# Patient Record
Sex: FEMALE | Race: WHITE | NOT HISPANIC OR LATINO | Employment: OTHER | URBAN - METROPOLITAN AREA
[De-identification: names, ages, dates, MRNs, and addresses within clinical notes are randomized per-mention and may not be internally consistent; named-entity substitution may affect disease eponyms.]

---

## 2023-10-09 ENCOUNTER — OFFICE VISIT (OUTPATIENT)
Dept: CARDIOLOGY | Facility: CLINIC | Age: 78
End: 2023-10-09
Payer: MEDICARE

## 2023-10-09 VITALS
SYSTOLIC BLOOD PRESSURE: 150 MMHG | HEIGHT: 62 IN | HEART RATE: 76 BPM | BODY MASS INDEX: 25.03 KG/M2 | WEIGHT: 136 LBS | DIASTOLIC BLOOD PRESSURE: 80 MMHG

## 2023-10-09 DIAGNOSIS — Z79.01 CHRONIC ANTICOAGULATION: ICD-10-CM

## 2023-10-09 DIAGNOSIS — I48.0 PAROXYSMAL ATRIAL FIBRILLATION: ICD-10-CM

## 2023-10-09 DIAGNOSIS — I25.118 CORONARY ARTERY DISEASE OF NATIVE ARTERY OF NATIVE HEART WITH STABLE ANGINA PECTORIS: ICD-10-CM

## 2023-10-09 DIAGNOSIS — R94.31 ABNORMAL EKG: Primary | ICD-10-CM

## 2023-10-09 RX ORDER — GLIPIZIDE 10 MG/1
TABLET, FILM COATED, EXTENDED RELEASE ORAL
COMMUNITY
Start: 2023-09-19

## 2023-10-09 RX ORDER — LOSARTAN POTASSIUM 100 MG/1
TABLET ORAL
COMMUNITY
Start: 2023-09-19

## 2023-10-09 RX ORDER — ALENDRONATE SODIUM 70 MG/1
TABLET ORAL
COMMUNITY
Start: 2023-08-11

## 2023-10-09 RX ORDER — DILTIAZEM HYDROCHLORIDE 120 MG/1
CAPSULE, COATED, EXTENDED RELEASE ORAL
COMMUNITY
Start: 2023-09-19

## 2023-10-09 RX ORDER — TRAMADOL HYDROCHLORIDE 50 MG/1
TABLET ORAL
COMMUNITY
Start: 2023-09-29

## 2023-10-09 RX ORDER — ATORVASTATIN CALCIUM 40 MG/1
TABLET, FILM COATED ORAL
COMMUNITY
Start: 2023-09-19

## 2023-10-09 RX ORDER — OMEPRAZOLE 20 MG/1
CAPSULE, DELAYED RELEASE ORAL
COMMUNITY
Start: 2023-09-19

## 2023-10-09 RX ORDER — PIOGLITAZONEHYDROCHLORIDE 30 MG/1
TABLET ORAL
COMMUNITY
Start: 2023-08-06

## 2023-10-09 NOTE — PROGRESS NOTES
NP SELF REFER CAD   Subjective:        [facility]  Cardiology Consult Note    Patient Identification:  Name: Michelle Atkinson  Age: 78 y.o.  Sex: female  :  1945  MRN: [id number]             CC  TWO STENTS, ,   DM  LUNG CA    AFIB  ANTICOAGULATED      History of Present Illness:     78-year-old female with known history of coronary artery disease with a previous stents, also had a lung cancer surgery was put on anticoagulation after the lung surgery for the atrial fibrillation wants to be off of the anticoagulation  Comorbid cardiac risk factors:     Past Medical History:  Past Medical History:   Diagnosis Date    CAD (coronary artery disease)     Essential (primary) hypertension     Type 2 diabetes mellitus      Past Surgical History:  Past Surgical History:   Procedure Laterality Date    CATARACT EXTRACTION Bilateral     CORONARY STENT PLACEMENT      x2    HYSTEROTOMY      PARTIAL      Allergies:  Allergies   Allergen Reactions    Codeine Nausea And Vomiting     Home Meds:  (Not in a hospital admission)    Current Meds:   No current facility-administered medications for this visit.  No current outpatient medications on file.    Facility-Administered Medications Ordered in Other Visits:     sodium chloride 0.9 % flush 10 mL, 10 mL, Intravenous, Q12H, Kath Smith MD    sodium chloride 0.9 % flush 10 mL, 10 mL, Intravenous, PRN, Kath Smith MD    sodium chloride 0.9 % infusion 40 mL, 40 mL, Intravenous, PRN, Kath Smith MD    sodium chloride 0.9 % infusion, 75 mL/hr, Intravenous, Continuous, Kath Smith MD, Last Rate: 75 mL/hr at 10/18/23 0914, 75 mL/hr at 10/18/23 09  Social History:   Social History     Tobacco Use    Smoking status: Former     Types: Cigarettes    Smokeless tobacco: Never   Substance Use Topics    Alcohol use: Never      Family History:  Family History   Problem Relation Age of Onset    Heart disease Mother     Heart  disease Father     Heart disease Brother         Review of Systems    Constitutional: No weakness,fatigue, fever, rigors, chills   Eyes: No vision changes, eye pain   ENT/oropharynx: No difficulty swallowing, sore throat, epistaxis, changes in hearing   Cardiovascular: No chest pain, chest tightness, palpitations, paroxysmal nocturnal dyspnea, orthopnea, diaphoresis, dizziness / syncopal episode   Respiratory: No shortness of breath, dyspnea on exertion, cough, wheezing hemoptysis   Gastrointestinal: No abdominal pain, nausea, vomiting, diarrhea, bloody stools   Genitourinary: No hematuria, dysuria   Neurological: No headache, tremors, numbness,  one-sided weakness    Musculoskeletal: No cramps, myalgias,  joint pain, joint swelling   Integument: No rash, edema           Constitutional:  Temp:  [98 °F (36.7 °C)] 98 °F (36.7 °C)  Heart Rate:  [73] 73  Resp:  [16] 16  BP: (158)/(63) 158/63    Physical Exam   General:  Appears in no acute distress  Eyes: PERTL,  HEENT:  No JVD. Thyroid not visibly enlarged. No mucosal pallor or cyanosis  Respiratory: Respirations regular and unlabored at rest. BBS with good air entry in all fields. No crackles, rubs or wheezes auscultated  Cardiovascular: S1S2 Regular rate and rhythm. No murmur, rub or gallop auscultated. No carotid bruits. DP/PT pulses    . No pretibial pitting edema  Gastrointestinal: Abdomen soft, flat, non tender. Bowel sounds present. No hepatosplenomegaly. No ascites  Musculoskeletal: CASTRO x4. No abnormal movements  Extremities: No digital clubbing or cyanosis  Skin: Color pink. Skin warm and dry to touch. No rashes  No xanthoma  Neuro: AAO x3 CN II-XII grossly intact            ECG 12 Lead    Date/Time: 10/9/2023 1:42 PM  Performed by: Kath Smith MD    Authorized by: Kath Smith MD  Comparison: not compared with previous ECG   Previous ECG: no previous ECG available  Rhythm: sinus rhythm  ST Flattening: all    Clinical impression:  non-specific ECG              Cardiographics  ECG:     Telemetry:    Echocardiogram:     Imaging  Chest X-ray:     Lab Review               [email or web address]              Assessment:/ Recommendations / Plan:   Patient Active Problem List   Diagnosis    Abnormal EKG    Coronary artery disease of native artery of native heart with stable angina pectoris    Chronic anticoagulation    Paroxysmal atrial fibrillation                    ICD-10-CM ICD-9-CM   1. Abnormal EKG  R94.31 794.31   2. Coronary artery disease of native artery of native heart with stable angina pectoris  I25.118 414.01     413.9   3. Chronic anticoagulation  Z79.01 V58.61   4. Paroxysmal atrial fibrillation  I48.0 427.31     1. Abnormal EKG  Considering the patient's symptoms as well as clinical situation and  EKG findings, along with cardiac risk factors, ischemic workup is necessary to rule out ischemic cardiomyopathy, stress induced arrhythmias, and functional capacity for diagnosis as well as prognostic consideration    - Stress Test With Myocardial Perfusion One Day  - Adult Transthoracic Echo Complete W/ Cont if Necessary Per Protocol  - Case Request Cath Lab: Loop insertion    2. Coronary artery disease of native artery of native heart with stable angina pectoris  Considering patient's medical condition as well as the risk factors, patient will require echocardiogram for further evaluation for the LV function, four-chamber evaluation, including the pressures, valvular function and  pericardial disease and pericardial effusion    - Stress Test With Myocardial Perfusion One Day  - Adult Transthoracic Echo Complete W/ Cont if Necessary Per Protocol  - Case Request Cath Lab: Loop insertion    3. Chronic anticoagulation  Continue current medication    4. Paroxysmal atrial fibrillation  No evidence of atrial fibrillation at this stage      WHITNEY, ECHO  LOOP AS PT WANTS TO COME OFF THE ELLOQUISE    AFIB WAS ONLY DURING LUNG SURG    Labs/tests ordered for  sb Smith MD  10/18/2023, 09:16 EDT      EMR Dragon/Transcription:   Dictated utilizing Dragon dictation

## 2023-10-09 NOTE — H&P (VIEW-ONLY)
NP SELF REFER CAD   Subjective:        [facility]  Cardiology Consult Note    Patient Identification:  Name: Michelle Atkinson  Age: 78 y.o.  Sex: female  :  1945  MRN: [id number]             CC  TWO STENTS, ,   DM  LUNG CA    AFIB  ANTICOAGULATED      History of Present Illness:     78-year-old female with known history of coronary artery disease with a previous stents, also had a lung cancer surgery was put on anticoagulation after the lung surgery for the atrial fibrillation wants to be off of the anticoagulation  Comorbid cardiac risk factors:     Past Medical History:  Past Medical History:   Diagnosis Date    CAD (coronary artery disease)     Essential (primary) hypertension     Type 2 diabetes mellitus      Past Surgical History:  Past Surgical History:   Procedure Laterality Date    CATARACT EXTRACTION Bilateral     CORONARY STENT PLACEMENT      x2    HYSTEROTOMY      PARTIAL      Allergies:  Allergies   Allergen Reactions    Codeine Nausea And Vomiting     Home Meds:  (Not in a hospital admission)    Current Meds:   No current facility-administered medications for this visit.  No current outpatient medications on file.    Facility-Administered Medications Ordered in Other Visits:     sodium chloride 0.9 % flush 10 mL, 10 mL, Intravenous, Q12H, Kath Smith MD    sodium chloride 0.9 % flush 10 mL, 10 mL, Intravenous, PRN, Kath Smith MD    sodium chloride 0.9 % infusion 40 mL, 40 mL, Intravenous, PRN, Kath Smith MD    sodium chloride 0.9 % infusion, 75 mL/hr, Intravenous, Continuous, Kath Smith MD, Last Rate: 75 mL/hr at 10/18/23 0914, 75 mL/hr at 10/18/23 09  Social History:   Social History     Tobacco Use    Smoking status: Former     Types: Cigarettes    Smokeless tobacco: Never   Substance Use Topics    Alcohol use: Never      Family History:  Family History   Problem Relation Age of Onset    Heart disease Mother     Heart  disease Father     Heart disease Brother         Review of Systems    Constitutional: No weakness,fatigue, fever, rigors, chills   Eyes: No vision changes, eye pain   ENT/oropharynx: No difficulty swallowing, sore throat, epistaxis, changes in hearing   Cardiovascular: No chest pain, chest tightness, palpitations, paroxysmal nocturnal dyspnea, orthopnea, diaphoresis, dizziness / syncopal episode   Respiratory: No shortness of breath, dyspnea on exertion, cough, wheezing hemoptysis   Gastrointestinal: No abdominal pain, nausea, vomiting, diarrhea, bloody stools   Genitourinary: No hematuria, dysuria   Neurological: No headache, tremors, numbness,  one-sided weakness    Musculoskeletal: No cramps, myalgias,  joint pain, joint swelling   Integument: No rash, edema           Constitutional:  Temp:  [98 °F (36.7 °C)] 98 °F (36.7 °C)  Heart Rate:  [73] 73  Resp:  [16] 16  BP: (158)/(63) 158/63    Physical Exam   General:  Appears in no acute distress  Eyes: PERTL,  HEENT:  No JVD. Thyroid not visibly enlarged. No mucosal pallor or cyanosis  Respiratory: Respirations regular and unlabored at rest. BBS with good air entry in all fields. No crackles, rubs or wheezes auscultated  Cardiovascular: S1S2 Regular rate and rhythm. No murmur, rub or gallop auscultated. No carotid bruits. DP/PT pulses    . No pretibial pitting edema  Gastrointestinal: Abdomen soft, flat, non tender. Bowel sounds present. No hepatosplenomegaly. No ascites  Musculoskeletal: CASTRO x4. No abnormal movements  Extremities: No digital clubbing or cyanosis  Skin: Color pink. Skin warm and dry to touch. No rashes  No xanthoma  Neuro: AAO x3 CN II-XII grossly intact            ECG 12 Lead    Date/Time: 10/9/2023 1:42 PM  Performed by: Kath Smith MD    Authorized by: Kath Smith MD  Comparison: not compared with previous ECG   Previous ECG: no previous ECG available  Rhythm: sinus rhythm  ST Flattening: all    Clinical impression:  non-specific ECG              Cardiographics  ECG:     Telemetry:    Echocardiogram:     Imaging  Chest X-ray:     Lab Review               [email or web address]              Assessment:/ Recommendations / Plan:   Patient Active Problem List   Diagnosis    Abnormal EKG    Coronary artery disease of native artery of native heart with stable angina pectoris    Chronic anticoagulation    Paroxysmal atrial fibrillation                    ICD-10-CM ICD-9-CM   1. Abnormal EKG  R94.31 794.31   2. Coronary artery disease of native artery of native heart with stable angina pectoris  I25.118 414.01     413.9   3. Chronic anticoagulation  Z79.01 V58.61   4. Paroxysmal atrial fibrillation  I48.0 427.31     1. Abnormal EKG  Considering the patient's symptoms as well as clinical situation and  EKG findings, along with cardiac risk factors, ischemic workup is necessary to rule out ischemic cardiomyopathy, stress induced arrhythmias, and functional capacity for diagnosis as well as prognostic consideration    - Stress Test With Myocardial Perfusion One Day  - Adult Transthoracic Echo Complete W/ Cont if Necessary Per Protocol  - Case Request Cath Lab: Loop insertion    2. Coronary artery disease of native artery of native heart with stable angina pectoris  Considering patient's medical condition as well as the risk factors, patient will require echocardiogram for further evaluation for the LV function, four-chamber evaluation, including the pressures, valvular function and  pericardial disease and pericardial effusion    - Stress Test With Myocardial Perfusion One Day  - Adult Transthoracic Echo Complete W/ Cont if Necessary Per Protocol  - Case Request Cath Lab: Loop insertion    3. Chronic anticoagulation  Continue current medication    4. Paroxysmal atrial fibrillation  No evidence of atrial fibrillation at this stage      WHITNEY, ECHO  LOOP AS PT WANTS TO COME OFF THE ELLOQUISE    AFIB WAS ONLY DURING LUNG SURG    Labs/tests ordered for  sb Smith MD  10/18/2023, 09:16 EDT      EMR Dragon/Transcription:   Dictated utilizing Dragon dictation

## 2023-10-10 ENCOUNTER — PATIENT ROUNDING (BHMG ONLY) (OUTPATIENT)
Dept: CARDIOLOGY | Facility: CLINIC | Age: 78
End: 2023-10-10
Payer: MEDICARE

## 2023-10-10 NOTE — PROGRESS NOTES
October 10, 2023    Tell me about your visit with us. What things went well?         We're always looking for ways to make our patients' experiences even better. Do you have recommendations on ways we may improve?      Overall were you satisfied with your first visit to our practice?        I appreciate you taking the time to speak with me today. Is there anything else I can do for you?       Thank you, and have a great day.

## 2023-10-18 ENCOUNTER — HOSPITAL ENCOUNTER (OUTPATIENT)
Facility: HOSPITAL | Age: 78
Setting detail: HOSPITAL OUTPATIENT SURGERY
Discharge: HOME | End: 2023-10-18
Attending: INTERNAL MEDICINE | Admitting: INTERNAL MEDICINE
Payer: MEDICARE

## 2023-10-18 VITALS
HEIGHT: 62 IN | HEART RATE: 78 BPM | TEMPERATURE: 98 F | DIASTOLIC BLOOD PRESSURE: 67 MMHG | WEIGHT: 136 LBS | BODY MASS INDEX: 25.03 KG/M2 | RESPIRATION RATE: 16 BRPM | SYSTOLIC BLOOD PRESSURE: 150 MMHG | OXYGEN SATURATION: 99 %

## 2023-10-18 DIAGNOSIS — R94.31 ABNORMAL EKG: ICD-10-CM

## 2023-10-18 DIAGNOSIS — I25.118 CORONARY ARTERY DISEASE OF NATIVE ARTERY OF NATIVE HEART WITH STABLE ANGINA PECTORIS: ICD-10-CM

## 2023-10-18 PROBLEM — I48.0 PAROXYSMAL ATRIAL FIBRILLATION: Status: ACTIVE | Noted: 2023-10-18

## 2023-10-18 PROBLEM — Z79.01 CHRONIC ANTICOAGULATION: Status: ACTIVE | Noted: 2023-10-18

## 2023-10-18 LAB — GLUCOSE BLDC GLUCOMTR-MCNC: 138 MG/DL (ref 70–130)

## 2023-10-18 PROCEDURE — 82948 REAGENT STRIP/BLOOD GLUCOSE: CPT

## 2023-10-18 PROCEDURE — 33285 INSJ SUBQ CAR RHYTHM MNTR: CPT | Performed by: INTERNAL MEDICINE

## 2023-10-18 PROCEDURE — C1764 EVENT RECORDER, CARDIAC: HCPCS | Performed by: INTERNAL MEDICINE

## 2023-10-18 PROCEDURE — 25010000002 CEFAZOLIN IN DEXTROSE 2-4 GM/100ML-% SOLUTION: Performed by: INTERNAL MEDICINE

## 2023-10-18 PROCEDURE — 25010000002 MIDAZOLAM PER 1 MG: Performed by: INTERNAL MEDICINE

## 2023-10-18 PROCEDURE — 25010000002 FENTANYL CITRATE (PF) 50 MCG/ML SOLUTION: Performed by: INTERNAL MEDICINE

## 2023-10-18 PROCEDURE — 25810000003 SODIUM CHLORIDE 0.9 % SOLUTION: Performed by: INTERNAL MEDICINE

## 2023-10-18 DEVICE — LUX-DX™ INSERTABLE CARDIAC MONITOR
Type: IMPLANTABLE DEVICE | Site: CHEST | Status: FUNCTIONAL
Brand: LUX-DX™ INSERTABLE CARDIAC MONITOR

## 2023-10-18 RX ORDER — SODIUM CHLORIDE 0.9 % (FLUSH) 0.9 %
10 SYRINGE (ML) INJECTION EVERY 12 HOURS SCHEDULED
Status: DISCONTINUED | OUTPATIENT
Start: 2023-10-18 | End: 2023-10-18 | Stop reason: HOSPADM

## 2023-10-18 RX ORDER — SODIUM CHLORIDE 9 MG/ML
75 INJECTION, SOLUTION INTRAVENOUS CONTINUOUS
Status: DISCONTINUED | OUTPATIENT
Start: 2023-10-18 | End: 2023-10-18 | Stop reason: HOSPADM

## 2023-10-18 RX ORDER — SODIUM CHLORIDE 9 MG/ML
40 INJECTION, SOLUTION INTRAVENOUS AS NEEDED
Status: DISCONTINUED | OUTPATIENT
Start: 2023-10-18 | End: 2023-10-18 | Stop reason: HOSPADM

## 2023-10-18 RX ORDER — FENTANYL CITRATE 50 UG/ML
INJECTION, SOLUTION INTRAMUSCULAR; INTRAVENOUS
Status: DISCONTINUED | OUTPATIENT
Start: 2023-10-18 | End: 2023-10-18 | Stop reason: HOSPADM

## 2023-10-18 RX ORDER — MIDAZOLAM HYDROCHLORIDE 1 MG/ML
INJECTION INTRAMUSCULAR; INTRAVENOUS
Status: DISCONTINUED | OUTPATIENT
Start: 2023-10-18 | End: 2023-10-18 | Stop reason: HOSPADM

## 2023-10-18 RX ORDER — SODIUM CHLORIDE 0.9 % (FLUSH) 0.9 %
10 SYRINGE (ML) INJECTION AS NEEDED
Status: DISCONTINUED | OUTPATIENT
Start: 2023-10-18 | End: 2023-10-18 | Stop reason: HOSPADM

## 2023-10-18 RX ORDER — CEFAZOLIN SODIUM 2 G/100ML
INJECTION, SOLUTION INTRAVENOUS
Status: COMPLETED | OUTPATIENT
Start: 2023-10-18 | End: 2023-10-18

## 2023-10-18 RX ADMIN — SODIUM CHLORIDE 75 ML/HR: 9 INJECTION, SOLUTION INTRAVENOUS at 09:14

## 2023-10-18 NOTE — DISCHARGE INSTRUCTIONS
"Dr Kath Smith  [address] [address]  [phone]      Instructions      1. The dressing may be removed in 48 hours.    2. If steri-strips were used, they should not be removed. Allow them to \"fall off\".      3. You may shower after the dressing is removed. Do not allow shower water to hit directly on incision.    4. No lotion/powder/ointment/cream on incision until it is healed.    5. Gently let water run down incision and pat dry.    6.  Call to schedule a follow-up appointment in the office in two weeks.    7. Please call the office if you experience any of the following:   bleeding or drainage from your incision   swelling, redness, or opening of your incision   fever or chills   pain not relieved with medication   chest pain or difficulty breathing   lightheadness  "

## 2023-10-18 NOTE — PERIOPERATIVE NURSING NOTE
Pt and spouse spoke to Dr Smith post procedure and loop recorder rep, received box and loop device information

## 2023-11-02 ENCOUNTER — HOSPITAL ENCOUNTER (OUTPATIENT)
Dept: CARDIOLOGY | Facility: HOSPITAL | Age: 78
Discharge: HOME | End: 2023-11-02
Payer: MEDICARE

## 2023-11-02 ENCOUNTER — OFFICE VISIT (OUTPATIENT)
Dept: CARDIOLOGY | Facility: CLINIC | Age: 78
End: 2023-11-02
Payer: MEDICARE

## 2023-11-02 VITALS — HEIGHT: 62 IN | BODY MASS INDEX: 25.03 KG/M2 | WEIGHT: 136 LBS

## 2023-11-02 VITALS — SYSTOLIC BLOOD PRESSURE: 165 MMHG | DIASTOLIC BLOOD PRESSURE: 84 MMHG | HEART RATE: 67 BPM

## 2023-11-02 VITALS
WEIGHT: 140 LBS | HEIGHT: 62 IN | SYSTOLIC BLOOD PRESSURE: 152 MMHG | DIASTOLIC BLOOD PRESSURE: 78 MMHG | HEART RATE: 69 BPM | BODY MASS INDEX: 25.76 KG/M2

## 2023-11-02 DIAGNOSIS — I48.0 PAROXYSMAL ATRIAL FIBRILLATION: ICD-10-CM

## 2023-11-02 DIAGNOSIS — R94.31 ABNORMAL EKG: Primary | ICD-10-CM

## 2023-11-02 DIAGNOSIS — Z79.01 CHRONIC ANTICOAGULATION: ICD-10-CM

## 2023-11-02 DIAGNOSIS — I25.118 CORONARY ARTERY DISEASE OF NATIVE ARTERY OF NATIVE HEART WITH STABLE ANGINA PECTORIS: ICD-10-CM

## 2023-11-02 LAB
AORTIC DIMENSIONLESS INDEX: 0.7 (DI)
BH CV ECHO MEAS - ACS: 2 CM
BH CV ECHO MEAS - AO MAX PG: 6 MMHG
BH CV ECHO MEAS - AO MEAN PG: 3 MMHG
BH CV ECHO MEAS - AO ROOT DIAM: 3.2 CM
BH CV ECHO MEAS - AO V2 MAX: 122 CM/SEC
BH CV ECHO MEAS - AO V2 VTI: 28.2 CM
BH CV ECHO MEAS - AVA(I,D): 2.8 CM2
BH CV ECHO MEAS - CONTRAST EF 4CH: 49 CM2
BH CV ECHO MEAS - EDV(CUBED): 157.5 ML
BH CV ECHO MEAS - EDV(MOD-SP2): 172 ML
BH CV ECHO MEAS - EDV(MOD-SP4): 141 ML
BH CV ECHO MEAS - EF(MOD-BP): 49.8 %
BH CV ECHO MEAS - EF(MOD-SP2): 53.5 %
BH CV ECHO MEAS - EF(MOD-SP4): 46.8 %
BH CV ECHO MEAS - ESV(CUBED): 85.2 ML
BH CV ECHO MEAS - ESV(MOD-SP2): 80 ML
BH CV ECHO MEAS - ESV(MOD-SP4): 75 ML
BH CV ECHO MEAS - FS: 18.5 %
BH CV ECHO MEAS - IVS/LVPW: 1.13 CM
BH CV ECHO MEAS - IVSD: 0.9 CM
BH CV ECHO MEAS - LAT PEAK E' VEL: 4.6 CM/SEC
BH CV ECHO MEAS - LV MASS(C)D: 167.4 GRAMS
BH CV ECHO MEAS - LV MAX PG: 3.5 MMHG
BH CV ECHO MEAS - LV MEAN PG: 1 MMHG
BH CV ECHO MEAS - LV V1 MAX: 93.1 CM/SEC
BH CV ECHO MEAS - LV V1 VTI: 20.8 CM
BH CV ECHO MEAS - LVIDD: 5.4 CM
BH CV ECHO MEAS - LVIDS: 4.4 CM
BH CV ECHO MEAS - LVOT AREA: 3.8 CM2
BH CV ECHO MEAS - LVOT DIAM: 2.2 CM
BH CV ECHO MEAS - LVPWD: 0.8 CM
BH CV ECHO MEAS - MED PEAK E' VEL: 5.3 CM/SEC
BH CV ECHO MEAS - MV A DUR: 0.17 SEC
BH CV ECHO MEAS - MV A MAX VEL: 85.9 CM/SEC
BH CV ECHO MEAS - MV DEC SLOPE: 304 CM/SEC2
BH CV ECHO MEAS - MV DEC TIME: 195 SEC
BH CV ECHO MEAS - MV E MAX VEL: 81 CM/SEC
BH CV ECHO MEAS - MV E/A: 0.94
BH CV ECHO MEAS - MV MAX PG: 3.3 MMHG
BH CV ECHO MEAS - MV MEAN PG: 2 MMHG
BH CV ECHO MEAS - MV P1/2T: 89.7 MSEC
BH CV ECHO MEAS - MV V2 VTI: 25.7 CM
BH CV ECHO MEAS - MVA(P1/2T): 2.45 CM2
BH CV ECHO MEAS - MVA(VTI): 3.1 CM2
BH CV ECHO MEAS - PA ACC TIME: 0.13 SEC
BH CV ECHO MEAS - PA V2 MAX: 72.1 CM/SEC
BH CV ECHO MEAS - PULM A REVS DUR: 0.17 SEC
BH CV ECHO MEAS - PULM A REVS VEL: 35.6 CM/SEC
BH CV ECHO MEAS - PULM DIAS VEL: 57.3 CM/SEC
BH CV ECHO MEAS - PULM S/D: 1.18
BH CV ECHO MEAS - PULM SYS VEL: 67.7 CM/SEC
BH CV ECHO MEAS - QP/QS: 0.58
BH CV ECHO MEAS - RAP SYSTOLE: 3 MMHG
BH CV ECHO MEAS - RV MAX PG: 2.41 MMHG
BH CV ECHO MEAS - RV V1 MAX: 77.6 CM/SEC
BH CV ECHO MEAS - RV V1 VTI: 18.1 CM
BH CV ECHO MEAS - RVOT DIAM: 1.8 CM
BH CV ECHO MEAS - RVSP: 17.6 MMHG
BH CV ECHO MEAS - SV(LVOT): 79.1 ML
BH CV ECHO MEAS - SV(MOD-SP2): 92 ML
BH CV ECHO MEAS - SV(MOD-SP4): 66 ML
BH CV ECHO MEAS - SV(RVOT): 46.1 ML
BH CV ECHO MEAS - TAPSE (>1.6): 2.17 CM
BH CV ECHO MEAS - TR MAX PG: 14.6 MMHG
BH CV ECHO MEAS - TR MAX VEL: 191 CM/SEC
BH CV ECHO MEASUREMENTS AVERAGE E/E' RATIO: 16.36
BH CV REST NUCLEAR ISOTOPE DOSE: 10.9 MCI
BH CV STRESS BP STAGE 1: NORMAL
BH CV STRESS COMMENTS STAGE 1: NORMAL
BH CV STRESS DOSE REGADENOSON STAGE 1: 0.4
BH CV STRESS DURATION MIN STAGE 1: 0
BH CV STRESS DURATION SEC STAGE 1: 10
BH CV STRESS HR STAGE 1: 88
BH CV STRESS NUCLEAR ISOTOPE DOSE: 32.1 MCI
BH CV STRESS PROTOCOL 1: NORMAL
BH CV STRESS RECOVERY BP: NORMAL MMHG
BH CV STRESS RECOVERY HR: 96 BPM
BH CV STRESS STAGE 1: 1
BH CV VAS BP RIGHT ARM: NORMAL MMHG
BH CV XLRA - RV BASE: 3 CM
BH CV XLRA - RV LENGTH: 5.6 CM
BH CV XLRA - RV MID: 2.37 CM
BH CV XLRA - TDI S': 15.2 CM/SEC
LEFT ATRIUM VOLUME INDEX: 67.3 ML/M2
LV EF NUC BP: 60 %
MAXIMAL PREDICTED HEART RATE: 141 BPM
PERCENT MAX PREDICTED HR: 62.41 %
SINUS: 3.6 CM
STJ: 3.4 CM
STRESS BASELINE BP: NORMAL MMHG
STRESS BASELINE HR: 69 BPM
STRESS PERCENT HR: 73 %
STRESS POST ESTIMATED WORKLOAD: 1 METS
STRESS POST EXERCISE DUR MIN: 1 MIN
STRESS POST EXERCISE DUR SEC: 0 SEC
STRESS POST PEAK BP: NORMAL MMHG
STRESS POST PEAK HR: 88 BPM
STRESS TARGET HR: 120 BPM

## 2023-11-02 PROCEDURE — 25010000002 REGADENOSON 0.4 MG/5ML SOLUTION: Performed by: INTERNAL MEDICINE

## 2023-11-02 PROCEDURE — A9500 TC99M SESTAMIBI: HCPCS | Performed by: INTERNAL MEDICINE

## 2023-11-02 PROCEDURE — 25510000001 PERFLUTREN (DEFINITY) 8.476 MG IN SODIUM CHLORIDE (PF) 0.9 % 10 ML INJECTION: Performed by: INTERNAL MEDICINE

## 2023-11-02 PROCEDURE — 93306 TTE W/DOPPLER COMPLETE: CPT | Performed by: INTERNAL MEDICINE

## 2023-11-02 PROCEDURE — 93017 CV STRESS TEST TRACING ONLY: CPT

## 2023-11-02 PROCEDURE — 0 TECHNETIUM SESTAMIBI: Performed by: INTERNAL MEDICINE

## 2023-11-02 PROCEDURE — 93306 TTE W/DOPPLER COMPLETE: CPT

## 2023-11-02 PROCEDURE — 78452 HT MUSCLE IMAGE SPECT MULT: CPT

## 2023-11-02 RX ORDER — REGADENOSON 0.08 MG/ML
0.4 INJECTION, SOLUTION INTRAVENOUS
Status: COMPLETED | OUTPATIENT
Start: 2023-11-02 | End: 2023-11-02

## 2023-11-02 RX ADMIN — SODIUM CHLORIDE 1.5 ML: 9 INJECTION INTRAMUSCULAR; INTRAVENOUS; SUBCUTANEOUS at 10:53

## 2023-11-02 RX ADMIN — REGADENOSON 0.4 MG: 0.08 INJECTION, SOLUTION INTRAVENOUS at 10:00

## 2023-11-02 RX ADMIN — TECHNETIUM TC 99M SESTAMIBI 1 DOSE: 1 INJECTION INTRAVENOUS at 10:00

## 2023-11-02 RX ADMIN — TECHNETIUM TC 99M SESTAMIBI 1 DOSE: 1 INJECTION INTRAVENOUS at 08:04

## 2023-11-02 NOTE — PROGRESS NOTES
ECHO, STRESS TEST TODAY.    FOLLOW UP   Subjective:        Michelle Atkinson is a 78 y.o. female who here for follow up    CC  Follow-up coronary artery disease atrial fibrillation chronic anticoagulation  HPI  78-year-old female with coronary artery disease paroxysmal atrial fibrillation chronic anticoagulation underwent the stress test and echo     Problems Addressed this Visit          Cardiac and Vasculature    Abnormal EKG - Primary    Coronary artery disease of native artery of native heart with stable angina pectoris    Paroxysmal atrial fibrillation       Coag and Thromboembolic    Chronic anticoagulation     Diagnoses         Codes Comments    Abnormal EKG    -  Primary ICD-10-CM: R94.31  ICD-9-CM: 794.31     Coronary artery disease of native artery of native heart with stable angina pectoris     ICD-10-CM: I25.118  ICD-9-CM: 414.01, 413.9     Chronic anticoagulation     ICD-10-CM: Z79.01  ICD-9-CM: V58.61     Paroxysmal atrial fibrillation     ICD-10-CM: I48.0  ICD-9-CM: 427.31           .    The following portions of the patient's history were reviewed and updated as appropriate: allergies, current medications, past family history, past medical history, past social history, past surgical history and problem list.    Past Medical History:   Diagnosis Date    CAD (coronary artery disease)     Essential (primary) hypertension     Type 2 diabetes mellitus      reports that she has quit smoking. Her smoking use included cigarettes. She has never used smokeless tobacco. She reports that she does not drink alcohol and does not use drugs.   Family History   Problem Relation Age of Onset    Heart disease Mother     Heart disease Father     Heart disease Brother        Review of Systems  Constitutional: No wt loss, fever, fatigue  Gastrointestinal: No nausea, abdominal pain  Behavioral/Psych: No insomnia or anxiety   Cardiovascular no chest pains or tightness in the chest  Objective:       Physical Exam            "Physical Exam  /78   Pulse 69   Ht 157.5 cm (62\")   Wt 63.5 kg (140 lb)   BMI 25.61 kg/m²     General appearance: No acute changes   Eyes: Sclerae conjunctivae normal, pupils reactive   HENT: Atraumatic; oropharynx clear with moist mucous membranes and no mucosal ulcerations;  Neck: Trachea midline; NECK, supple, no thyromegaly or lymphadenopathy   Lungs: Normal size and shape, normal breath sounds, equal distribution of air, no rales and rhonchi   CV: S1-S2 regular, no murmurs, no rub, no gallop   Abdomen: Soft, nontender; no masses , no abnormal abdominal sounds   Extremities: No deformity , normal color , no peripheral edema   Skin: Normal temperature, turgor and texture; no rash, ulcers  Psych: Appropriate affect, alert and oriented to person, place and time           Procedures      Echocardiogram:        Current Outpatient Medications:     alendronate (FOSAMAX) 70 MG tablet, , Disp: , Rfl:     atorvastatin (LIPITOR) 40 MG tablet, , Disp: , Rfl:     dilTIAZem CD (CARDIZEM CD) 120 MG 24 hr capsule, , Disp: , Rfl:     glipizide (GLUCOTROL XL) 10 MG 24 hr tablet, , Disp: , Rfl:     losartan (COZAAR) 100 MG tablet, , Disp: , Rfl:     metFORMIN (GLUCOPHAGE) 1000 MG tablet, , Disp: , Rfl:     Mirabegron (MYRBETRIQ PO), Take  by mouth., Disp: , Rfl:     omeprazole (priLOSEC) 20 MG capsule, , Disp: , Rfl:     pioglitazone (ACTOS) 30 MG tablet, , Disp: , Rfl:     thiamine (VITAMIN B-1) 100 MG tablet  tablet, Take 1 tablet by mouth Daily., Disp: , Rfl:     traMADol (ULTRAM) 50 MG tablet, , Disp: , Rfl:    Assessment:        [unfilled]            Plan:            ICD-10-CM ICD-9-CM   1. Abnormal EKG  R94.31 794.31   2. Coronary artery disease of native artery of native heart with stable angina pectoris  I25.118 414.01     413.9   3. Chronic anticoagulation  Z79.01 V58.61   4. Paroxysmal atrial fibrillation  I48.0 427.31     1. Abnormal EKG  No angina pectoris    2. Coronary artery disease of native artery of " native heart with stable angina pectoris  No angina pectoris    3. Chronic anticoagulation  Continue current treatment    4. Paroxysmal atrial fibrillation  Under control      6 months    Specificity and sensitivity of the stress test/ cardiac workup has been explained. Pt has been explained if  Symptoms continue please go to ER, and further w/p will be required.    Also explained this does not rule out coronary artery disease or the future events, continue to emphasize on risk reductions for coronary artery disease    Pt also advised to contact PCP for other causes of symptoms    COUNSELING:    Michelle Coffey was given to patient for the following topics: diagnostic results, risk factor reductions, impressions, risks and benefits of treatment options and importance of treatment compliance .       SMOKING COUNSELING:        Dictated using Dragon dictation

## 2023-11-06 ENCOUNTER — TELEPHONE (OUTPATIENT)
Dept: CARDIOLOGY | Facility: CLINIC | Age: 78
End: 2023-11-06

## 2023-11-06 NOTE — TELEPHONE ENCOUNTER
Caller: Michelle Atkinson    Relationship: Self    Best call back number: [phone]    What is the best time to reach you: ANYTIME    Who are you requesting to speak with (clinical staff, provider,  specific staff member): CLINICAL        What was the call regarding: PT HAD A STROKE  ON 11/04/2023 AND IS IN [facility].  THEY WANT TO PUT HER BACK ON ELIQUIS, WHAT IS YOUR OPINION? PLEASE ADVISE PT    Is it okay if the provider responds through MyChart: NO

## 2023-11-07 ENCOUNTER — TELEPHONE (OUTPATIENT)
Dept: CARDIOLOGY | Facility: CLINIC | Age: 78
End: 2023-11-07
Payer: MEDICARE

## 2023-11-07 NOTE — TELEPHONE ENCOUNTER
PATIENT STATES THAT DR WALKER TOOK HER OFF ELIQUIST RECENTLY.  PATIENT ALSO STATES THAT SHE HAD A STROKE OVER THE WEEKEND AND WAS SEEN AT A HOSPITAL IN [place].  PATIENT SAYS THAT THE DR WHO SAW HER IN [place] WANTS TO PUT HER ON ELIQUIS.  PLEASE CALL PATIENT TO ADVISE.  THANKS   [phone]

## 2023-11-10 ENCOUNTER — TELEPHONE (OUTPATIENT)
Dept: CARDIOLOGY | Facility: CLINIC | Age: 78
End: 2023-11-10

## 2023-11-10 NOTE — TELEPHONE ENCOUNTER
Caller Name: Michelle Atkinson      Relationship: Self      Best Contact Number: [phone]      Patient is requesting samples of ELIQUIS 5MG TWICE A DAY      How many days of medication do you have left? PATIENT ONLY HAS ABOUT 10 DAYS OF MEDS LEFT FOR THE YEAR      Additional Information: SHE GETS IT THROUGH [employer] AND HAS ENOUGH LEFT FOR ABOUT 10 DAYS

## 2023-11-28 ENCOUNTER — HOSPITAL ENCOUNTER (OUTPATIENT)
Dept: CARDIOLOGY | Facility: HOSPITAL | Age: 78
Discharge: HOME | End: 2023-11-28
Payer: MEDICARE

## 2023-11-28 ENCOUNTER — OFFICE VISIT (OUTPATIENT)
Dept: CARDIOLOGY | Facility: CLINIC | Age: 78
End: 2023-11-28
Payer: MEDICARE

## 2023-11-28 VITALS
SYSTOLIC BLOOD PRESSURE: 141 MMHG | BODY MASS INDEX: 25.21 KG/M2 | WEIGHT: 137 LBS | DIASTOLIC BLOOD PRESSURE: 75 MMHG | HEIGHT: 62 IN | HEART RATE: 77 BPM

## 2023-11-28 DIAGNOSIS — R60.0 BILATERAL LEG EDEMA: ICD-10-CM

## 2023-11-28 DIAGNOSIS — E78.5 HYPERLIPIDEMIA, UNSPECIFIED HYPERLIPIDEMIA TYPE: ICD-10-CM

## 2023-11-28 DIAGNOSIS — I25.118 CORONARY ARTERY DISEASE OF NATIVE ARTERY OF NATIVE HEART WITH STABLE ANGINA PECTORIS: Primary | ICD-10-CM

## 2023-11-28 DIAGNOSIS — I10 PRIMARY HYPERTENSION: ICD-10-CM

## 2023-11-28 DIAGNOSIS — I48.0 PAROXYSMAL ATRIAL FIBRILLATION: ICD-10-CM

## 2023-11-28 DIAGNOSIS — I25.118 CORONARY ARTERY DISEASE OF NATIVE ARTERY OF NATIVE HEART WITH STABLE ANGINA PECTORIS: ICD-10-CM

## 2023-11-28 DIAGNOSIS — Z95.818 STATUS POST PLACEMENT OF IMPLANTABLE LOOP RECORDER: ICD-10-CM

## 2023-11-28 PROBLEM — I63.9 CVA (CEREBROVASCULAR ACCIDENT): Status: ACTIVE | Noted: 2023-11-28

## 2023-11-28 LAB
ANION GAP SERPL CALCULATED.3IONS-SCNC: 8.7 MMOL/L (ref 5–15)
BUN SERPL-MCNC: 32 MG/DL (ref 8–23)
BUN/CREAT SERPL: 26.2 (ref 7–25)
CALCIUM SPEC-SCNC: 9.9 MG/DL (ref 8.6–10.5)
CHLORIDE SERPL-SCNC: 110 MMOL/L (ref 98–107)
CO2 SERPL-SCNC: 22.3 MMOL/L (ref 22–29)
CREAT SERPL-MCNC: 1.22 MG/DL (ref 0.57–1)
EGFRCR SERPLBLD CKD-EPI 2021: 45.2 ML/MIN/1.73
GLUCOSE SERPL-MCNC: 180 MG/DL (ref 65–99)
POTASSIUM SERPL-SCNC: 5.3 MMOL/L (ref 3.5–5.2)
SODIUM SERPL-SCNC: 141 MMOL/L (ref 136–145)

## 2023-11-28 PROCEDURE — 36415 COLL VENOUS BLD VENIPUNCTURE: CPT

## 2023-11-28 PROCEDURE — 80048 BASIC METABOLIC PNL TOTAL CA: CPT

## 2023-11-28 RX ORDER — HYDROCHLOROTHIAZIDE 25 MG/1
12.5 TABLET ORAL
COMMUNITY
Start: 2023-11-06 | End: 2023-11-28 | Stop reason: ALTCHOICE

## 2023-11-28 RX ORDER — FUROSEMIDE 20 MG/1
20 TABLET ORAL DAILY
Qty: 30 TABLET | Refills: 11 | Status: SHIPPED | OUTPATIENT
Start: 2023-11-28

## 2023-11-28 RX ORDER — BISOPROLOL FUMARATE AND HYDROCHLOROTHIAZIDE 5; 6.25 MG/1; MG/1
1 TABLET ORAL DAILY
Qty: 30 TABLET | Refills: 11 | Status: SHIPPED | OUTPATIENT
Start: 2023-11-28

## 2023-11-28 NOTE — PROGRESS NOTES
Subjective:        Michelle Atkinson is a 78 y.o. female who here for follow up    Chief Complaint   Patient presents with    Hospital Follow Up Visit     stroke       HPI    This is a 78-year-old female who is new with this provider with paroxysmal atrial fibrillation, coronary artery disease with history of stent placed in 2002 and 2007, hypertension, diabetes mellitus.  She follows up in office today after hospitalization in [place] in which she was diagnosed with CVA.    Looks like she was seen 10/9/2023 to establish care requesting to come off anticoagulation.  Reported she had 2 episodes of A-fib during chemotherapy for lung cancer.  She had loop placed 10/19/2023 and Eliquis was stopped at that time.    Echo 11/2/2023 EF 46 to 50%, normal LV function.  LAC mildly dilated, L a volume mildly increased.  RA C mildly dilated.  Stress test 11/2/2023 myocardial perfusion imaging indicates a normal study with no evidence of ischemia.    The following portions of the patient's history were reviewed and updated as appropriate: allergies, current medications, past family history, past medical history, past social history, past surgical history and problem list.    Past Medical History:   Diagnosis Date    CAD (coronary artery disease)     Essential (primary) hypertension     Stroke     Type 2 diabetes mellitus          reports that she has quit smoking. Her smoking use included cigarettes. She has never used smokeless tobacco. She reports that she does not drink alcohol and does not use drugs.     Family History   Problem Relation Age of Onset    Heart disease Mother     Heart disease Father     Heart disease Brother        ROS     Review of Systems  Constitutional: No wt loss, fever, fatigue  Gastrointestinal: No nausea, abdominal pain  Behavioral/Psych: No insomnia or anxiety  Cardiovascular no chest pain or shortness of breath      Objective:           Vitals and nursing note reviewed.   Constitutional:        Appearance: Well-developed.   HENT:      Head: Normocephalic.      Right Ear: External ear normal.      Left Ear: External ear normal.   Neck:      Vascular: No JVD.   Pulmonary:      Effort: Pulmonary effort is normal. No respiratory distress.      Breath sounds: Normal breath sounds. No stridor. No rales.   Cardiovascular:      Normal rate. Regular rhythm.      No gallop.    Pulses:     Intact distal pulses.   Edema:     Peripheral edema absent.   Abdominal:      General: Bowel sounds are normal. There is no distension.      Palpations: Abdomen is soft.      Tenderness: There is no abdominal tenderness. There is no guarding.   Musculoskeletal: Normal range of motion.         General: No tenderness.      Cervical back: Normal range of motion. Skin:     General: Skin is warm.   Neurological:      Mental Status: Alert and oriented to person, place, and time.      Deep Tendon Reflexes: Reflexes are normal and symmetric.   Psychiatric:         Judgment: Judgment normal.         Procedures    Interpretation Summary         Left ventricular ejection fraction is normal (Calculated EF = 60%).    Myocardial perfusion imaging indicates a normal myocardial perfusion study with no evidence of ischemia.    Impressions are consistent with a low risk study.    Findings consistent with a normal ECG stress test.    Interpretation Summary         Left ventricular ejection fraction appears to be 46 - 50%.    Left ventricular diastolic function was normal.    The left atrial cavity is mildly dilated.    Left atrial volume is mildly increased.    The right atrial cavity is mildly  dilated.    Estimated right ventricular systolic pressure from tricuspid regurgitation is normal (<35 mmHg).      Current Outpatient Medications:     alendronate (FOSAMAX) 70 MG tablet, , Disp: , Rfl:     apixaban (ELIQUIS) 5 MG tablet tablet, Take 1 tablet by mouth Every 12 (Twelve) Hours., Disp: 60 tablet, Rfl: 11    atorvastatin (LIPITOR) 40 MG tablet, ,  Disp: , Rfl:     glipizide (GLUCOTROL XL) 10 MG 24 hr tablet, , Disp: , Rfl:     losartan (COZAAR) 100 MG tablet, , Disp: , Rfl:     metFORMIN (GLUCOPHAGE) 1000 MG tablet, , Disp: , Rfl:     Mirabegron (MYRBETRIQ PO), Take  by mouth., Disp: , Rfl:     omeprazole (priLOSEC) 20 MG capsule, , Disp: , Rfl:     pioglitazone (ACTOS) 30 MG tablet, , Disp: , Rfl:     thiamine (VITAMIN B-1) 100 MG tablet  tablet, Take 1 tablet by mouth Daily., Disp: , Rfl:     traMADol (ULTRAM) 50 MG tablet, , Disp: , Rfl:     bisoprolol-hydrochlorothiazide (ZIAC) 5-6.25 MG per tablet, Take 1 tablet by mouth Daily., Disp: 30 tablet, Rfl: 11    furosemide (LASIX) 20 MG tablet, Take 1 tablet by mouth Daily., Disp: 30 tablet, Rfl: 11     Assessment:        Patient Active Problem List   Diagnosis    Abnormal EKG    Coronary artery disease of native artery of native heart with stable angina pectoris    Chronic anticoagulation    Paroxysmal atrial fibrillation    CVA (cerebrovascular accident)     CHADS-VASc Risk Assessment              5 Total Score    1 Hypertension    2 Age >/= 75    1 DM    1 Sex: Female        Criteria that do not apply:    CHF    PRIOR STROKE/TIA/THROMBO    Vascular Disease    Age 65-74                    Plan:   1.  Paroxysmal atrial fibrillation: Loop interrogation from 11/20/2023 reveals no episodes or events.  Recent CVA. Now on AC with eliquis, refilled. She does have some leg swelling, I will stop diltiazem and start bisoprolol 5/6.25    Pros and cons as well as indication of the anticoagulation has been explained to the patient in detail. There are no obvious complications at this stage. Risk of  the bleedings has been explained. Need for the regular blood workup and adjust the dose has been explained. Need for proper follow-up on anticoagulation also has been explained.     2.  Loop recorder: Monitored, device interrogation 11/20/2023 without episodes or events.    3.  Coronary artery disease: Ischemic workup as  above.  No chest pain.     Risk reduction for the coronary artery disease, controlling the blood pressure, blood sugar management, cholesterol management, exercise, stress management, and proper compliance with medications and follow-up has been discussed    4.  Hypertension: BP today in office 141/75,  Controlled.  Continue losartan . Adjustments made stop diltiazem, start ziac 5/6.25    Importance of controlling hypertension and blood pressure  checkup on the regular basis has been explained. Hypertension as a silent killer has been discussed. Risk reduction of the weight and regular exercises to control the hypertension has been explained.    5.  Hyperlipidemia: She reports her PCP manages her cholesterol labs.  Continue atorvastatin.    Risk of the hyperlipidemia, importance of the treatment has been explained. Pros and cons of the statins has been explained. Regular blood workup as well as side effects including the liver failure, myelopathy death has been explained.    6. LE edema: BLE pitting edema, stop HCTZ, start furosemide 20. BMP today.     Pros and cons of this new medication / change medication has been explained to  the patient. Possible side effects has been explained. Associated need of the blood  Work has been explained. Need for the compliance of the medication has been explained.                 No diagnosis found.    There are no diagnoses linked to this encounter.    COUNSELING: Roosevelt Coffey was given to patient for the following topics: diagnostic results, risk factor reductions, impressions, risks and benefits of treatment options and importance of treatment compliance .       SMOKING COUNSELING: denies  Stop diltiazem, start ziac 5/6.25  Stop HCTZ, start furosemide 20mg  BMP today  Follow up in 1 month or sooner if needed  Call in 2 days if swelling not improved or new or worsening symptoms    Sincerely,   STARR Mott  [facility]  [facility]  11/28/23  13:05 EST    EMR Dragon/Transcription disclaimer:   Much of this encounter note is an electronic transcription/translation of spoken language to printed text. The electronic translation of spoken language may permit erroneous, or at times, nonsensical words or phrases to be inadvertently transcribed; Although I have reviewed the note for such errors, some may still exist.

## 2024-01-12 RX ORDER — DAPAGLIFLOZIN 10 MG/1
TABLET, FILM COATED ORAL
COMMUNITY
Start: 2024-01-08

## 2024-01-12 RX ORDER — AMLODIPINE BESYLATE 5 MG/1
1 TABLET ORAL DAILY
COMMUNITY
Start: 2024-01-01

## 2024-01-22 ENCOUNTER — TELEPHONE (OUTPATIENT)
Dept: CARDIOLOGY | Facility: CLINIC | Age: 79
End: 2024-01-22

## 2024-01-22 NOTE — TELEPHONE ENCOUNTER
Caller: Michelle Atkinson    Relationship to patient: Self    Best call back number: [phone]    Chief complaint: 3 OR 4 TIMES SHE'S HAD SPELLS WHERE IT IS HARD TO BREATH,  PRESSURE IN CHEST,  SICK AT STOMACH,  AND HEART RACING. GETS REALLY WEAK WHEN IT HAPPENS AND BREAKS OUT IN A SWEAT.      Type of visit: FOLLOW UP    Requested date: ASAP     If rescheduling, when is the original appointment: HAS AN APPT 5-20-24     Additional notes: SHE HAS A LOOP DEVICE.   PATIENT HAD A STROKE ON 11-4-23.  ABOUT 2 WEEKS AGO SHE WAS IN THE HOSPITAL FOR SEVERE DEHYDRATION.   WENT TO THE DOCTOR THIS MORNING AND HER KIDNEY NUMBERS ARE NOT GOOD.       SHE ALSO NEEDS NEW FORMS FOR [employer].  THE COMPANY HAS NEWER FORMS THAT HAVE TO BE FILLED OUT BY THE OFFICE.

## 2024-01-23 ENCOUNTER — HOSPITAL ENCOUNTER (OUTPATIENT)
Dept: CARDIOLOGY | Facility: HOSPITAL | Age: 79
Discharge: HOME | End: 2024-01-23
Payer: MEDICARE

## 2024-01-23 ENCOUNTER — OFFICE VISIT (OUTPATIENT)
Dept: CARDIOLOGY | Facility: CLINIC | Age: 79
End: 2024-01-23
Payer: MEDICARE

## 2024-01-23 VITALS
HEIGHT: 62 IN | SYSTOLIC BLOOD PRESSURE: 114 MMHG | BODY MASS INDEX: 22.63 KG/M2 | HEART RATE: 61 BPM | DIASTOLIC BLOOD PRESSURE: 69 MMHG | WEIGHT: 123 LBS

## 2024-01-23 DIAGNOSIS — E78.5 HYPERLIPIDEMIA, UNSPECIFIED HYPERLIPIDEMIA TYPE: ICD-10-CM

## 2024-01-23 DIAGNOSIS — R07.2 PRECORDIAL PAIN: Primary | ICD-10-CM

## 2024-01-23 DIAGNOSIS — I25.118 CORONARY ARTERY DISEASE OF NATIVE ARTERY OF NATIVE HEART WITH STABLE ANGINA PECTORIS: ICD-10-CM

## 2024-01-23 DIAGNOSIS — I10 PRIMARY HYPERTENSION: ICD-10-CM

## 2024-01-23 DIAGNOSIS — Z95.818 STATUS POST PLACEMENT OF IMPLANTABLE LOOP RECORDER: ICD-10-CM

## 2024-01-23 DIAGNOSIS — I48.0 PAROXYSMAL ATRIAL FIBRILLATION: ICD-10-CM

## 2024-01-23 DIAGNOSIS — R06.02 SOB (SHORTNESS OF BREATH): ICD-10-CM

## 2024-01-23 LAB
ANION GAP SERPL CALCULATED.3IONS-SCNC: 18 MMOL/L (ref 5–15)
APTT PPP: 33.7 SECONDS (ref 22.7–35.4)
BASOPHILS # BLD AUTO: 0.04 10*3/MM3 (ref 0–0.2)
BASOPHILS NFR BLD AUTO: 0.6 % (ref 0–1.5)
BUN SERPL-MCNC: 56 MG/DL (ref 8–23)
BUN/CREAT SERPL: 27.3 (ref 7–25)
CALCIUM SPEC-SCNC: 9.9 MG/DL (ref 8.6–10.5)
CHLORIDE SERPL-SCNC: 99 MMOL/L (ref 98–107)
CO2 SERPL-SCNC: 21 MMOL/L (ref 22–29)
CREAT SERPL-MCNC: 2.05 MG/DL (ref 0.57–1)
DEPRECATED RDW RBC AUTO: 43.3 FL (ref 37–54)
EGFRCR SERPLBLD CKD-EPI 2021: 24.3 ML/MIN/1.73
EOSINOPHIL # BLD AUTO: 0.11 10*3/MM3 (ref 0–0.4)
EOSINOPHIL NFR BLD AUTO: 1.7 % (ref 0.3–6.2)
ERYTHROCYTE [DISTWIDTH] IN BLOOD BY AUTOMATED COUNT: 13.1 % (ref 12.3–15.4)
GLUCOSE SERPL-MCNC: 193 MG/DL (ref 65–99)
HCT VFR BLD AUTO: 35.6 % (ref 34–46.6)
HGB BLD-MCNC: 11.3 G/DL (ref 12–15.9)
IMM GRANULOCYTES # BLD AUTO: 0.02 10*3/MM3 (ref 0–0.05)
IMM GRANULOCYTES NFR BLD AUTO: 0.3 % (ref 0–0.5)
INR PPP: 1.35 (ref 0.9–1.1)
LYMPHOCYTES # BLD AUTO: 1.56 10*3/MM3 (ref 0.7–3.1)
LYMPHOCYTES NFR BLD AUTO: 24 % (ref 19.6–45.3)
MCH RBC QN AUTO: 29.5 PG (ref 26.6–33)
MCHC RBC AUTO-ENTMCNC: 31.7 G/DL (ref 31.5–35.7)
MCV RBC AUTO: 93 FL (ref 79–97)
MONOCYTES # BLD AUTO: 0.53 10*3/MM3 (ref 0.1–0.9)
MONOCYTES NFR BLD AUTO: 8.1 % (ref 5–12)
NEUTROPHILS NFR BLD AUTO: 4.25 10*3/MM3 (ref 1.7–7)
NEUTROPHILS NFR BLD AUTO: 65.3 % (ref 42.7–76)
NRBC BLD AUTO-RTO: 0 /100 WBC (ref 0–0.2)
PLATELET # BLD AUTO: 288 10*3/MM3 (ref 140–450)
PMV BLD AUTO: 11.9 FL (ref 6–12)
POTASSIUM SERPL-SCNC: 3.3 MMOL/L (ref 3.5–5.2)
PROTHROMBIN TIME: 16.9 SECONDS (ref 11.7–14.2)
RBC # BLD AUTO: 3.83 10*6/MM3 (ref 3.77–5.28)
SODIUM SERPL-SCNC: 138 MMOL/L (ref 136–145)
WBC NRBC COR # BLD AUTO: 6.51 10*3/MM3 (ref 3.4–10.8)

## 2024-01-23 PROCEDURE — 85610 PROTHROMBIN TIME: CPT

## 2024-01-23 PROCEDURE — 36415 COLL VENOUS BLD VENIPUNCTURE: CPT

## 2024-01-23 PROCEDURE — 3078F DIAST BP <80 MM HG: CPT

## 2024-01-23 PROCEDURE — 80048 BASIC METABOLIC PNL TOTAL CA: CPT

## 2024-01-23 PROCEDURE — 99214 OFFICE O/P EST MOD 30 MIN: CPT

## 2024-01-23 PROCEDURE — 85025 COMPLETE CBC W/AUTO DIFF WBC: CPT

## 2024-01-23 PROCEDURE — 3074F SYST BP LT 130 MM HG: CPT

## 2024-01-23 PROCEDURE — 85730 THROMBOPLASTIN TIME PARTIAL: CPT

## 2024-01-23 PROCEDURE — 93000 ELECTROCARDIOGRAM COMPLETE: CPT

## 2024-01-23 NOTE — PROGRESS NOTES
Subjective:        Michelle Atkinson is a 78 y.o. female who here for follow up    Chief Complaint   Patient presents with    Follow-up     SOB        HPI      This is a 78-year-old female with paroxysmal atrial fibrillation, coronary artery disease with history of stent placed in 2002 in 2007, hypertension, diabetes mellitus, history of CVA.    She follows up in office today with complaints of shortness of breath. Started out it was at night, now mostly in the morning when she first wakes up then doesn't notice any more shortness of breath throughout the day. Sometimes feels short of breath during the day but not as bad as in the morning.     Seen 11/28/2023: Diltiazem stopped and started on Ziac 5/6.25, HCTZ stopped and started on furosemide 20 mg daily    Record reviewed and still relevant: Looks like she was seen 10/9/2023 to establish care requesting to come off anticoagulation.  Reported she had 2 episodes of A-fib during chemotherapy for lung cancer.  She had loop placed 10/19/2023 and Eliquis was stopped at that time.    Reviewed and still relevant: Echo 11/2/2023 EF 46 to 50%, normal LV function.  LAC mildly dilated, L a volume mildly increased.  RA C mildly dilated.  Stress test 11/2/2023 myocardial perfusion imaging indicates a normal study with no evidence of ischemia.    The following portions of the patient's history were reviewed and updated as appropriate: allergies, current medications, past family history, past medical history, past social history, past surgical history and problem list.    Past Medical History:   Diagnosis Date    CAD (coronary artery disease)     Essential (primary) hypertension     Stroke     Type 2 diabetes mellitus          reports that she has quit smoking. Her smoking use included cigarettes. She has never used smokeless tobacco. She reports that she does not drink alcohol and does not use drugs.     Family History   Problem Relation Age of Onset    Heart disease Mother      Heart disease Father     Heart disease Brother        ROS     Review of Systems  Constitutional: No wt loss, fever, fatigue  Gastrointestinal: No nausea, abdominal pain  Behavioral/Psych: No insomnia or anxiety  Cardiovascular no chest pain or shortness of breath      Objective:           Vitals and nursing note reviewed.   Constitutional:       Appearance: Well-developed.   HENT:      Head: Normocephalic.      Right Ear: External ear normal.      Left Ear: External ear normal.   Neck:      Vascular: No JVD.   Pulmonary:      Effort: Pulmonary effort is normal. No respiratory distress.      Breath sounds: Normal breath sounds. No stridor. No rales.   Cardiovascular:      Normal rate. Regular rhythm.      No gallop.    Pulses:     Intact distal pulses.   Edema:     Peripheral edema absent.   Abdominal:      General: Bowel sounds are normal. There is no distension.      Palpations: Abdomen is soft.      Tenderness: There is no abdominal tenderness. There is no guarding.   Musculoskeletal: Normal range of motion.         General: No tenderness.      Cervical back: Normal range of motion. Skin:     General: Skin is warm.   Neurological:      Mental Status: Alert and oriented to person, place, and time.      Deep Tendon Reflexes: Reflexes are normal and symmetric.   Psychiatric:         Judgment: Judgment normal.           ECG 12 Lead    Date/Time: 1/23/2024 2:51 PM  Performed by: Argenis Bhatt APRN    Authorized by: Argenis Bhatt APRN  Comparison: compared with previous ECG from 10/9/2023  Similar to previous ECG  Rhythm: sinus rhythm  Rate: bradycardic  BPM: 57  Conduction: non-specific intraventricular conduction delay    Clinical impression: abnormal EKG          Interpretation Summary         Left ventricular ejection fraction is normal (Calculated EF = 60%).    Myocardial perfusion imaging indicates a normal myocardial perfusion study with no evidence of ischemia.    Impressions are consistent with a low  risk study.    Findings consistent with a normal ECG stress test.    Interpretation Summary         Left ventricular ejection fraction appears to be 46 - 50%.    Left ventricular diastolic function was normal.    The left atrial cavity is mildly dilated.    Left atrial volume is mildly increased.    The right atrial cavity is mildly  dilated.    Estimated right ventricular systolic pressure from tricuspid regurgitation is normal (<35 mmHg).      Current Outpatient Medications:     alendronate (FOSAMAX) 70 MG tablet, , Disp: , Rfl:     amLODIPine (NORVASC) 5 MG tablet, Take 1 tablet by mouth Daily., Disp: , Rfl:     apixaban (ELIQUIS) 5 MG tablet tablet, Take 1 tablet by mouth Every 12 (Twelve) Hours., Disp: 180 tablet, Rfl: 3    atorvastatin (LIPITOR) 40 MG tablet, , Disp: , Rfl:     bisoprolol-hydrochlorothiazide (ZIAC) 5-6.25 MG per tablet, Take 1 tablet by mouth Daily., Disp: 30 tablet, Rfl: 11    Farxiga 10 MG tablet, take 1 tablet (10 mg) by mouth once daily in the morning, Disp: , Rfl:     furosemide (LASIX) 20 MG tablet, Take 1 tablet by mouth Daily., Disp: 30 tablet, Rfl: 11    glipizide (GLUCOTROL XL) 10 MG 24 hr tablet, , Disp: , Rfl:     Mirabegron (MYRBETRIQ PO), Take  by mouth., Disp: , Rfl:     omeprazole (priLOSEC) 20 MG capsule, , Disp: , Rfl:     pioglitazone (ACTOS) 30 MG tablet, , Disp: , Rfl:     thiamine (VITAMIN B-1) 100 MG tablet  tablet, Take 1 tablet by mouth Daily., Disp: , Rfl:     traMADol (ULTRAM) 50 MG tablet, , Disp: , Rfl:     losartan (COZAAR) 100 MG tablet, , Disp: , Rfl:     metFORMIN (GLUCOPHAGE) 1000 MG tablet, , Disp: , Rfl:      Assessment:        Patient Active Problem List   Diagnosis    Abnormal EKG    Coronary artery disease of native artery of native heart with stable angina pectoris    Chronic anticoagulation    Paroxysmal atrial fibrillation    CVA (cerebrovascular accident)    Status post placement of implantable loop recorder    Primary hypertension     CHADS-VASc Risk  Assessment              5 Total Score    1 Hypertension    2 Age >/= 75    1 DM    1 Sex: Female        Criteria that do not apply:    CHF    PRIOR STROKE/TIA/THROMBO    Vascular Disease    Age 65-74                    Plan:   1.  Paroxysmal atrial fibrillation: SR today in office.     2.  Loop recorder:monitored    3.  Coronary artery disease: reports chest pressure and shortness of breath, stress test in November negative, last cath in 2007 per patient and reports that she had stent in 2007. Will proceed with right and left heart cath.     Procedures, risks, and options of cardiac cath explained to patient, including but not limited to MI, Stroke, death, infections, hemorrhage. Patient understands well and agrees, all questions answered.    4.  Hypertension: 114/69 today in office.     5.  Hyperlipidemia: reports her pcp manages her cholesterol and labs. Continue atorvastatin    6.  Lower extremity edema: resolved, advised to hold furosemide and take as needed for LE swelling.     7. CKD/hypokalemia: She showed me labs from her PCP office yesterday creatinine 2.1, K3.1.  Stopping furosemide as above.  She reports she has potassium at home advised to take 10 mEq once today and follow-up with nephrologist.             No diagnosis found.    There are no diagnoses linked to this encounter.    COUNSELING: susi Coffey was given to patient for the following topics: diagnostic results, risk factor reductions, impressions, risks and benefits of treatment options and importance of treatment compliance .       SMOKING COUNSELING: denies    Proceed with Right and Left cath as above. Will obtain clearance from nephrologist    Sincerely,   STARR Mott  [facility]  01/23/24  14:40 EST    EMR Dragon/Transcription disclaimer:   Much of this encounter note is an electronic transcription/translation of spoken language to printed text. The electronic translation of spoken language  may permit erroneous, or at times, nonsensical words or phrases to be inadvertently transcribed; Although I have reviewed the note for such errors, some may still exist.

## 2024-01-24 RX ORDER — BISOPROLOL FUMARATE AND HYDROCHLOROTHIAZIDE 5; 6.25 MG/1; MG/1
1 TABLET ORAL DAILY
Qty: 90 TABLET | Refills: 3 | Status: SHIPPED | OUTPATIENT
Start: 2024-01-24

## 2024-01-25 ENCOUNTER — TELEPHONE (OUTPATIENT)
Dept: CARDIOLOGY | Facility: CLINIC | Age: 79
End: 2024-01-25
Payer: MEDICARE

## 2024-01-25 NOTE — TELEPHONE ENCOUNTER
----- Message from STARR Mott sent at 1/24/2024  4:11 PM EST -----  Regarding: needs admitted day before cath for renal clearance  She needs to be admitted day before cath for renal clearance please  Once booked please call and let patient know her kidney function looks a little worse so I discussed with Dr WALKER and he wants her admitted the day before to have nephrologist see her and clear her    Thank you    Argenis    I CALLED AND INFORMED PATIENT THAT SHE WILL BE ADMITTED ON JAN 30 AND GAVE HER ALL INSTRUCTIONS   SHE AGREED AND UNDERSTOOD

## 2024-01-30 ENCOUNTER — APPOINTMENT (OUTPATIENT)
Dept: ULTRASOUND IMAGING | Facility: HOSPITAL | Age: 79
DRG: 287 | End: 2024-01-30
Payer: MEDICARE

## 2024-01-30 ENCOUNTER — APPOINTMENT (OUTPATIENT)
Dept: GENERAL RADIOLOGY | Facility: HOSPITAL | Age: 79
DRG: 287 | End: 2024-01-30
Payer: MEDICARE

## 2024-01-30 ENCOUNTER — HOSPITAL ENCOUNTER (INPATIENT)
Facility: HOSPITAL | Age: 79
LOS: 1 days | Discharge: HOME | DRG: 287 | End: 2024-02-01
Attending: INTERNAL MEDICINE | Admitting: INTERNAL MEDICINE
Payer: MEDICARE

## 2024-01-30 DIAGNOSIS — R07.2 PRECORDIAL PAIN: ICD-10-CM

## 2024-01-30 DIAGNOSIS — R06.02 SOB (SHORTNESS OF BREATH): ICD-10-CM

## 2024-01-30 DIAGNOSIS — I25.118 CORONARY ARTERY DISEASE OF NATIVE ARTERY OF NATIVE HEART WITH STABLE ANGINA PECTORIS: ICD-10-CM

## 2024-01-30 LAB
ALBUMIN SERPL-MCNC: 3.7 G/DL (ref 3.5–5.2)
ALBUMIN/GLOB SERPL: 1.3 G/DL
ALP SERPL-CCNC: 54 U/L (ref 39–117)
ALT SERPL W P-5'-P-CCNC: 12 U/L (ref 1–33)
ANION GAP SERPL CALCULATED.3IONS-SCNC: 15 MMOL/L (ref 5–15)
AST SERPL-CCNC: 24 U/L (ref 1–32)
BASOPHILS # BLD AUTO: 0.03 10*3/MM3 (ref 0–0.2)
BASOPHILS NFR BLD AUTO: 0.6 % (ref 0–1.5)
BILIRUB SERPL-MCNC: 0.6 MG/DL (ref 0–1.2)
BILIRUB UR QL STRIP: NEGATIVE
BUN SERPL-MCNC: 55 MG/DL (ref 8–23)
BUN/CREAT SERPL: 27.9 (ref 7–25)
CALCIUM SPEC-SCNC: 9.2 MG/DL (ref 8.6–10.5)
CHLORIDE SERPL-SCNC: 103 MMOL/L (ref 98–107)
CHLORIDE UR-SCNC: 33 MMOL/L
CK SERPL-CCNC: 47 U/L (ref 20–180)
CLARITY UR: CLEAR
CO2 SERPL-SCNC: 19 MMOL/L (ref 22–29)
COLOR UR: YELLOW
CREAT SERPL-MCNC: 1.97 MG/DL (ref 0.57–1)
CREAT UR-MCNC: 56 MG/DL
CREAT UR-MCNC: 56.8 MG/DL
DEPRECATED RDW RBC AUTO: 48.1 FL (ref 37–54)
EGFRCR SERPLBLD CKD-EPI 2021: 25.5 ML/MIN/1.73
EOSINOPHIL # BLD AUTO: 0.06 10*3/MM3 (ref 0–0.4)
EOSINOPHIL NFR BLD AUTO: 1.1 % (ref 0.3–6.2)
ERYTHROCYTE [DISTWIDTH] IN BLOOD BY AUTOMATED COUNT: 13.5 % (ref 12.3–15.4)
GLOBULIN UR ELPH-MCNC: 2.8 GM/DL
GLUCOSE BLDC GLUCOMTR-MCNC: 153 MG/DL (ref 70–130)
GLUCOSE BLDC GLUCOMTR-MCNC: 226 MG/DL (ref 70–130)
GLUCOSE SERPL-MCNC: 192 MG/DL (ref 65–99)
GLUCOSE UR STRIP-MCNC: ABNORMAL MG/DL
HCT VFR BLD AUTO: 35.2 % (ref 34–46.6)
HGB BLD-MCNC: 10.8 G/DL (ref 12–15.9)
HGB UR QL STRIP.AUTO: NEGATIVE
IMM GRANULOCYTES # BLD AUTO: 0.02 10*3/MM3 (ref 0–0.05)
IMM GRANULOCYTES NFR BLD AUTO: 0.4 % (ref 0–0.5)
KETONES UR QL STRIP: ABNORMAL
LEUKOCYTE ESTERASE UR QL STRIP.AUTO: NEGATIVE
LYMPHOCYTES # BLD AUTO: 1.04 10*3/MM3 (ref 0.7–3.1)
LYMPHOCYTES NFR BLD AUTO: 19.7 % (ref 19.6–45.3)
MCH RBC QN AUTO: 29.5 PG (ref 26.6–33)
MCHC RBC AUTO-ENTMCNC: 30.7 G/DL (ref 31.5–35.7)
MCV RBC AUTO: 96.2 FL (ref 79–97)
MONOCYTES # BLD AUTO: 0.37 10*3/MM3 (ref 0.1–0.9)
MONOCYTES NFR BLD AUTO: 7 % (ref 5–12)
NEUTROPHILS NFR BLD AUTO: 3.76 10*3/MM3 (ref 1.7–7)
NEUTROPHILS NFR BLD AUTO: 71.2 % (ref 42.7–76)
NITRITE UR QL STRIP: NEGATIVE
NRBC BLD AUTO-RTO: 0 /100 WBC (ref 0–0.2)
NT-PROBNP SERPL-MCNC: 908 PG/ML (ref 0–1800)
PH UR STRIP.AUTO: <=5 [PH] (ref 5–8)
PLATELET # BLD AUTO: 207 10*3/MM3 (ref 140–450)
PMV BLD AUTO: 11.4 FL (ref 6–12)
POTASSIUM SERPL-SCNC: 4.5 MMOL/L (ref 3.5–5.2)
PROT ?TM UR-MCNC: 25.6 MG/DL
PROT ?TM UR-MCNC: 7.5 MG/DL
PROT SERPL-MCNC: 6.5 G/DL (ref 6–8.5)
PROT UR QL STRIP: NEGATIVE
PROT/CREAT UR: 132 MG/G CREA (ref 0–200)
RBC # BLD AUTO: 3.66 10*6/MM3 (ref 3.77–5.28)
SODIUM SERPL-SCNC: 137 MMOL/L (ref 136–145)
SODIUM UR-SCNC: 52 MMOL/L
SP GR UR STRIP: 1.02 (ref 1–1.03)
UROBILINOGEN UR QL STRIP: ABNORMAL
WBC NRBC COR # BLD AUTO: 5.28 10*3/MM3 (ref 3.4–10.8)

## 2024-01-30 PROCEDURE — 82570 ASSAY OF URINE CREATININE: CPT

## 2024-01-30 PROCEDURE — 63710000001 INSULIN LISPRO (HUMAN) PER 5 UNITS: Performed by: HOSPITALIST

## 2024-01-30 PROCEDURE — 99222 1ST HOSP IP/OBS MODERATE 55: CPT | Performed by: NURSE PRACTITIONER

## 2024-01-30 PROCEDURE — 80053 COMPREHEN METABOLIC PANEL: CPT

## 2024-01-30 PROCEDURE — 84156 ASSAY OF PROTEIN URINE: CPT

## 2024-01-30 PROCEDURE — G0378 HOSPITAL OBSERVATION PER HR: HCPCS

## 2024-01-30 PROCEDURE — 83880 ASSAY OF NATRIURETIC PEPTIDE: CPT | Performed by: HOSPITALIST

## 2024-01-30 PROCEDURE — 82948 REAGENT STRIP/BLOOD GLUCOSE: CPT

## 2024-01-30 PROCEDURE — 82550 ASSAY OF CK (CPK): CPT

## 2024-01-30 PROCEDURE — 84300 ASSAY OF URINE SODIUM: CPT

## 2024-01-30 PROCEDURE — 85025 COMPLETE CBC W/AUTO DIFF WBC: CPT

## 2024-01-30 PROCEDURE — 63710000001 INSULIN LISPRO (HUMAN) PER 5 UNITS: Performed by: INTERNAL MEDICINE

## 2024-01-30 PROCEDURE — 71045 X-RAY EXAM CHEST 1 VIEW: CPT

## 2024-01-30 PROCEDURE — 76775 US EXAM ABDO BACK WALL LIM: CPT

## 2024-01-30 PROCEDURE — 82436 ASSAY OF URINE CHLORIDE: CPT | Performed by: INTERNAL MEDICINE

## 2024-01-30 PROCEDURE — 81003 URINALYSIS AUTO W/O SCOPE: CPT

## 2024-01-30 RX ORDER — BISOPROLOL FUMARATE 5 MG/1
5 TABLET, FILM COATED ORAL
Status: DISCONTINUED | OUTPATIENT
Start: 2024-01-30 | End: 2024-01-31

## 2024-01-30 RX ORDER — ASPIRIN 81 MG/1
81 TABLET, CHEWABLE ORAL DAILY
Status: DISCONTINUED | OUTPATIENT
Start: 2024-01-30 | End: 2024-02-01 | Stop reason: HOSPADM

## 2024-01-30 RX ORDER — IBUPROFEN 600 MG/1
1 TABLET ORAL
Status: DISCONTINUED | OUTPATIENT
Start: 2024-01-30 | End: 2024-02-01 | Stop reason: HOSPADM

## 2024-01-30 RX ORDER — SODIUM CHLORIDE 0.9 % (FLUSH) 0.9 %
10 SYRINGE (ML) INJECTION EVERY 12 HOURS SCHEDULED
Status: DISCONTINUED | OUTPATIENT
Start: 2024-01-30 | End: 2024-01-30

## 2024-01-30 RX ORDER — SODIUM CHLORIDE 9 MG/ML
40 INJECTION, SOLUTION INTRAVENOUS AS NEEDED
Status: DISCONTINUED | OUTPATIENT
Start: 2024-01-30 | End: 2024-02-01 | Stop reason: HOSPADM

## 2024-01-30 RX ORDER — INSULIN LISPRO 100 [IU]/ML
2-7 INJECTION, SOLUTION INTRAVENOUS; SUBCUTANEOUS
Status: DISCONTINUED | OUTPATIENT
Start: 2024-01-30 | End: 2024-02-01 | Stop reason: HOSPADM

## 2024-01-30 RX ORDER — NICOTINE POLACRILEX 4 MG
15 LOZENGE BUCCAL
Status: DISCONTINUED | OUTPATIENT
Start: 2024-01-30 | End: 2024-02-01 | Stop reason: HOSPADM

## 2024-01-30 RX ORDER — AMLODIPINE BESYLATE 5 MG/1
5 TABLET ORAL DAILY
Status: DISCONTINUED | OUTPATIENT
Start: 2024-01-30 | End: 2024-02-01 | Stop reason: HOSPADM

## 2024-01-30 RX ORDER — SODIUM CHLORIDE 0.9 % (FLUSH) 0.9 %
10 SYRINGE (ML) INJECTION AS NEEDED
Status: DISCONTINUED | OUTPATIENT
Start: 2024-01-30 | End: 2024-02-01 | Stop reason: HOSPADM

## 2024-01-30 RX ORDER — GLIPIZIDE 5 MG/1
5 TABLET ORAL
Status: DISCONTINUED | OUTPATIENT
Start: 2024-01-30 | End: 2024-01-30

## 2024-01-30 RX ORDER — ATORVASTATIN CALCIUM 20 MG/1
40 TABLET, FILM COATED ORAL NIGHTLY
Status: DISCONTINUED | OUTPATIENT
Start: 2024-01-30 | End: 2024-02-01 | Stop reason: HOSPADM

## 2024-01-30 RX ORDER — DEXTROSE MONOHYDRATE 25 G/50ML
25 INJECTION, SOLUTION INTRAVENOUS
Status: DISCONTINUED | OUTPATIENT
Start: 2024-01-30 | End: 2024-02-01 | Stop reason: HOSPADM

## 2024-01-30 RX ORDER — NITROGLYCERIN 0.4 MG/1
0.4 TABLET SUBLINGUAL
Status: DISCONTINUED | OUTPATIENT
Start: 2024-01-30 | End: 2024-01-30

## 2024-01-30 RX ORDER — FAMOTIDINE 20 MG/1
20 TABLET, FILM COATED ORAL 2 TIMES DAILY
Status: DISCONTINUED | OUTPATIENT
Start: 2024-01-30 | End: 2024-02-01 | Stop reason: HOSPADM

## 2024-01-30 RX ADMIN — INSULIN LISPRO 3 UNITS: 100 INJECTION, SOLUTION INTRAVENOUS; SUBCUTANEOUS at 21:38

## 2024-01-30 RX ADMIN — INSULIN LISPRO 2 UNITS: 100 INJECTION, SOLUTION INTRAVENOUS; SUBCUTANEOUS at 17:56

## 2024-01-30 RX ADMIN — Medication 10 ML: at 15:20

## 2024-01-30 RX ADMIN — FAMOTIDINE 20 MG: 20 TABLET, FILM COATED ORAL at 21:38

## 2024-01-30 RX ADMIN — SODIUM BICARBONATE: 84 INJECTION, SOLUTION INTRAVENOUS at 17:56

## 2024-01-30 RX ADMIN — ATORVASTATIN CALCIUM 40 MG: 20 TABLET, FILM COATED ORAL at 21:38

## 2024-01-30 RX ADMIN — ASPIRIN 81 MG: 81 TABLET, CHEWABLE ORAL at 17:56

## 2024-01-30 NOTE — Clinical Note
Hemostasis started on the right radial artery. R-Band was used in achieving hemostasis. Radial compression device applied to vessel. Hemostasis achieved successfully. Closure device additional comment: Vasc band 13cc air

## 2024-01-30 NOTE — CONSULTS
INITIAL CONSULT NOTE      Patient Name: Michelle Atkinson  : 1945  MRN: [id number]  Primary Care Physician: Samy Leon Jr., MD  Date of admission: 2024    Patient Care Team:  Samy Leon Jr., MD as PCP - General (Family Medicine)        Reason for Consult:       Renal clearance for right and left heart cath    Subjective   History of Present Illness:   Chief Complaint: No chief complaint on file.    HISTORY:  Michelle Atkinson is a 78 y.o. female with past medical history of CKD III etiology likely vascular disease and diabetes with only prior sCr on file 1.2023, CAD s/p stents in  and , s/p loop recorder, HTN, hx stroke, DM2, lung cancer, Afib. She was admitted by Cardiology needing right and left cardiac catheterization, sees Dr Smith. Last echocardiogram on 23 with EF 46-50%, LV diastolic function normal, left atrial cavity mildly dilated, left atrial volume mildly increased, right atrial cavity mildly dilated, RVSP <35 mmHg. Labs significant for sCr 1.97, BUN 55, bicarb 19 with normal anion gap of 15, glucose 192, hgb 10.8. Home medications include farxiga, lasix, amlodipine, bisoprolol-HCTZ. Nephrology consulted for renal clearance for right and left heart cath.      Personal History:     Past Medical History:   Past Medical History:   Diagnosis Date    CAD (coronary artery disease)     Essential (primary) hypertension     Stroke     Type 2 diabetes mellitus        Surgical History:      Past Surgical History:   Procedure Laterality Date    CARDIAC ELECTROPHYSIOLOGY PROCEDURE N/A 10/18/2023    Procedure: Loop insertion- Hemal;  Surgeon: Kath Smith MD;  Location: [facility] INVASIVE LOCATION;  Service: Cardiovascular;  Laterality: N/A;    CATARACT EXTRACTION Bilateral     CORONARY STENT PLACEMENT      x2    HYSTEROTOMY      PARTIAL       Family History: family history includes Heart disease in her brother, father, and mother. Otherwise  pertinent FHx was reviewed and unremarkable.     Social History:  reports that she has quit smoking. Her smoking use included cigarettes. She has never used smokeless tobacco. She reports that she does not drink alcohol and does not use drugs.    Medications:  Prior to Admission medications    Medication Sig Start Date End Date Taking? Authorizing Provider   alendronate (FOSAMAX) 70 MG tablet  8/11/23  Yes Sharon Barajas MD   amLODIPine (NORVASC) 5 MG tablet Take 1 tablet by mouth Daily. 1/1/24  Yes Sharon Barajas MD   apixaban (ELIQUIS) 5 MG tablet tablet Take 1 tablet by mouth Every 12 (Twelve) Hours. 1/12/24  Yes Kath Smith MD   atorvastatin (LIPITOR) 40 MG tablet Every Night. 9/19/23  Yes Sharon Barajas MD   bisoprolol-hydrochlorothiazide (ZIAC) 5-6.25 MG per tablet Take 1 tablet by mouth Daily. 1/24/24  Yes Kath Smith MD   Farxiga 10 MG tablet take 1 tablet (10 mg) by mouth once daily in the morning 1/8/24  Yes Sharon Barajas MD   glipizide (GLUCOTROL XL) 10 MG 24 hr tablet 2 (Two) Times a Day. 9/19/23  Yes Sharon Barajas MD   Mirabegron (MYRBETRIQ PO) Take  by mouth.   Yes Sharon Barajas MD   omeprazole (priLOSEC) 20 MG capsule  9/19/23  Yes Sharon Barajas MD   pioglitazone (ACTOS) 30 MG tablet  8/6/23  Yes Sharon Barajas MD   furosemide (LASIX) 20 MG tablet Take 1 tablet by mouth Daily. 11/28/23   Argenis Bhatt APRN   thiamine (VITAMIN B-1) 100 MG tablet  tablet Take 1 tablet by mouth Daily.    Provider, MD Sharon     Scheduled Meds:amLODIPine, 5 mg, Oral, Daily  atorvastatin, 40 mg, Oral, Nightly  bisoprolol, 5 mg, Oral, Q24H  glipizide, 5 mg, Oral, BID AC  sodium chloride, 10 mL, Intravenous, Q12H      Continuous Infusions:   PRN Meds:  nitroglycerin    sodium chloride    sodium chloride  Allergies:    Allergies   Allergen Reactions    Codeine Nausea And Vomiting       Objective   Exam:     Vital Signs  Heart Rate:   [56] 56  Resp:  [18] 18  BP: (134)/(60) 134/60  SpO2:  [100 %] 100 %  on   ;   Device (Oxygen Therapy): room air  Body mass index is 23.81 kg/m².        Results Review:  I have personally reviewed most recent Data :  [facility]  CBC    Results from last 7 days   Lab Units 01/23/24  1524   WBC 10*3/mm3 6.51   HEMOGLOBIN g/dL 11.3*   PLATELETS 10*3/mm3 288     CMP   Results from last 7 days   Lab Units 01/23/24  1524   SODIUM mmol/L 138   POTASSIUM mmol/L 3.3*   CHLORIDE mmol/L 99   CO2 mmol/L 21.0*   BUN mg/dL 56*   CREATININE mg/dL 2.05*   GLUCOSE mg/dL 193*     ABG      No radiology results for the last 7 days    Results for orders placed in visit on 10/09/23    Adult Transthoracic Echo Complete W/ Cont if Necessary Per Protocol    Interpretation Summary    Left ventricular ejection fraction appears to be 46 - 50%.    Left ventricular diastolic function was normal.    The left atrial cavity is mildly dilated.    Left atrial volume is mildly increased.    The right atrial cavity is mildly  dilated.    Estimated right ventricular systolic pressure from tricuspid regurgitation is normal (<35 mmHg).        Assessment & Plan   Assessment and Plan:         * No active hospital problems. *    ASSESSMENT:  HAYLEE likely possible prerenal, now needing right and left heart cath; on CKD III etiology likely vascular disease and diabetes with only prior sCr on file 1.22 November 2023; CKD III etiology likely diabetic and hypertensive nephropathy  Mild NAGMA  CAD s/p stents in 2002 and 2007; Last echocardiogram on 11/2/23 with EF 46-50%, LV diastolic function normal, left atrial cavity mildly dilated, left atrial volume mildly increased, right atrial cavity mildly dilated, RVSP <35 mmHg.   HTN on amlodipine, lasix, bisoprolol-HCTZ  Hx stroke  DM2 on farxiga  Hx lung cancer  Afib  Anemia      PLAN :     HAYLEE likely prerenal was on diuretics, with sCr 1.9, last sCr on 1/23/24 was 2.05; baseline looks to be ~1.22 as  of November 2023; CKD III  Patient has mild metabolic acidosis will check lactic acid.  Will start half-normal saline with 75 mEq of sodium bicarb at 50 cc/h.  Order chest x-ray.  Will order urine protein, creatinine, electrolytes.  Plans for right and left heart cath, will coordinate with Cardiology  Electrolytes acceptable  Hemodynamically stable  Strict I&O's  Avoid NSAIDs  We will follow  Thank you for this consultation!          STARR Figueredo  [facility]  1/30/2024  13:06 EST  The note was transcribed by  STARR.   I have reviewed the history, data, problems, assessment and plan .and I concur .      Beka Olivas MD  [facility]  1/30/2024  15:14 EST

## 2024-01-30 NOTE — H&P
[facility]  History & Physical Note                                                                                    Patient Identification:  Michelle Atkinson:   78 y.o.  female  1945  [id number]            No chief complaint on file.      Date of Admission 1/30/24    Admitting Physician: Dr. Smith    Reason for Admission:renal failure, needs consult prior to cath on 1/31, No chief complaint on file.      History of Present Illness:     Michelle Atkinson is a 78-year-old female who is here today for admission for right and left cardiac cath.  She has a history to include CAD, hypertension, stroke and history of diabetes type 2. She is on anticoagulation due to to history of 2 episodes of atrial fibrillation during chemotherapy for lung cancer.  She underwent a loop implant to monitor. In October 2023 she did recently have an significant shortness of breath when seen in the office on 1/23/2024.    Loop placement 10/18/2023.  Echo 11/2/2023 revealed EF 46 to 50%, normal LV function.  LAC mildly dilated, L a volume mildly increased.  RAC mildly dilated.  Stress test in November 2023 myocardial perfusion imaging indicated a normal study with no evidence of ischemia.    Cardiac Risk Factors:    Past Medical History:  Past Medical History:   Diagnosis Date    CAD (coronary artery disease)     Essential (primary) hypertension     Stroke     Type 2 diabetes mellitus     at least 3 stable    Past Surgical History:  Past Surgical History:   Procedure Laterality Date    CARDIAC ELECTROPHYSIOLOGY PROCEDURE N/A 10/18/2023    Procedure: Loop insertion- Hemal;  Surgeon: Kath Smith MD;  Location: [facility] INVASIVE LOCATION;  Service: Cardiovascular;  Laterality: N/A;    CATARACT EXTRACTION Bilateral     CORONARY STENT PLACEMENT      x2    HYSTEROTOMY      PARTIAL        Social History:   Social History     Tobacco Use    Smoking status: Former     Types: Cigarettes    Smokeless tobacco:  "Never   Substance Use Topics    Alcohol use: Never        Family History:  Family History   Problem Relation Age of Onset    Heart disease Mother     Heart disease Father     Heart disease Brother           Allergies:  Allergies   Allergen Reactions    Codeine Nausea And Vomiting       Home Meds:  No current facility-administered medications on file prior to encounter.     Current Outpatient Medications on File Prior to Encounter   Medication Sig Dispense Refill    alendronate (FOSAMAX) 70 MG tablet       amLODIPine (NORVASC) 5 MG tablet Take 1 tablet by mouth Daily.      apixaban (ELIQUIS) 5 MG tablet tablet Take 1 tablet by mouth Every 12 (Twelve) Hours. 180 tablet 3    atorvastatin (LIPITOR) 40 MG tablet       bisoprolol-hydrochlorothiazide (ZIAC) 5-6.25 MG per tablet Take 1 tablet by mouth Daily. 90 tablet 3    Farxiga 10 MG tablet take 1 tablet (10 mg) by mouth once daily in the morning      furosemide (LASIX) 20 MG tablet Take 1 tablet by mouth Daily. 30 tablet 11    glipizide (GLUCOTROL XL) 10 MG 24 hr tablet       Mirabegron (MYRBETRIQ PO) Take  by mouth.      omeprazole (priLOSEC) 20 MG capsule       pioglitazone (ACTOS) 30 MG tablet       thiamine (VITAMIN B-1) 100 MG tablet  tablet Take 1 tablet by mouth Daily.           Scheduled Meds:  sodium chloride, 10 mL, Q12H            INTAKE AND OUTPUT:  No intake or output data in the 24 hours ending 01/30/24 1251        Review of Systems:            Constitutional:  Heart Rate:  [56] 56  Resp:  [18] 18  BP: (134)/(60) 134/60    Physical Exam:  Review of Systems  Constitutional: No wt loss, fever   Gastrointestinal: No nausea , abdominal pain  Behavioral/Psych: No insomnia or anxiety   Cardiovascular ----positive for sob. All other systems reviewed and are negative           Physical Exam  /60 (BP Location: Left arm, Patient Position: Sitting)   Pulse 56   Resp 18   Ht 157.5 cm (62\")   Wt 59.1 kg (130 lb 3.2 oz)   SpO2 100%   BMI 23.81 kg/m² "     General appearance: No acute changes   Eyes: Sclerae conjunctivae normal, pupils reactive   HENT: Atraumatic; oropharynx clear with moist mucous membranes and no mucosal ulcerations;  Neck: Trachea midline; NECK, supple, no thyromegaly or lymphadenopathy   Lungs: Normal size and shape, normal breath sounds, equal distribution of air, no rales and rhonchi   CV: S1-S2 regular, no murmurs, no rub, no gallop   Abdomen: Soft, nontender; no masses , no abnormal abdominal sounds   Extremities: No deformity , normal color , no peripheral edema   Skin: Normal temperature, turgor and texture; no rash, ulcers  Psych: Appropriate affect, alert and oriented to person, place and time                 Cardiographics              ECHO:    Interpretation Summary         Left ventricular ejection fraction appears to be 46 - 50%.    Left ventricular diastolic function was normal.    The left atrial cavity is mildly dilated.    Left atrial volume is mildly increased.    The right atrial cavity is mildly  dilated.    Estimated right ventricular systolic pressure from tricuspid regurgitation is normal (<35 mmHg).   Interpretation Summary         Left ventricular ejection fraction is normal (Calculated EF = 60%).    Myocardial perfusion imaging indicates a normal myocardial perfusion study with no evidence of ischemia.    Impressions are consistent with a low risk study.    Findings consistent with a normal ECG stress test.     Asymptomatic for chest pain. No ECG evidence of ischemia  Ectopy: PVC in recovery  Blood pressure response:  appropriate.   Pharmacologic study due to inability to tolerate ambulation on treadmill, ambulates with cane,    Supervised by:  Argenis CLEMENTS.      Stress test 2023 2023 Conclusion         Successful loop recorder implantation    Lab Review:          Results from last 7 days   Lab Units 01/23/24  1524   SODIUM mmol/L 138   POTASSIUM mmol/L 3.3*   BUN mg/dL 56*   CREATININE mg/dL 2.05*  "  CALCIUM mg/dL 9.9     @LABRCNTbnp@  Results from last 7 days   Lab Units 01/23/24  1524   WBC 10*3/mm3 6.51   HEMOGLOBIN g/dL 11.3*   HEMATOCRIT % 35.6   PLATELETS 10*3/mm3 288     Results from last 7 days   Lab Units 01/23/24  1524   INR  1.35*   APTT seconds 33.7            CHADS-VASc Risk Assessment              7 Total Score    1 Hypertension    2 Age >/= 75    1 DM    2 PRIOR STROKE/TIA/THROMBO    1 Sex: Female        Criteria that do not apply:    CHF    Vascular Disease    Age 65-74               Assessment / Plan:    Paroxysmal atrial fibrillation  Shortness of breath  diabetes mellitus type 2   hypertension  History of CVA  CAD with stent placed in 2002, and 2007      Recommendations:      This is a 78-year-old female who is well-known to our service.  She has a history of diabetes mellitus type 2, hypertension, history of CVA and CAD.  She presented to [facility] as a direct admit for increasing shortness of breath.  She is here to undergo a right and left cardiac cath once we receive renal clearance from nephrology.       Procedure, risks and options of cardiac cath explained to pt INCLUDING BUT NOT LIMITED TO MI, STROKE, DEATH, INFECTION HAEMORRHAGE, . Pt understands well and agrees with no further questions.    Kath Smith MD    1/30/2024  12:51 EST    EMR Dragon/Transcription:   \"Dictated utilizing Dragon dictation\".     "

## 2024-01-30 NOTE — Clinical Note
Hemostasis started on the right brachial vein. Manual pressure applied to vessel. Manual pressure was held by a [place] rtr. Manual pressure was held for 5 min. Hemostasis achieved successfully. Closure device additional comment: 4x4's and tegaderm

## 2024-01-30 NOTE — CONSULTS
"Internal medicine consult    Referring physician  Dr. WALKER    Chief complaint  Shortness of breath    History of present illness  78-year-old white female with history of coronary artery disease diabetes mellitus hypertension CVA and chronic kidney disease admitted to cardiology service for cardiac catheterization and I am asked to follow patient for medical problem.  At the time of examination she has no specific complaint but she does have exertional shortness of breath.  Patient denies any chest pain palpitation leg swelling.  Patient has no fever chills cough congestion night sweats weight loss or weight gain.    Past Medical History:              Date    Coronary artery disease      Hypertension      CVA      Diabetes mellitus  Atrial fibrillation  Chronic kidney disease stage III           Past Surgical History:              Procedure Laterality Date    CARDIAC ELECTROPHYSIOLOGY PROCEDURE N/A 10/18/2023     Procedure: Loop insertion- Hemal;  Surgeon: Kath Smith MD;  Location: [facility] INVASIVE LOCATION;  Service: Cardiovascular;  Laterality: N/A;    CATARACT EXTRACTION Bilateral      CORONARY STENT PLACEMENT         x2    HYSTEROTOMY         PARTIAL         Social History:               Tobacco Use    Smoking status: Former       Types: Cigarettes    Smokeless tobacco: Never   Substance Use Topics    Alcohol use: Never      Family History:           Problem Relation Age of Onset    Heart disease Mother      Heart disease Father      Heart disease Brother        Allergies:          Allergen Reactions    Codeine Nausea And Vomiting      Home Meds: Reviewed    Review of Systems  Per history of present illness    Physical Exam  Blood pressure 124/50, pulse 57, resp. rate 18, height 157.5 cm (62\"), weight 59.1 kg (130 lb 3.2 oz), SpO2 100%.    General appearance: Awake and alert in no distress   HEENT: Unremarkable  NECK   Supple, no thyromegaly or lymphadenopathy   Lungs: Normal effort and moving air " bilaterally  CV: S1-S2 regular, no murmurs, no rub, no gallop   Abdomen: Soft, nontender; nondistended bowel sounds positive  Extremities: No deformity , normal color , no peripheral edema   Skin: Normal temperature, turgor and texture; no rash, ulcers  Psych: Appropriate affect, alert and oriented to person, place and time      LABS  Lab Results (last 24 hours)       Procedure Component Value Units Date/Time    Protein, Urine, Random - Urine, Clean Catch [[id number]] Collected: 01/30/24 1436    Specimen: Urine, Clean Catch Updated: 01/30/24 1552    Creatinine Urine Random (kidney function) GFR component - Urine, Clean Catch [[id number]] Collected: 01/30/24 1436    Specimen: Urine, Clean Catch Updated: 01/30/24 1552    Chloride, Urine, Random - Urine, Clean Catch [[id number]] Collected: 01/30/24 1436    Specimen: Urine, Clean Catch Updated: 01/30/24 1552    Sodium, Urine, Random - Urine, Clean Catch [[id number]] Collected: 01/30/24 1436    Specimen: Urine, Clean Catch Updated: 01/30/24 1508     Sodium, Urine 52 mmol/L     Narrative:      Reference intervals for random urine have not been established.  Clinical usage is dependent upon physician's interpretation in combination with other laboratory tests.       Protein / Creatinine Ratio, Urine - Urine, Clean Catch [[id number]] Collected: 01/30/24 1436    Specimen: Urine, Clean Catch Updated: 01/30/24 1507     Protein/Creatinine Ratio, Urine 132.0 mg/G Crea      Creatinine, Urine 56.8 mg/dL      Total Protein, Urine 7.5 mg/dL     Urinalysis With Microscopic If Indicated (No Culture) - Urine, Clean Catch [[id number]]  (Abnormal) Collected: 01/30/24 1436    Specimen: Urine, Clean Catch Updated: 01/30/24 1452     Color, UA Yellow     Appearance, UA Clear     pH, UA <=5.0     Specific Gravity, UA 1.022     Glucose, UA >=1000 mg/dL (3+)     Ketones, UA Trace     Bilirubin, UA Negative     Blood, UA Negative     Protein, UA Negative     Leuk Esterase, UA Negative     Nitrite, UA  Negative     Urobilinogen, UA 0.2 E.U./dL    Narrative:      Urine microscopic not indicated.    CK [[id number]]  (Normal) Collected: 01/30/24 1232    Specimen: Blood Updated: 01/30/24 1346     Creatine Kinase 47 U/L     Comprehensive Metabolic Panel [[id number]]  (Abnormal) Collected: 01/30/24 1232    Specimen: Blood Updated: 01/30/24 1330     Glucose 192 mg/dL      BUN 55 mg/dL      Creatinine 1.97 mg/dL      Sodium 137 mmol/L      Potassium 4.5 mmol/L      Comment: Specimen hemolyzed.  Result may be falsely elevated.        Chloride 103 mmol/L      CO2 19.0 mmol/L      Calcium 9.2 mg/dL      Total Protein 6.5 g/dL      Albumin 3.7 g/dL      ALT (SGPT) 12 U/L      Comment: Specimen hemolyzed.  Result may  be falsely elevated.        AST (SGOT) 24 U/L      Comment: Specimen hemolyzed.  Result may be falsely elevated.        Alkaline Phosphatase 54 U/L      Total Bilirubin 0.6 mg/dL      Globulin 2.8 gm/dL      A/G Ratio 1.3 g/dL      BUN/Creatinine Ratio 27.9     Anion Gap 15.0 mmol/L      eGFR 25.5 mL/min/1.73     Narrative:      GFR Normal >60  Chronic Kidney Disease <60  Kidney Failure <15    The GFR formula is only valid for adults with stable renal function between ages 18 and 70.    CBC Auto Differential [[id number]]  (Abnormal) Collected: 01/30/24 1232    Specimen: Blood Updated: 01/30/24 1309     WBC 5.28 10*3/mm3      RBC 3.66 10*6/mm3      Hemoglobin 10.8 g/dL      Hematocrit 35.2 %      MCV 96.2 fL      MCH 29.5 pg      MCHC 30.7 g/dL      RDW 13.5 %      RDW-SD 48.1 fl      MPV 11.4 fL      Platelets 207 10*3/mm3      Neutrophil % 71.2 %      Lymphocyte % 19.7 %      Monocyte % 7.0 %      Eosinophil % 1.1 %      Basophil % 0.6 %      Immature Grans % 0.4 %      Neutrophils, Absolute 3.76 10*3/mm3      Lymphocytes, Absolute 1.04 10*3/mm3      Monocytes, Absolute 0.37 10*3/mm3      Eosinophils, Absolute 0.06 10*3/mm3      Basophils, Absolute 0.03 10*3/mm3      Immature Grans, Absolute 0.02 10*3/mm3      nRBC  0.0 /100 WBC           Imaging Results (Last 24 Hours)       Procedure Component Value Units Date/Time    XR Chest 1 View [[id number]] Resulted: 01/30/24 1548     Updated: 01/30/24 1548            Current Facility-Administered Medications:     amLODIPine (NORVASC) tablet 5 mg, 5 mg, Oral, Daily, Kath Smith MD    aspirin chewable tablet 81 mg, 81 mg, Oral, Daily, Casimiro Burks MD    atorvastatin (LIPITOR) tablet 40 mg, 40 mg, Oral, Nightly, Kath Smith MD    bisoprolol (ZEBeta) tablet 5 mg, 5 mg, Oral, Q24H, Kath Smith MD    dextrose (D50W) (25 g/50 mL) IV injection 25 g, 25 g, Intravenous, Q15 Min PRN, Casimiro Burks MD    dextrose (GLUTOSE) oral gel 15 g, 15 g, Oral, Q15 Min PRN, Casimiro Burks MD    famotidine (PEPCID) tablet 20 mg, 20 mg, Oral, BID, Casimiro Burks MD    glucagon (GLUCAGEN) injection 1 mg, 1 mg, Intramuscular, Q15 Min PRN, Casimiro Burks MD    insulin lispro (HUMALOG/ADMELOG) injection 2-7 Units, 2-7 Units, Subcutaneous, 4x Daily AC & at Bedtime, Casimiro Burks MD    sodium chloride 0.45 % 925 mL with sodium bicarbonate 8.4 % 75 mEq infusion, , Intravenous, Continuous, Beka Olivas MD    sodium chloride 0.9 % flush 10 mL, 10 mL, Intravenous, PRN, Argenis Bhatt, APRN    sodium chloride 0.9 % infusion 40 mL, 40 mL, Intravenous, PRN, Argenis Bhatt, APRMELONIE     ASSESSMENT  Dyspnea with known coronary artery disease admitted for cardiac catheterization  Diabetes mellitus  Hypertension  Hyperlipidemia  Chronic anemia  Chronic kidney disease stage III    PLAN  Agree with current care  IVF per nephrology  Cardiac catheterization in a.m.  Accu-Chek with low-dose sliding scale insulin  Adjust home medications  Stress ulcer DVT prophylaxis  Supportive care  Patient is full code  Discussed with family nursing staff  Will follow with Dr. WALKER and further recommendation current hospital course    CASIMIRO BURKS MD

## 2024-01-31 PROBLEM — N19 RENAL FAILURE: Status: ACTIVE | Noted: 2024-01-31

## 2024-01-31 LAB
ALBUMIN SERPL-MCNC: 3.3 G/DL (ref 3.5–5.2)
ALBUMIN/GLOB SERPL: 1.6 G/DL
ALP SERPL-CCNC: 46 U/L (ref 39–117)
ALT SERPL W P-5'-P-CCNC: 11 U/L (ref 1–33)
ANION GAP SERPL CALCULATED.3IONS-SCNC: 12 MMOL/L (ref 5–15)
AST SERPL-CCNC: 22 U/L (ref 1–32)
BASOPHILS # BLD AUTO: 0.02 10*3/MM3 (ref 0–0.2)
BASOPHILS NFR BLD AUTO: 0.4 % (ref 0–1.5)
BILIRUB SERPL-MCNC: 0.5 MG/DL (ref 0–1.2)
BUN SERPL-MCNC: 44 MG/DL (ref 8–23)
BUN/CREAT SERPL: 26.7 (ref 7–25)
CALCIUM SPEC-SCNC: 9.1 MG/DL (ref 8.6–10.5)
CHLORIDE SERPL-SCNC: 109 MMOL/L (ref 98–107)
CHOLEST SERPL-MCNC: 192 MG/DL (ref 0–200)
CO2 SERPL-SCNC: 20 MMOL/L (ref 22–29)
CREAT SERPL-MCNC: 1.65 MG/DL (ref 0.57–1)
D-LACTATE SERPL-SCNC: 0.7 MMOL/L (ref 0.5–2)
DEPRECATED RDW RBC AUTO: 47.7 FL (ref 37–54)
EGFRCR SERPLBLD CKD-EPI 2021: 31.5 ML/MIN/1.73
EOSINOPHIL # BLD AUTO: 0.18 10*3/MM3 (ref 0–0.4)
EOSINOPHIL NFR BLD AUTO: 4 % (ref 0.3–6.2)
ERYTHROCYTE [DISTWIDTH] IN BLOOD BY AUTOMATED COUNT: 13.5 % (ref 12.3–15.4)
GLOBULIN UR ELPH-MCNC: 2.1 GM/DL
GLUCOSE BLDC GLUCOMTR-MCNC: 105 MG/DL (ref 70–130)
GLUCOSE BLDC GLUCOMTR-MCNC: 107 MG/DL (ref 70–130)
GLUCOSE BLDC GLUCOMTR-MCNC: 289 MG/DL (ref 70–130)
GLUCOSE BLDC GLUCOMTR-MCNC: 75 MG/DL (ref 70–130)
GLUCOSE BLDC GLUCOMTR-MCNC: 79 MG/DL (ref 70–130)
GLUCOSE SERPL-MCNC: 65 MG/DL (ref 65–99)
HBA1C MFR BLD: 9 % (ref 4.8–5.6)
HCT VFR BLD AUTO: 31.5 % (ref 34–46.6)
HCT VFR BLDA CALC: 28 % (ref 38–51)
HDLC SERPL-MCNC: 46 MG/DL (ref 40–60)
HGB BLD-MCNC: 10 G/DL (ref 12–15.9)
HGB BLDA-MCNC: 9.5 G/DL (ref 12–17)
IMM GRANULOCYTES # BLD AUTO: 0.01 10*3/MM3 (ref 0–0.05)
IMM GRANULOCYTES NFR BLD AUTO: 0.2 % (ref 0–0.5)
LDLC SERPL CALC-MCNC: 111 MG/DL (ref 0–100)
LDLC/HDLC SERPL: 2.3 {RATIO}
LYMPHOCYTES # BLD AUTO: 1.53 10*3/MM3 (ref 0.7–3.1)
LYMPHOCYTES NFR BLD AUTO: 34.4 % (ref 19.6–45.3)
MCH RBC QN AUTO: 30.9 PG (ref 26.6–33)
MCHC RBC AUTO-ENTMCNC: 31.7 G/DL (ref 31.5–35.7)
MCV RBC AUTO: 97.2 FL (ref 79–97)
MONOCYTES # BLD AUTO: 0.42 10*3/MM3 (ref 0.1–0.9)
MONOCYTES NFR BLD AUTO: 9.4 % (ref 5–12)
NEUTROPHILS NFR BLD AUTO: 2.29 10*3/MM3 (ref 1.7–7)
NEUTROPHILS NFR BLD AUTO: 51.6 % (ref 42.7–76)
NRBC BLD AUTO-RTO: 0 /100 WBC (ref 0–0.2)
PLATELET # BLD AUTO: 167 10*3/MM3 (ref 140–450)
PMV BLD AUTO: 11.4 FL (ref 6–12)
POTASSIUM SERPL-SCNC: 3.2 MMOL/L (ref 3.5–5.2)
PROT SERPL-MCNC: 5.4 G/DL (ref 6–8.5)
RBC # BLD AUTO: 3.24 10*6/MM3 (ref 3.77–5.28)
SAO2 % BLDA: 62 % (ref 95–98)
SODIUM SERPL-SCNC: 141 MMOL/L (ref 136–145)
TRIGL SERPL-MCNC: 200 MG/DL (ref 0–150)
TSH SERPL DL<=0.05 MIU/L-ACNC: 4.54 UIU/ML (ref 0.27–4.2)
VLDLC SERPL-MCNC: 35 MG/DL (ref 5–40)
WBC NRBC COR # BLD AUTO: 4.45 10*3/MM3 (ref 3.4–10.8)

## 2024-01-31 PROCEDURE — 80053 COMPREHEN METABOLIC PANEL: CPT | Performed by: NURSE PRACTITIONER

## 2024-01-31 PROCEDURE — 85025 COMPLETE CBC W/AUTO DIFF WBC: CPT | Performed by: NURSE PRACTITIONER

## 2024-01-31 PROCEDURE — 82810 BLOOD GASES O2 SAT ONLY: CPT

## 2024-01-31 PROCEDURE — 84443 ASSAY THYROID STIM HORMONE: CPT | Performed by: HOSPITALIST

## 2024-01-31 PROCEDURE — 25010000002 MIDAZOLAM PER 1 MG: Performed by: INTERNAL MEDICINE

## 2024-01-31 PROCEDURE — C1769 GUIDE WIRE: HCPCS | Performed by: INTERNAL MEDICINE

## 2024-01-31 PROCEDURE — 25010000002 FENTANYL CITRATE (PF) 50 MCG/ML SOLUTION: Performed by: INTERNAL MEDICINE

## 2024-01-31 PROCEDURE — 25510000001 IOPAMIDOL PER 1 ML: Performed by: INTERNAL MEDICINE

## 2024-01-31 PROCEDURE — 25010000002 HEPARIN (PORCINE) PER 1000 UNITS: Performed by: INTERNAL MEDICINE

## 2024-01-31 PROCEDURE — 80061 LIPID PANEL: CPT | Performed by: HOSPITALIST

## 2024-01-31 PROCEDURE — 85018 HEMOGLOBIN: CPT

## 2024-01-31 PROCEDURE — 93460 R&L HRT ART/VENTRICLE ANGIO: CPT | Performed by: INTERNAL MEDICINE

## 2024-01-31 PROCEDURE — C1894 INTRO/SHEATH, NON-LASER: HCPCS | Performed by: INTERNAL MEDICINE

## 2024-01-31 PROCEDURE — 83036 HEMOGLOBIN GLYCOSYLATED A1C: CPT | Performed by: HOSPITALIST

## 2024-01-31 PROCEDURE — 4A023N8 MEASUREMENT OF CARDIAC SAMPLING AND PRESSURE, BILATERAL, PERCUTANEOUS APPROACH: ICD-10-PCS | Performed by: INTERNAL MEDICINE

## 2024-01-31 PROCEDURE — 82948 REAGENT STRIP/BLOOD GLUCOSE: CPT

## 2024-01-31 PROCEDURE — 63710000001 INSULIN LISPRO (HUMAN) PER 5 UNITS: Performed by: INTERNAL MEDICINE

## 2024-01-31 PROCEDURE — B2111ZZ FLUOROSCOPY OF MULTIPLE CORONARY ARTERIES USING LOW OSMOLAR CONTRAST: ICD-10-PCS | Performed by: INTERNAL MEDICINE

## 2024-01-31 PROCEDURE — 83605 ASSAY OF LACTIC ACID: CPT | Performed by: INTERNAL MEDICINE

## 2024-01-31 PROCEDURE — 85014 HEMATOCRIT: CPT

## 2024-01-31 PROCEDURE — B2151ZZ FLUOROSCOPY OF LEFT HEART USING LOW OSMOLAR CONTRAST: ICD-10-PCS | Performed by: INTERNAL MEDICINE

## 2024-01-31 RX ORDER — NITROGLYCERIN 0.4 MG/1
0.4 TABLET SUBLINGUAL
Status: DISCONTINUED | OUTPATIENT
Start: 2024-01-31 | End: 2024-02-01 | Stop reason: HOSPADM

## 2024-01-31 RX ORDER — SODIUM CHLORIDE 9 MG/ML
INJECTION, SOLUTION INTRAVENOUS
Status: COMPLETED | OUTPATIENT
Start: 2024-01-31 | End: 2024-01-31

## 2024-01-31 RX ORDER — BISOPROLOL FUMARATE 5 MG/1
2.5 TABLET, FILM COATED ORAL
Status: DISCONTINUED | OUTPATIENT
Start: 2024-02-01 | End: 2024-02-01 | Stop reason: HOSPADM

## 2024-01-31 RX ORDER — LIDOCAINE HYDROCHLORIDE 20 MG/ML
INJECTION, SOLUTION INFILTRATION; PERINEURAL
Status: DISCONTINUED | OUTPATIENT
Start: 2024-01-31 | End: 2024-01-31 | Stop reason: HOSPADM

## 2024-01-31 RX ORDER — FENTANYL CITRATE 50 UG/ML
INJECTION, SOLUTION INTRAMUSCULAR; INTRAVENOUS
Status: DISCONTINUED | OUTPATIENT
Start: 2024-01-31 | End: 2024-01-31 | Stop reason: HOSPADM

## 2024-01-31 RX ORDER — HEPARIN SODIUM 1000 [USP'U]/ML
INJECTION, SOLUTION INTRAVENOUS; SUBCUTANEOUS
Status: DISCONTINUED | OUTPATIENT
Start: 2024-01-31 | End: 2024-01-31 | Stop reason: HOSPADM

## 2024-01-31 RX ORDER — MIDAZOLAM HYDROCHLORIDE 1 MG/ML
INJECTION INTRAMUSCULAR; INTRAVENOUS
Status: DISCONTINUED | OUTPATIENT
Start: 2024-01-31 | End: 2024-01-31 | Stop reason: HOSPADM

## 2024-01-31 RX ORDER — POTASSIUM CHLORIDE 750 MG/1
40 TABLET, FILM COATED, EXTENDED RELEASE ORAL ONCE
Status: COMPLETED | OUTPATIENT
Start: 2024-01-31 | End: 2024-01-31

## 2024-01-31 RX ORDER — VERAPAMIL HYDROCHLORIDE 2.5 MG/ML
INJECTION, SOLUTION INTRAVENOUS
Status: DISCONTINUED | OUTPATIENT
Start: 2024-01-31 | End: 2024-01-31 | Stop reason: HOSPADM

## 2024-01-31 RX ORDER — ACETAMINOPHEN 325 MG/1
650 TABLET ORAL EVERY 4 HOURS PRN
Status: DISCONTINUED | OUTPATIENT
Start: 2024-01-31 | End: 2024-02-01 | Stop reason: HOSPADM

## 2024-01-31 RX ADMIN — INSULIN LISPRO 4 UNITS: 100 INJECTION, SOLUTION INTRAVENOUS; SUBCUTANEOUS at 20:36

## 2024-01-31 RX ADMIN — POTASSIUM CHLORIDE 40 MEQ: 750 TABLET, EXTENDED RELEASE ORAL at 08:46

## 2024-01-31 RX ADMIN — ATORVASTATIN CALCIUM 40 MG: 20 TABLET, FILM COATED ORAL at 20:36

## 2024-01-31 RX ADMIN — FAMOTIDINE 20 MG: 20 TABLET, FILM COATED ORAL at 20:36

## 2024-01-31 RX ADMIN — BISOPROLOL FUMARATE 5 MG: 5 TABLET, FILM COATED ORAL at 10:30

## 2024-01-31 NOTE — DISCHARGE INSTRUCTIONS
[facility]  [address] [place] [address]    Coronary Angiogram (Radial/Ulnar Approach) After Care    Refer to this sheet in the next few weeks. These instructions provide you with information on caring for yourself after your procedure. Your caregiver may also give you more specific instructions. Your treatment has been planned according to current medical practices, but problems sometimes occur. Call your caregiver if you have any problems or questions after your procedure.    Home Care Instructions:  You may shower the day after the procedure. Remove the bandage (dressing) and gently wash the site with plain soap and water. Gently pat the site dry. You may apply a band aid daily for 2 days if desired.    Do not apply powder or lotion to the site.  Do not submerge the affected site in water for 3 to 5 days or until the site is completely healed.   Do not lift, push or pull anything over 5 pounds for 5 days after your procedure or as directed by your physician.  As a reference, a gallon of milk weighs 8 pounds.   Inspect the site at least twice daily. You may notice some bruising at the site and it may be tender for 1 to 2 weeks.     Increase your fluid intake for the next 2 days.    Keep arm elevated for 24 hours. For the remainder of the day, keep your arm in “Pledge of Allegiance” position when up and about.     You may drive 24 hours after the procedure unless otherwise instructed by your caregiver.  Do not operate machinery or power tools for 24 hours.  A responsible adult should be with you for the first 24 hours after you arrive home. Do not make any important legal decisions or sign legal papers for 24 hours.  Do not drink alcohol for 24 hours.    Metformin or any medications containing Metformin should not be taken for 48 hours after your procedure.      Call Your Doctor if:   You have unusual pain at the radial/ulnar (wrist) site.  You have redness, warmth, swelling, or pain at the  radial/ulnar (wrist) site.  You have drainage (other than a small amount of blood on the dressing).  `You have chills or a fever > 101.  Your arm becomes pale or dark, cool, tingly, or numb.  You develop chest pain, shortness of breath, feel faint or pass out.    You have heavy bleeding from the site, hold pressure on the site for 20 minutes.  If the bleeding stops, apply a fresh bandage and call your cardiologist.  However, if you        continue to have bleeding, call 911 and continue to apply pressure to the site.   You have any symptoms of a stroke.  Remember BE FAST  B-balance. Sudden trouble walking or loss of balance.  E-eyes.  Sudden changes in how you see or a sudden onset of a very bad headache.   F-face. Sudden weakness or loss of feeling of the face or facial droop on one side.   A-arms Sudden weakness or numbness in one arm.  One arm drifts down if they are both held out in front of you. This happens suddenly and usually on one side of the body.   S-speech.  Sudden trouble speaking, slurred speech or trouble understanding what are saying.   T-time  Time to call emergency services.  Write down the symptoms and the time they started.

## 2024-01-31 NOTE — CONSULTS
INITIAL CONSULT NOTE      Patient Name: Michelle Atkinson  : 1945  MRN: [id number]  Primary Care Physician: Samy Leon Jr., MD  Date of admission: 2024    Patient Care Team:  Samy Leon Jr., MD as PCP - General (Family Medicine)        Reason for Consult:       Renal clearance for right and left heart cath    Subjective   History of Present Illness:   Chief Complaint: No chief complaint on file.    HISTORY:  Michelle Atkinson is a 78 y.o. female with past medical history of CKD III etiology likely vascular disease and diabetes with only prior sCr on file 1.2023, CAD s/p stents in  and , s/p loop recorder, HTN, hx stroke, DM2, lung cancer, Afib. She was admitted by Cardiology needing right and left cardiac catheterization, sees Dr Smith. Last echocardiogram on 23 with EF 46-50%, LV diastolic function normal, left atrial cavity mildly dilated, left atrial volume mildly increased, right atrial cavity mildly dilated, RVSP <35 mmHg. Labs significant for sCr 1.97, BUN 55, bicarb 19 with normal anion gap of 15, glucose 192, hgb 10.8. Home medications include farxiga, lasix, amlodipine, bisoprolol-HCTZ. Nephrology consulted for renal clearance for right and left heart cath.        ROS:       12 points ROS performed   Weakness and chest pain        Past Medical History:   Past Medical History:   Diagnosis Date    CAD (coronary artery disease)     Essential (primary) hypertension     Stroke     Type 2 diabetes mellitus        Surgical History:      Past Surgical History:   Procedure Laterality Date    CARDIAC ELECTROPHYSIOLOGY PROCEDURE N/A 10/18/2023    Procedure: Loop insertion- Hemal;  Surgeon: Kath Smith MD;  Location: [facility] CATH INVASIVE LOCATION;  Service: Cardiovascular;  Laterality: N/A;    CATARACT EXTRACTION Bilateral     CORONARY STENT PLACEMENT      x2    HYSTEROTOMY      PARTIAL       Family History: family history includes Heart  disease in her brother, father, and mother. Otherwise pertinent FHx was reviewed and unremarkable.     Social History:  reports that she has quit smoking. Her smoking use included cigarettes. She has never used smokeless tobacco. She reports that she does not drink alcohol and does not use drugs.    Medications:  Prior to Admission medications    Medication Sig Start Date End Date Taking? Authorizing Provider   alendronate (FOSAMAX) 70 MG tablet  8/11/23  Yes Sharon Barajas MD   amLODIPine (NORVASC) 5 MG tablet Take 1 tablet by mouth Daily. 1/1/24  Yes Sharon Barajas MD   apixaban (ELIQUIS) 5 MG tablet tablet Take 1 tablet by mouth Every 12 (Twelve) Hours. 1/12/24  Yes Kath Smith MD   atorvastatin (LIPITOR) 40 MG tablet Every Night. 9/19/23  Yes Sharon Barajas MD   bisoprolol-hydrochlorothiazide (ZIAC) 5-6.25 MG per tablet Take 1 tablet by mouth Daily. 1/24/24  Yes Kath Smith MD   Farxiga 10 MG tablet take 1 tablet (10 mg) by mouth once daily in the morning 1/8/24  Yes Sharon Barajas MD   glipizide (GLUCOTROL XL) 10 MG 24 hr tablet 2 (Two) Times a Day. 9/19/23  Yes Sharon Barajas MD   Mirabegron (MYRBETRIQ PO) Take  by mouth.   Yes ProviderSharon MD   omeprazole (priLOSEC) 20 MG capsule  9/19/23  Yes Sharon Barajas MD   pioglitazone (ACTOS) 30 MG tablet  8/6/23  Yes ProviderSharon MD   furosemide (LASIX) 20 MG tablet Take 1 tablet by mouth Daily. 11/28/23   Argenis Bhatt APRN   thiamine (VITAMIN B-1) 100 MG tablet  tablet Take 1 tablet by mouth Daily.    Provider, MD Sharon     Scheduled Meds:amLODIPine, 5 mg, Oral, Daily  aspirin, 81 mg, Oral, Daily  atorvastatin, 40 mg, Oral, Nightly  bisoprolol, 5 mg, Oral, Q24H  famotidine, 20 mg, Oral, BID  insulin lispro, 2-7 Units, Subcutaneous, 4x Daily AC & at Bedtime      Continuous Infusions:sodium chloride 0.45 % 925 mL with sodium bicarbonate 8.4 % 75 mEq infusion, , Last Rate: 75  mL/hr at 01/31/24 0554      PRN Meds:  dextrose    dextrose    glucagon (human recombinant)    sodium chloride    sodium chloride  Allergies:    Allergies   Allergen Reactions    Codeine Nausea And Vomiting       Objective   Exam:     Vital Signs  Temp:  [97.7 °F (36.5 °C)-97.9 °F (36.6 °C)] 97.9 °F (36.6 °C)  Heart Rate:  [54-65] 54  Resp:  [18] 18  BP: (120-134)/(50-60) 132/56  SpO2:  [97 %-100 %] 99 %  on   ;   Device (Oxygen Therapy): room air  Body mass index is 23.81 kg/m².        General appearance: Awake and alert in no distress   HEENT: Unremarkable  NECK   Supple, no thyromegaly or lymphadenopathy   Lungs: Normal effort and moving air bilaterally  CV: S1-S2 regular, no murmurs, no rub, no gallop   Abdomen: Soft, nontender; nondistended bowel sounds positive  Extremities: No deformity , normal color , no peripheral edema   Skin: Normal temperature, turgor and texture; no rash, ulcers  Psych: Appropriate affect, alert and oriented to person, place and time         Results Review:  I have personally reviewed most recent Data :  [facility]  CBC    Results from last 7 days   Lab Units 01/31/24  0442 01/30/24  1232   WBC 10*3/mm3 4.45 5.28   HEMOGLOBIN g/dL 10.0* 10.8*   PLATELETS 10*3/mm3 167 207     CMP   Results from last 7 days   Lab Units 01/31/24  0442 01/30/24  1232   SODIUM mmol/L 141 137   POTASSIUM mmol/L 3.2* 4.5   CHLORIDE mmol/L 109* 103   CO2 mmol/L 20.0* 19.0*   BUN mg/dL 44* 55*   CREATININE mg/dL 1.65* 1.97*   GLUCOSE mg/dL 65 192*   ALBUMIN g/dL 3.3* 3.7   BILIRUBIN mg/dL 0.5 0.6   ALK PHOS U/L 46 54   AST (SGOT) U/L 22 24   ALT (SGPT) U/L 11 12     ABG      US Renal Bilateral    Result Date: 1/30/2024  1. Echogenic kidneys, compatible with renal parenchymal disease, without hydronephrosis seen  2. Simple appearing renal cyst, not requiring follow-up.  This report was finalized on 1/30/2024 5:19 PM by Dr. Prashant Baker M.D on Workstation: [id number]      XR Chest 1  View    Result Date: 1/30/2024  1. Mild cardiomegaly. 2. Underinflation of the lungs with some patchy increased density in the right infrahilar region as described. 3. Follow-up films with improved inspiration may be helpful.   This report was finalized on 1/30/2024 4:16 PM by Dr. Cosmo Tomlinson M.D on Workstation: [id number]       Results for orders placed in visit on 10/09/23    Adult Transthoracic Echo Complete W/ Cont if Necessary Per Protocol    Interpretation Summary    Left ventricular ejection fraction appears to be 46 - 50%.    Left ventricular diastolic function was normal.    The left atrial cavity is mildly dilated.    Left atrial volume is mildly increased.    The right atrial cavity is mildly  dilated.    Estimated right ventricular systolic pressure from tricuspid regurgitation is normal (<35 mmHg).        Assessment & Plan   Assessment and Plan:         * No active hospital problems. *    ASSESSMENT:  HAYLEE likely possible prerenal, now needing right and left heart cath; on CKD III etiology likely vascular disease and diabetes with only prior sCr on file 1.22 November 2023; CKD III etiology likely diabetic and hypertensive nephropathy  Mild NAGMA  CAD s/p stents in 2002 and 2007; Last echocardiogram on 11/2/23 with EF 46-50%, LV diastolic function normal, left atrial cavity mildly dilated, left atrial volume mildly increased, right atrial cavity mildly dilated, RVSP <35 mmHg.   HTN on amlodipine, lasix, bisoprolol-HCTZ  Hx stroke  DM2 on farxiga  Hx lung cancer  Afib  Anemia      PLAN :     HAYLEE likely prerenal was on diuretics, with sCr 1.9, last sCr on 1/23/24 was 2.05; baseline looks to be ~1.22 as of November 2023; CKD III  Patient has mild metabolic acidosis  which is better with ivf with bicarb  Renal fx close to to baseline   D/w pt risk of BRENT and wants to proceed with cardiac cath  Continue  half-normal saline with 75 mEq of sodium bicarb at 50 cc/h.  chest x-ray.cardiomegaly   Renal us  echgogenic kidneys , right [id number] atrophic 8.5 cm, left 9.5 cm  Bilateral; renal cysts  Urine completely bland, UPCR 132 mg  Plans for right and left heart cath, ok to proceed  Will stop ivf after cath  Electrolytes acceptable  Hemodynamically stable  Strict I&O's  Avoid NSAIDs  We will follow  Thank you for this consultation!    Pt was seen and examined by Dr JEZ Olivas, note transcribed By STARR Figueredo APRN  [facility]  1/31/2024  08:58 EST

## 2024-01-31 NOTE — PROGRESS NOTES
"Daily progress note    Referring physician  Dr. WALKER    Subjective  Awake and alert and feeling same with no new complaints and denies any chest pain shortness of breath and palpitation.  Patient anxious about procedure and family at bedside with a lot of questions.    History of present illness  78-year-old white female with history of coronary artery disease diabetes mellitus hypertension CVA and chronic kidney disease admitted to cardiology service for cardiac catheterization and I am asked to follow patient for medical problem.  At the time of examination she has no specific complaint but she does have exertional shortness of breath.  Patient denies any chest pain palpitation leg swelling.  Patient has no fever chills cough congestion night sweats weight loss or weight gain.    Review of Systems  Per history of present illness    Physical Exam  Blood pressure 137/59, pulse 54, temperature 97.9 °F (36.6 °C), temperature source Oral, resp. rate 16, height 157.5 cm (62\"), weight 59.1 kg (130 lb 3.2 oz), SpO2 95%.    General appearance: Awake and alert in no distress   HEENT: Unremarkable  NECK   Supple, no thyromegaly or lymphadenopathy   Lungs: Normal effort and moving air bilaterally  CV: S1-S2 regular, no murmurs, no rub, no gallop   Abdomen: Soft, nontender; nondistended bowel sounds positive  Extremities: No deformity , normal color , no peripheral edema   Skin: Normal temperature, turgor and texture; no rash, ulcers  Psych: Appropriate affect, alert and oriented to person, place and time      LABS  Lab Results (last 24 hours)       Procedure Component Value Units Date/Time    POC Glucose Once [[id number]]  (Normal) Collected: 01/31/24 1138    Specimen: Blood Updated: 01/31/24 1139     Glucose 105 mg/dL     POC Glucose Once [[id number]]  (Normal) Collected: 01/31/24 0615    Specimen: Blood Updated: 01/31/24 0616     Glucose 75 mg/dL     TSH [[id number]]  (Abnormal) Collected: 01/31/24 0442    Specimen: Blood " Updated: 01/31/24 0518     TSH 4.540 uIU/mL     Comprehensive Metabolic Panel [[id number]]  (Abnormal) Collected: 01/31/24 0442    Specimen: Blood Updated: 01/31/24 0517     Glucose 65 mg/dL      BUN 44 mg/dL      Creatinine 1.65 mg/dL      Sodium 141 mmol/L      Potassium 3.2 mmol/L      Comment: Slight hemolysis detected by analyzer. Result may be falsely elevated.        Chloride 109 mmol/L      CO2 20.0 mmol/L      Calcium 9.1 mg/dL      Total Protein 5.4 g/dL      Albumin 3.3 g/dL      ALT (SGPT) 11 U/L      AST (SGOT) 22 U/L      Alkaline Phosphatase 46 U/L      Total Bilirubin 0.5 mg/dL      Globulin 2.1 gm/dL      A/G Ratio 1.6 g/dL      BUN/Creatinine Ratio 26.7     Anion Gap 12.0 mmol/L      eGFR 31.5 mL/min/1.73     Narrative:      GFR Normal >60  Chronic Kidney Disease <60  Kidney Failure <15    The GFR formula is only valid for adults with stable renal function between ages 18 and 70.    Lipid Panel [[id number]]  (Abnormal) Collected: 01/31/24 0442    Specimen: Blood Updated: 01/31/24 0511     Total Cholesterol 192 mg/dL      Triglycerides 200 mg/dL      HDL Cholesterol 46 mg/dL      LDL Cholesterol  111 mg/dL      VLDL Cholesterol 35 mg/dL      LDL/HDL Ratio 2.30    Narrative:      Cholesterol Reference Ranges  (U.S. Department of Health and Human Services ATP III Classifications)    Desirable          <200 mg/dL  Borderline High    200-239 mg/dL  High Risk          >240 mg/dL      Triglyceride Reference Ranges  (U.S. Department of Health and Human Services ATP III Classifications)    Normal           <150 mg/dL  Borderline High  150-199 mg/dL  High             200-499 mg/dL  Very High        >500 mg/dL    HDL Reference Ranges  (U.S. Department of Health and Human Services ATP III Classifications)    Low     <40 mg/dl (major risk factor for CHD)  High    >60 mg/dl ('negative' risk factor for CHD)        LDL Reference Ranges  (U.S. Department of Health and Human Services ATP III  Classifications)    Optimal          <100 mg/dL  Near Optimal     100-129 mg/dL  Borderline High  130-159 mg/dL  High             160-189 mg/dL  Very High        >189 mg/dL    Lactic Acid, Plasma [[id number]]  (Normal) Collected: 01/31/24 0442    Specimen: Blood Updated: 01/31/24 0506     Lactate 0.7 mmol/L     Hemoglobin A1c [[id number]]  (Abnormal) Collected: 01/31/24 0442    Specimen: Blood Updated: 01/31/24 0502     Hemoglobin A1C 9.00 %     Narrative:      Hemoglobin A1C Ranges:    Increased Risk for Diabetes  5.7% to 6.4%  Diabetes                     >= 6.5%  Diabetic Goal                < 7.0%    CBC & Differential [[id number]]  (Abnormal) Collected: 01/31/24 0442    Specimen: Blood Updated: 01/31/24 0448    Narrative:      The following orders were created for panel order CBC & Differential.  Procedure                               Abnormality         Status                     ---------                               -----------         ------                     CBC Auto Differential[[id number]]        Abnormal            Final result                 Please view results for these tests on the individual orders.    CBC Auto Differential [[id number]]  (Abnormal) Collected: 01/31/24 0442    Specimen: Blood Updated: 01/31/24 0448     WBC 4.45 10*3/mm3      RBC 3.24 10*6/mm3      Hemoglobin 10.0 g/dL      Hematocrit 31.5 %      MCV 97.2 fL      MCH 30.9 pg      MCHC 31.7 g/dL      RDW 13.5 %      RDW-SD 47.7 fl      MPV 11.4 fL      Platelets 167 10*3/mm3      Neutrophil % 51.6 %      Lymphocyte % 34.4 %      Monocyte % 9.4 %      Eosinophil % 4.0 %      Basophil % 0.4 %      Immature Grans % 0.2 %      Neutrophils, Absolute 2.29 10*3/mm3      Lymphocytes, Absolute 1.53 10*3/mm3      Monocytes, Absolute 0.42 10*3/mm3      Eosinophils, Absolute 0.18 10*3/mm3      Basophils, Absolute 0.02 10*3/mm3      Immature Grans, Absolute 0.01 10*3/mm3      nRBC 0.0 /100 WBC     POC Glucose Once [[id number]]  (Abnormal) Collected:  01/30/24 2125    Specimen: Blood Updated: 01/30/24 2126     Glucose 226 mg/dL     POC Glucose Once [[id number]]  (Abnormal) Collected: 01/30/24 1732    Specimen: Blood Updated: 01/30/24 1733     Glucose 153 mg/dL     Chloride, Urine, Random - Urine, Clean Catch [[id number]] Collected: 01/30/24 1436    Specimen: Urine, Clean Catch Updated: 01/30/24 1649     Chloride, Urine 33 mmol/L     Narrative:      Reference intervals for random urine have not been established.  Clinical usage is dependent upon physician's interpretation in combination with other laboratory tests.       Protein, Urine, Random - Urine, Clean Catch [[id number]] Collected: 01/30/24 1436    Specimen: Urine, Clean Catch Updated: 01/30/24 1648     Total Protein, Urine 25.6 mg/dL     Narrative:      Reference intervals for random urine have not been established.  Clinical usage is dependent upon physician's interpretation in combination with other laboratory tests.       Creatinine Urine Random (kidney function) GFR component - Urine, Clean Catch [[id number]] Collected: 01/30/24 1436    Specimen: Urine, Clean Catch Updated: 01/30/24 1648     Creatinine, Urine 56.0 mg/dL     Narrative:      Reference intervals for random urine have not been established.  Clinical usage is dependent upon physician's interpretation in combination with other laboratory tests.       BNP [[id number]]  (Normal) Collected: 01/30/24 1232    Specimen: Blood Updated: 01/30/24 1631     proBNP 908.0 pg/mL     Narrative:      This assay is used as an aid in the diagnosis of individuals suspected of having heart failure. It can be used as an aid in the diagnosis of acute decompensated heart failure (ADHF) in patients presenting with signs and symptoms of ADHF to the emergency department (ED). In addition, NT-proBNP of <300 pg/mL indicates ADHF is not likely.    Age Range Result Interpretation  NT-proBNP Concentration (pg/mL:      <50             Positive            >450                    Gray                 300-450                    Negative             <300    50-75           Positive            >900                  Gray                300-900                  Negative            <300      >75             Positive            >1800                  Gray                300-1800                  Negative            <300    Sodium, Urine, Random - Urine, Clean Catch [[id number]] Collected: 01/30/24 1436    Specimen: Urine, Clean Catch Updated: 01/30/24 1508     Sodium, Urine 52 mmol/L     Narrative:      Reference intervals for random urine have not been established.  Clinical usage is dependent upon physician's interpretation in combination with other laboratory tests.       Protein / Creatinine Ratio, Urine - Urine, Clean Catch [[id number]] Collected: 01/30/24 1436    Specimen: Urine, Clean Catch Updated: 01/30/24 1507     Protein/Creatinine Ratio, Urine 132.0 mg/G Crea      Creatinine, Urine 56.8 mg/dL      Total Protein, Urine 7.5 mg/dL     Urinalysis With Microscopic If Indicated (No Culture) - Urine, Clean Catch [[id number]]  (Abnormal) Collected: 01/30/24 1436    Specimen: Urine, Clean Catch Updated: 01/30/24 1452     Color, UA Yellow     Appearance, UA Clear     pH, UA <=5.0     Specific Gravity, UA 1.022     Glucose, UA >=1000 mg/dL (3+)     Ketones, UA Trace     Bilirubin, UA Negative     Blood, UA Negative     Protein, UA Negative     Leuk Esterase, UA Negative     Nitrite, UA Negative     Urobilinogen, UA 0.2 E.U./dL    Narrative:      Urine microscopic not indicated.          Imaging Results (Last 24 Hours)       Procedure Component Value Units Date/Time    US Renal Bilateral [[id number]] Collected: 01/30/24 1718     Updated: 01/30/24 1722    Narrative:      Examination: Renal sonogram     TECHNIQUE: Sonographic images of the kidneys and urinary bladder were  obtained     HISTORY: Acute on chronic renal disease     COMPARISON: None available     FINDINGS: The kidneys are echogenic, without  hydronephrosis, the right  measuring approximately 8.5 cm, and the left 9.5 cm. Simple appearing  cysts measure up to 1.8 cm on the right and 3.8 cm on the left. The  urinary bladder is grossly normal, with bilateral ureteral jets.       Impression:      1. Echogenic kidneys, compatible with renal parenchymal disease, without  hydronephrosis seen     2. Simple appearing renal cyst, not requiring follow-up.     This report was finalized on 1/30/2024 5:19 PM by Dr. Prashant Baker M.D  on Workstation: [id number]       XR Chest 1 View [[id number]] Collected: 01/30/24 1614     Updated: 01/30/24 1619    Narrative:      XR CHEST 1 VW-1/30/2024     HISTORY: Possible CHF.     Heart size is mildly enlarged. Lungs are underinflated with some  vascular crowding. There is mild patchy increased density in the right  infrahilar region which may represent chronic parenchymal change  atelectasis, asymmetric edema and/or developing pneumonia. Left lung  appears relatively clear. Bony structures appear unremarkable. Loop  recorder overlies the left lower hemithorax.     No significant pleural effusion is seen.       Impression:      1. Mild cardiomegaly.  2. Underinflation of the lungs with some patchy increased density in the  right infrahilar region as described.  3. Follow-up films with improved inspiration may be helpful.        This report was finalized on 1/30/2024 4:16 PM by Dr. Cosmo Tomlinson M.D on Workstation: [id number]               Current Facility-Administered Medications:     amLODIPine (NORVASC) tablet 5 mg, 5 mg, Oral, Daily, Kath Smith MD    [MAR Hold] aspirin chewable tablet 81 mg, 81 mg, Oral, Daily, Carlton Burks MD, 81 mg at 01/30/24 1756    [MAR Hold] atorvastatin (LIPITOR) tablet 40 mg, 40 mg, Oral, Nightly, Kath Smith MD, 40 mg at 01/30/24 2138    bisoprolol (ZEBeta) tablet 5 mg, 5 mg, Oral, Q24H, Kath Smith MD, 5 mg at 01/31/24 1030    [MAR Hold] dextrose (D50W) (25 g/50  mL) IV injection 25 g, 25 g, Intravenous, Q15 Min PRN, Casimiro Burks MD    [MAR Hold] dextrose (GLUTOSE) oral gel 15 g, 15 g, Oral, Q15 Min PRN, Casimiro Burks MD    famotidine (PEPCID) tablet 20 mg, 20 mg, Oral, BID, Casimiro Burks MD, 20 mg at 01/30/24 2138    [MAR Hold] glucagon (GLUCAGEN) injection 1 mg, 1 mg, Intramuscular, Q15 Min PRN, Casimiro Burks MD    [MAR Hold] insulin lispro (HUMALOG/ADMELOG) injection 2-7 Units, 2-7 Units, Subcutaneous, 4x Daily AC & at Bedtime, Casimiro Burks MD, 3 Units at 01/30/24 2138    sodium chloride 0.45 % 925 mL with sodium bicarbonate 8.4 % 75 mEq infusion, , Intravenous, Continuous, Beka Olivas MD, Last Rate: 75 mL/hr at 01/31/24 0554, Currently Infusing at 01/31/24 0554    [MAR Hold] sodium chloride 0.9 % flush 10 mL, 10 mL, Intravenous, PRN, Argenis Bhatt, APRN    [MAR Hold] sodium chloride 0.9 % infusion 40 mL, 40 mL, Intravenous, PRN, Argenis Bhatt, APRN     ASSESSMENT  Dyspnea with known coronary artery disease admitted for cardiac catheterization  Diabetes mellitus  Hypertension  Hyperlipidemia  Chronic anemia  Chronic kidney disease stage III    PLAN  CPM  Continue IVF   Cardiac catheterization today  Accu-Chek with low-dose sliding scale insulin  Adjust home medications  Stress ulcer DVT prophylaxis  Supportive care  Discussed with family nursing staff  Will follow with Dr. WALKER and further recommendation current hospital course    CASIMIRO BURKS MD    Copied text in this note has been reviewed and is accurate as of 01/31/24

## 2024-02-01 ENCOUNTER — READMISSION MANAGEMENT (OUTPATIENT)
Dept: CALL CENTER | Facility: HOSPITAL | Age: 79
End: 2024-02-01
Payer: MEDICARE

## 2024-02-01 VITALS
TEMPERATURE: 97.3 F | BODY MASS INDEX: 23.96 KG/M2 | DIASTOLIC BLOOD PRESSURE: 51 MMHG | RESPIRATION RATE: 18 BRPM | WEIGHT: 130.2 LBS | HEIGHT: 62 IN | SYSTOLIC BLOOD PRESSURE: 109 MMHG | HEART RATE: 57 BPM | OXYGEN SATURATION: 99 %

## 2024-02-01 DIAGNOSIS — I25.118 CORONARY ARTERY DISEASE OF NATIVE ARTERY OF NATIVE HEART WITH STABLE ANGINA PECTORIS: Primary | ICD-10-CM

## 2024-02-01 DIAGNOSIS — I48.0 PAROXYSMAL ATRIAL FIBRILLATION: ICD-10-CM

## 2024-02-01 LAB
ANION GAP SERPL CALCULATED.3IONS-SCNC: 11.2 MMOL/L (ref 5–15)
BASOPHILS # BLD AUTO: 0.03 10*3/MM3 (ref 0–0.2)
BASOPHILS NFR BLD AUTO: 0.7 % (ref 0–1.5)
BUN SERPL-MCNC: 35 MG/DL (ref 8–23)
BUN/CREAT SERPL: 23.2 (ref 7–25)
CALCIUM SPEC-SCNC: 8.6 MG/DL (ref 8.6–10.5)
CHLORIDE SERPL-SCNC: 110 MMOL/L (ref 98–107)
CO2 SERPL-SCNC: 22.8 MMOL/L (ref 22–29)
CREAT SERPL-MCNC: 1.51 MG/DL (ref 0.57–1)
DEPRECATED RDW RBC AUTO: 48.5 FL (ref 37–54)
EGFRCR SERPLBLD CKD-EPI 2021: 35 ML/MIN/1.73
EOSINOPHIL # BLD AUTO: 0.18 10*3/MM3 (ref 0–0.4)
EOSINOPHIL NFR BLD AUTO: 4.1 % (ref 0.3–6.2)
ERYTHROCYTE [DISTWIDTH] IN BLOOD BY AUTOMATED COUNT: 13.5 % (ref 12.3–15.4)
GLUCOSE BLDC GLUCOMTR-MCNC: 251 MG/DL (ref 70–130)
GLUCOSE BLDC GLUCOMTR-MCNC: 81 MG/DL (ref 70–130)
GLUCOSE SERPL-MCNC: 70 MG/DL (ref 65–99)
HCT VFR BLD AUTO: 31.6 % (ref 34–46.6)
HGB BLD-MCNC: 9.9 G/DL (ref 12–15.9)
IMM GRANULOCYTES # BLD AUTO: 0.01 10*3/MM3 (ref 0–0.05)
IMM GRANULOCYTES NFR BLD AUTO: 0.2 % (ref 0–0.5)
LYMPHOCYTES # BLD AUTO: 1.3 10*3/MM3 (ref 0.7–3.1)
LYMPHOCYTES NFR BLD AUTO: 29.6 % (ref 19.6–45.3)
MCH RBC QN AUTO: 30.7 PG (ref 26.6–33)
MCHC RBC AUTO-ENTMCNC: 31.3 G/DL (ref 31.5–35.7)
MCV RBC AUTO: 98.1 FL (ref 79–97)
MONOCYTES # BLD AUTO: 0.42 10*3/MM3 (ref 0.1–0.9)
MONOCYTES NFR BLD AUTO: 9.6 % (ref 5–12)
NEUTROPHILS NFR BLD AUTO: 2.45 10*3/MM3 (ref 1.7–7)
NEUTROPHILS NFR BLD AUTO: 55.8 % (ref 42.7–76)
NRBC BLD AUTO-RTO: 0 /100 WBC (ref 0–0.2)
PLATELET # BLD AUTO: 178 10*3/MM3 (ref 140–450)
PMV BLD AUTO: 11.8 FL (ref 6–12)
POTASSIUM SERPL-SCNC: 3.4 MMOL/L (ref 3.5–5.2)
RBC # BLD AUTO: 3.22 10*6/MM3 (ref 3.77–5.28)
SODIUM SERPL-SCNC: 144 MMOL/L (ref 136–145)
WBC NRBC COR # BLD AUTO: 4.39 10*3/MM3 (ref 3.4–10.8)

## 2024-02-01 PROCEDURE — 80048 BASIC METABOLIC PNL TOTAL CA: CPT | Performed by: HOSPITALIST

## 2024-02-01 PROCEDURE — 82948 REAGENT STRIP/BLOOD GLUCOSE: CPT

## 2024-02-01 PROCEDURE — 63710000001 INSULIN LISPRO (HUMAN) PER 5 UNITS: Performed by: INTERNAL MEDICINE

## 2024-02-01 PROCEDURE — 85025 COMPLETE CBC W/AUTO DIFF WBC: CPT | Performed by: HOSPITALIST

## 2024-02-01 RX ORDER — POTASSIUM CHLORIDE 750 MG/1
10 TABLET, FILM COATED, EXTENDED RELEASE ORAL DAILY
Qty: 30 TABLET | Refills: 0 | Status: SHIPPED | OUTPATIENT
Start: 2024-02-01

## 2024-02-01 RX ORDER — ASPIRIN 81 MG/1
81 TABLET, CHEWABLE ORAL DAILY
Qty: 30 TABLET | Refills: 5 | Status: SHIPPED | OUTPATIENT
Start: 2024-02-02

## 2024-02-01 RX ORDER — POTASSIUM CHLORIDE 750 MG/1
10 TABLET, FILM COATED, EXTENDED RELEASE ORAL DAILY
Status: DISCONTINUED | OUTPATIENT
Start: 2024-02-01 | End: 2024-02-01 | Stop reason: HOSPADM

## 2024-02-01 RX ORDER — POTASSIUM CHLORIDE 750 MG/1
20 TABLET, FILM COATED, EXTENDED RELEASE ORAL DAILY
Status: DISCONTINUED | OUTPATIENT
Start: 2024-02-01 | End: 2024-02-01 | Stop reason: HOSPADM

## 2024-02-01 RX ADMIN — AMLODIPINE BESYLATE 5 MG: 5 TABLET ORAL at 08:30

## 2024-02-01 RX ADMIN — BISOPROLOL FUMARATE 2.5 MG: 5 TABLET, FILM COATED ORAL at 08:31

## 2024-02-01 RX ADMIN — POTASSIUM CHLORIDE 10 MEQ: 750 TABLET, EXTENDED RELEASE ORAL at 14:30

## 2024-02-01 RX ADMIN — FAMOTIDINE 20 MG: 20 TABLET, FILM COATED ORAL at 08:30

## 2024-02-01 RX ADMIN — INSULIN LISPRO 4 UNITS: 100 INJECTION, SOLUTION INTRAVENOUS; SUBCUTANEOUS at 12:12

## 2024-02-01 RX ADMIN — ASPIRIN 81 MG: 81 TABLET, CHEWABLE ORAL at 08:30

## 2024-02-01 RX ADMIN — SODIUM BICARBONATE: 84 INJECTION, SOLUTION INTRAVENOUS at 08:30

## 2024-02-01 NOTE — DISCHARGE PLACEMENT REQUEST
"Dipti Atkinson (78 y.o. Female)       Date of Birth   1945    Social Security Number       Address   [address] [place], [place] [address]    Home Phone   [phone]    MRN   [id number]       [facility]    Marital Status                               Admission Date   1/30/24    Admission Type   Urgent    Admitting Provider   Kath Smith MD    Attending Provider   Kath Smith MD    Department, Room/Bed   [address], S415/1       Discharge Date       Discharge Disposition       Discharge Destination                                 Attending Provider: Kath Smith MD    Allergies: Codeine    Isolation: None   Infection: None   Code Status: CPR    Ht: 157.5 cm (62\")   Wt: 59.1 kg (130 lb 3.2 oz)    Admission Cmt: None   Principal Problem: Renal failure [N19]                   Active Insurance as of 1/30/2024       Primary Coverage       Payor Plan Insurance Group Employer/Plan Group    MEDICARE MEDICARE A & B        Payor Plan Address Payor Plan Phone Number Payor Plan Fax Number Effective Dates    [address] [phone]  3/1/2009 - None Entered    [facility] [id number]         Subscriber Name Subscriber Birth Date Member ID       DIPTI ATKINSON 1945 [id number]               Secondary Coverage       Payor Plan Insurance Group Employer/Plan Group    [facility] SUPP [id number]       Payor Plan Address Payor Plan Phone Number Payor Plan Fax Number Effective Dates    [address]   12/1/2016 - None Entered    [facility] [id number]         Subscriber Name Subscriber Birth Date Member ID       DIPTI ATKINSON 1945 [id number]                     Emergency Contacts        (Rel.) Home Phone Work Phone Mobile Phone    ERICH BENÍTEZ (Daughter) [phone] -- --                "

## 2024-02-01 NOTE — PROGRESS NOTES
"Daily progress note    Referring physician  Dr. WALKER    Subjective  Doing better with no new complaint and wants to go home.  Patient cardiac catheterization showed no significant coronary artery disease.  Patient denies any chest pain shortness of breath palpitation and family at bedside.    History of present illness  78-year-old white female with history of coronary artery disease diabetes mellitus hypertension CVA and chronic kidney disease admitted to cardiology service for cardiac catheterization and I am asked to follow patient for medical problem.  At the time of examination she has no specific complaint but she does have exertional shortness of breath.  Patient denies any chest pain palpitation leg swelling.  Patient has no fever chills cough congestion night sweats weight loss or weight gain.    Review of Systems  Unremarkable    Physical Exam  Blood pressure 148/72, pulse 59, temperature 97.9 °F (36.6 °C), temperature source Oral, resp. rate 18, height 157.5 cm (62\"), weight 59.1 kg (130 lb 3.2 oz), SpO2 94%.    General appearance: Awake and alert in no distress   HEENT: Unremarkable  NECK   Supple, no thyromegaly or lymphadenopathy   Lungs: Normal effort and moving air bilaterally  CV: S1-S2 regular, no murmurs, no rub, no gallop   Abdomen: Soft, nontender; nondistended bowel sounds positive  Extremities: No deformity , normal color , no peripheral edema   Skin: Normal temperature, turgor and texture; no rash, ulcers  Psych: Appropriate affect, alert and oriented to person, place and time      LABS  Lab Results (last 24 hours)       Procedure Component Value Units Date/Time    POC Glucose Once [[id number]]  (Abnormal) Collected: 02/01/24 1115    Specimen: Blood Updated: 02/01/24 1116     Glucose 251 mg/dL     POC Glucose Once [[id number]]  (Normal) Collected: 02/01/24 0610    Specimen: Blood Updated: 02/01/24 0611     Glucose 81 mg/dL     Basic Metabolic Panel [[id number]]  (Abnormal) Collected: 02/01/24 0434 "    Specimen: Blood Updated: 02/01/24 0602     Glucose 70 mg/dL      BUN 35 mg/dL      Creatinine 1.51 mg/dL      Sodium 144 mmol/L      Potassium 3.4 mmol/L      Comment: Slight hemolysis detected by analyzer. Result may be falsely elevated.        Chloride 110 mmol/L      CO2 22.8 mmol/L      Calcium 8.6 mg/dL      BUN/Creatinine Ratio 23.2     Anion Gap 11.2 mmol/L      eGFR 35.0 mL/min/1.73     Narrative:      GFR Normal >60  Chronic Kidney Disease <60  Kidney Failure <15    The GFR formula is only valid for adults with stable renal function between ages 18 and 70.    CBC & Differential [[id number]]  (Abnormal) Collected: 02/01/24 0434    Specimen: Blood Updated: 02/01/24 0554    Narrative:      The following orders were created for panel order CBC & Differential.  Procedure                               Abnormality         Status                     ---------                               -----------         ------                     CBC Auto Differential[[id number]]        Abnormal            Final result                 Please view results for these tests on the individual orders.    CBC Auto Differential [[id number]]  (Abnormal) Collected: 02/01/24 0434    Specimen: Blood Updated: 02/01/24 0554     WBC 4.39 10*3/mm3      RBC 3.22 10*6/mm3      Hemoglobin 9.9 g/dL      Hematocrit 31.6 %      MCV 98.1 fL      MCH 30.7 pg      MCHC 31.3 g/dL      RDW 13.5 %      RDW-SD 48.5 fl      MPV 11.8 fL      Platelets 178 10*3/mm3      Neutrophil % 55.8 %      Lymphocyte % 29.6 %      Monocyte % 9.6 %      Eosinophil % 4.1 %      Basophil % 0.7 %      Immature Grans % 0.2 %      Neutrophils, Absolute 2.45 10*3/mm3      Lymphocytes, Absolute 1.30 10*3/mm3      Monocytes, Absolute 0.42 10*3/mm3      Eosinophils, Absolute 0.18 10*3/mm3      Basophils, Absolute 0.03 10*3/mm3      Immature Grans, Absolute 0.01 10*3/mm3      nRBC 0.0 /100 WBC     POC Glucose Once [[id number]]  (Abnormal) Collected: 01/31/24 2019    Specimen: Blood  Updated: 01/31/24 2020     Glucose 289 mg/dL     POC Glucose Once [[id number]]  (Normal) Collected: 01/31/24 1643    Specimen: Blood Updated: 01/31/24 1645     Glucose 107 mg/dL     POC Glucose Once [[id number]]  (Normal) Collected: 01/31/24 1458    Specimen: Blood Updated: 01/31/24 1502     Glucose 79 mg/dL     POC Panel 9, ISTAT [[id number]]  (Abnormal) Collected: 01/31/24 1343    Specimen: Blood Updated: 01/31/24 1437     Hemoglobin 9.5 g/dL      Hematocrit 28 %      O2 Saturation, Arterial 62 %      Comment: Serial Number: [id number]:  [id number]             Imaging Results (Last 24 Hours)       ** No results found for the last 24 hours. **          Cardiac catheterization results noted and discussed with patient and family    Current Facility-Administered Medications:     acetaminophen (TYLENOL) tablet 650 mg, 650 mg, Oral, Q4H PRN, Kath Smith MD    amLODIPine (NORVASC) tablet 5 mg, 5 mg, Oral, Daily, Kath Smtih MD, 5 mg at 02/01/24 0830    aspirin chewable tablet 81 mg, 81 mg, Oral, Daily, Kath Smith MD, 81 mg at 02/01/24 0830    atorvastatin (LIPITOR) tablet 40 mg, 40 mg, Oral, Nightly, Kath Smith MD, 40 mg at 01/31/24 2036    bisoprolol (ZEBeta) tablet 2.5 mg, 2.5 mg, Oral, Q24H, Carlton Burks MD, 2.5 mg at 02/01/24 0831    dextrose (D50W) (25 g/50 mL) IV injection 25 g, 25 g, Intravenous, Q15 Min PRN, Kath Smith MD    dextrose (GLUTOSE) oral gel 15 g, 15 g, Oral, Q15 Min PRN, Kath Smith MD    famotidine (PEPCID) tablet 20 mg, 20 mg, Oral, BID, Kath Smith MD, 20 mg at 02/01/24 0830    glucagon (GLUCAGEN) injection 1 mg, 1 mg, Intramuscular, Q15 Min PRN, Kath Smith MD    insulin lispro (HUMALOG/ADMELOG) injection 2-7 Units, 2-7 Units, Subcutaneous, 4x Daily AC & at Bedtime, Kath Smith MD, 4 Units at 02/01/24 1212    nitroglycerin (NITROSTAT) SL tablet 0.4 mg, 0.4 mg, Sublingual, Q5 Min PRN, Luis,  MD Kath    sodium chloride 0.45 % 925 mL with sodium bicarbonate 8.4 % 75 mEq infusion, , Intravenous, Continuous, Kath Smith MD, Last Rate: 75 mL/hr at 02/01/24 0830, New Bag at 02/01/24 0830    sodium chloride 0.9 % flush 10 mL, 10 mL, Intravenous, PRN, Kath Smith MD    sodium chloride 0.9 % infusion 40 mL, 40 mL, Intravenous, PRN, Kath Smith MD     ASSESSMENT  Moderate coronary artery disease medical management  Diabetes mellitus  Hypertension  Hyperlipidemia  Chronic anemia  Chronic kidney disease stage III    PLAN  CPM  Medically stable  Accu-Chek with low-dose sliding scale insulin  Adjust home medications  Stress ulcer DVT prophylaxis  Supportive care  Discussed with family nursing staff  Okay to discharge    CASIMIRO AGUIAR MD    Copied text in this note has been reviewed and is accurate as of 02/01/24

## 2024-02-01 NOTE — DISCHARGE SUMMARY
[facility]  Physician Discharge Summary    Patient Identification:  Name: Michelle Atkinson  Age: 78 y.o.  Sex: female  :  1945  MRN: [id number]    Admit date: 2024    Discharge date and time: 24      Admitting Physician: Kath Smith MD     Discharge Physician: STARR Melendez      Discharge Diagnoses:   Active Hospital Problems    Diagnosis     **Renal failure     Precordial pain     SOB (shortness of breath)     Coronary artery disease of native artery of native heart with stable angina pectoris        Discharged Condition: stable    Hospital Course:     This is a 78-year-old female who presents to [facility] for renal clearance prior to cardiac cath. See H and P for full details. Cardiac cath with normal pressures, early atherosclerotic plaque otherwise normal coronary arteries. EF 50 %. Ok to restart Eliquis tomorrow night. All providers are ok with discharge. Lasix discontinued however low dose K+ at discharge. She will do a lab on Monday. Discharge instructions as below.      Consults:   IP CONSULT TO NEPHROLOGY  IP CONSULT TO INTERNAL MEDICINE    Discharge Exam:  General: No acute distress, resting in bed   Skin: Warm and dry, no diaphoresis noted. Site shows no s/s of infection or hematoma   EYES: Eom normal and no conjunctival drainage   HEENT: external ear and nose normal; oral mucosa moist   Neck: Supple; no carotid bruits; no JVD   Heart: S1S2 regular rate and rhythm; no murmurs; no gallop or rub appreciated   Pulmonary: Respirations regular, unlabored at rest, bilateral breath sounds have good air entry throughout all lung fields; no crackles, rubs or wheezes auscultated.     GI: Soft, non-tender, non-distended, positive bowel sounds  No hepatosplenomegaly   Extremities: Bilateral lower extremities have no pre-tibial pitting edema; DP/PT pulses are palpable   Neurological: Alert and oriented x 3; no neuro deficits           LABS:  Results from last 7 days   Lab Units 01/30/24  1232   CK TOTAL U/L 47         Results from last 7 days   Lab Units 02/01/24  0434   SODIUM mmol/L 144   POTASSIUM mmol/L 3.4*   BUN mg/dL 35*   CREATININE mg/dL 1.51*   CALCIUM mg/dL 8.6     @LABRCNTbnp@  Results from last 7 days   Lab Units 02/01/24  0434 01/31/24  1343 01/31/24  0442 01/30/24  1232   WBC 10*3/mm3 4.39  --  4.45 5.28   HEMOGLOBIN g/dL 9.9*  --  10.0* 10.8*   HEMOGLOBIN, POC g/dL  --  9.5*  --   --    HEMATOCRIT % 31.6*  --  31.5* 35.2   HEMATOCRIT POC %  --  28*  --   --    PLATELETS 10*3/mm3 178  --  167 207         Results from last 7 days   Lab Units 01/31/24  0442   CHOLESTEROL mg/dL 192   TRIGLYCERIDES mg/dL 200*   HDL CHOL mg/dL 46   LDL CHOL mg/dL 111*     Disposition:  Home    Discharge Medications:      Discharge Medications        New Medications        Instructions Start Date   aspirin 81 MG chewable tablet   81 mg, Oral, Daily   Start Date: February 2, 2024     potassium chloride 10 MEQ CR tablet   10 mEq, Oral, Daily             Continue These Medications        Instructions Start Date   alendronate 70 MG tablet  Commonly known as: FOSAMAX       amLODIPine 5 MG tablet  Commonly known as: NORVASC   1 tablet, Oral, Daily      apixaban 5 MG tablet tablet  Commonly known as: ELIQUIS   5 mg, Oral, Every 12 Hours Scheduled      atorvastatin 40 MG tablet  Commonly known as: LIPITOR   Every Night.      bisoprolol-hydrochlorothiazide 5-6.25 MG per tablet  Commonly known as: ZIAC   1 tablet, Oral, Daily      Farxiga 10 MG tablet  Generic drug: dapagliflozin Propanediol   take 1 tablet (10 mg) by mouth once daily in the morning      glipizide 10 MG 24 hr tablet  Commonly known as: GLUCOTROL XL   2 (Two) Times a Day.      MYRBETRIQ PO   Oral      omeprazole 20 MG capsule  Commonly known as: priLOSEC       pioglitazone 30 MG tablet  Commonly known as: ACTOS       thiamine 100 MG tablet  tablet  Commonly known as: VITAMIN B-1   100 mg,  "Oral, Daily             Stop These Medications      furosemide 20 MG tablet  Commonly known as: LASIX                Discharge Home Instructions:  1. Follow up with your pcp in one week. Please call for an appointment.   2.  Follow-up with your primary care physician in 1 week.  Please call for an appointment.  3.  Keep appointment with nephrology.   4. Next dose of Eliquis can be taken tomorrow night.Take all medications as prescribed.  4. The importance of taking dual antiplatelets for one year  has been explained, risk of stent thrombosis leading to the acute MI, which carries high morbidity and mortality has been explained. Discontinuation or interruptions of these medications should be under the strict guidance of appropriate health professional.    5. Routine post cardiac catheterization/PCI discharge home care instructions:    1. No submerging procedure site below water for 7-10 days.  2. No lifting objects greater than 1 lbs for 3 days.  3. If groin site used, avoid climbing several flights of stairs or sitting for longer than 2 hours at a time for the next 24 hours.   4. Monitor puncture site for bleeding and/or knots;. If bleeding should occur at the groin site: lie flat, apply pressure and return to the ER. If bleeding should occur at the wrist site, apply pressure and return to the ER.  5.  You may apply a DRY Band-Aid over the puncture site if needed. Do not apply any lotions, salves or ointments to site.  6. No driving for 3 days.  7. Return to ER for recurrent symptoms.  8. No smoking.  9. Take all medications as prescribed.    Signed:  STARR Melendez  2/1/2024  13:55 EST      EMR Dragon/Transcription:   \"Dictated utilizing Dragon dictation\".     "

## 2024-02-01 NOTE — PROGRESS NOTES
PROGRESS NOTE      Patient Name: Michelle Atkinson  : 1945  MRN: [id number]  Primary Care Physician: Samy Leon Jr., MD  Date of admission: 2024    Patient Care Team:  Samy Leon Jr., MD as PCP - General (Family Medicine)        Subjective   Subjective:     Patient seen and examined, awake, no new issues, no distress, s/p L/RHC  Review of systems:  No complaints. Denies SOA, chest pain, nausea, vomiting      Allergies:    Allergies   Allergen Reactions    Codeine Nausea And Vomiting       Objective   Exam:     Vital Signs  Temp:  [97.7 °F (36.5 °C)-99.5 °F (37.5 °C)] 97.9 °F (36.6 °C)  Heart Rate:  [52-63] 59  Resp:  [15-21] 18  BP: (119-169)/(45-78) 148/72  SpO2:  [94 %-100 %] 94 %  on   ;   Device (Oxygen Therapy): room air  Body mass index is 23.81 kg/m².    General: 69 yo female in no acute distress.    Head:      Normocephalic and atraumatic.    Eyes:      PERRL/EOM intact, conjunctiva and sclera clear with out nystagmus.    Neck:      No masses, thyromegaly,  trachea central with normal respiratory effort   Lungs:    Clear bilaterally to auscultation.    Heart:      Regular rate and rhythm, no murmur no gallop  Abd:        Soft, nontender, not distended, bowel sounds positive, no shifting dullness   Pulses:   Pulses palpable  Extr:        No cyanosis or clubbing--no edema.    Neuro:    No focal deficits.   alert oriented x3  Skin:       Intact without lesions or rashes.    Psych:    Alert and cooperative; normal mood and affect; .      Results Review:  I have personally reviewed most recent Data :  CBC    Results from last 7 days   Lab Units 24  0434 24  1343 24  0442 24  1232   WBC 10*3/mm3 4.39  --  4.45 5.28   HEMOGLOBIN g/dL 9.9*  --  10.0* 10.8*   HEMOGLOBIN, POC g/dL  --  9.5*  --   --    PLATELETS 10*3/mm3 178  --  167 207     CMP   Results from last 7 days   Lab Units 24  0434 24  0442 24  1232   SODIUM mmol/L 144 141 137    POTASSIUM mmol/L 3.4* 3.2* 4.5   CHLORIDE mmol/L 110* 109* 103   CO2 mmol/L 22.8 20.0* 19.0*   BUN mg/dL 35* 44* 55*   CREATININE mg/dL 1.51* 1.65* 1.97*   GLUCOSE mg/dL 70 65 192*   ALBUMIN g/dL  --  3.3* 3.7   BILIRUBIN mg/dL  --  0.5 0.6   ALK PHOS U/L  --  46 54   AST (SGOT) U/L  --  22 24   ALT (SGPT) U/L  --  11 12     ABG      US Renal Bilateral    Result Date: 1/30/2024  1. Echogenic kidneys, compatible with renal parenchymal disease, without hydronephrosis seen  2. Simple appearing renal cyst, not requiring follow-up.  This report was finalized on 1/30/2024 5:19 PM by Dr. Prashant Baker M.D on Workstation: [id number]      XR Chest 1 View    Result Date: 1/30/2024  1. Mild cardiomegaly. 2. Underinflation of the lungs with some patchy increased density in the right infrahilar region as described. 3. Follow-up films with improved inspiration may be helpful.   This report was finalized on 1/30/2024 4:16 PM by Dr. Cosmo Tomlinson M.D on Workstation: [id number]       Results for orders placed in visit on 10/09/23    Adult Transthoracic Echo Complete W/ Cont if Necessary Per Protocol    Interpretation Summary    Left ventricular ejection fraction appears to be 46 - 50%.    Left ventricular diastolic function was normal.    The left atrial cavity is mildly dilated.    Left atrial volume is mildly increased.    The right atrial cavity is mildly  dilated.    Estimated right ventricular systolic pressure from tricuspid regurgitation is normal (<35 mmHg).    Scheduled Meds:amLODIPine, 5 mg, Oral, Daily  aspirin, 81 mg, Oral, Daily  atorvastatin, 40 mg, Oral, Nightly  bisoprolol, 2.5 mg, Oral, Q24H  famotidine, 20 mg, Oral, BID  insulin lispro, 2-7 Units, Subcutaneous, 4x Daily AC & at Bedtime      Continuous Infusions:sodium chloride 0.45 % 925 mL with sodium bicarbonate 8.4 % 75 mEq infusion, , Last Rate: 75 mL/hr at 02/01/24 0830      PRN Meds:  acetaminophen    dextrose    dextrose    glucagon (human  recombinant)    nitroglycerin    sodium chloride    sodium chloride    Assessment & Plan   Assessment and Plan:         Renal failure    Coronary artery disease of native artery of native heart with stable angina pectoris    Precordial pain    SOB (shortness of breath)    ASSESSMENT:  HAYLEE likely possible prerenal, now needing right and left heart cath; on CKD III etiology likely vascular disease and diabetes with only prior sCr on file 1.22 November 2023; CKD III etiology likely diabetic and hypertensive nephropathy  Mild NAGMA  CAD s/p stents in 2002 and 2007; Last echocardiogram on 11/2/23 with EF 46-50%, LV diastolic function normal, left atrial cavity mildly dilated, left atrial volume mildly increased, right atrial cavity mildly dilated, RVSP <35 mmHg.   HTN on amlodipine, lasix, bisoprolol-HCTZ  Hx stroke  DM2 on farxiga  Hx lung cancer  Afib  Anemia        PLAN :      HAYLEE likely prerenal was on diuretics, with sCr 1.9, last sCr on 1/23/24 was 2.05; baseline looks to be ~1.22 as of November 2023; CKD III  Renal function improved/stable s/p contrast exposure with LHC  Patient has mild metabolic acidosis  which is better with ivf with bicarb  Will stop bicarb based Ivfs given improvement in acidosis and stable renal function  S/p L/RHC yesterday. PCW 8. Ef 50%, total amount of contrast used 28cc  Renal us echgogenic kidneys , right bi8dgze atrophic 8.5 cm, left 9.5 cm  Bilateral; renal cysts  Urine completely bland, UPCR 132 mg  Supplement potassium  Hemodynamically stable  Okay to discharge from a renal standpoint. Recommend outpatient follow up in the CKD clinic in 2-3 weeks    Thank you for allowing us to participate          Electronically signed by STARR Muniz,   [facility] kidney consultant  [phone]  2/1/2024  13:13 EST

## 2024-02-01 NOTE — CASE MANAGEMENT/SOCIAL WORK
Continued Stay Note  [facility]     Patient Name: Michelle Atkinson  MRN: [id number]  Today's Date: 2/1/2024    Admit Date: 1/30/2024    Plan: Home with [facility]    Discharge Plan       Row Name 02/01/24 1425       Plan    Plan Home with [facility]    Patient/Family in Agreement with Plan yes    Plan Comments [facility] notified CCP that pt is current with their service. DC plan is home with [facility] . Gladis CAMPOS/CCP    Final Discharge Disposition Code 06 - home with home health care    Final Note Home with [facility] . No additional CCP needs.                   Discharge Codes    No documentation.                 Expected Discharge Date and Time       Expected Discharge Date Expected Discharge Time    Feb 1, 2024               Dora Cabrera RN

## 2024-02-01 NOTE — CASE MANAGEMENT/SOCIAL WORK
Case Management Discharge Note      Final Note: Home with [facility]. No additional CCP needs.         Selected Continued Care - Admitted Since 1/30/2024       Destination    No services have been selected for the patient.                Durable Medical Equipment    No services have been selected for the patient.                Dialysis/Infusion    No services have been selected for the patient.                Home Medical Care       Service Provider Selected Services Address Phone Fax Patient Preferred    [facility] ,  Home Health Services ,  Home Rehabilitation [address] [address] [phone] --              Therapy    No services have been selected for the patient.                Community Resources    No services have been selected for the patient.                Community & DME    No services have been selected for the patient.                         Final Discharge Disposition Code: 06 - home with home health care

## 2024-02-02 NOTE — OUTREACH NOTE
Prep Survey      Flowsheet Row Responses   [facility] patient discharged from? [place]   Is LACE score < 7 ? No   Eligibility Readm Mgmt   Discharge diagnosis Renal failure   Does the patient have one of the following disease processes/diagnoses(primary or secondary)? Other   Does the patient have Home health ordered? Yes   What is the Home health agency?  [facility].   Is there a DME ordered? No   Prep survey completed? Yes            STEVEN HEARD - Registered Nurse

## 2024-02-05 ENCOUNTER — LAB (OUTPATIENT)
Dept: LAB | Facility: HOSPITAL | Age: 79
End: 2024-02-05
Payer: MEDICARE

## 2024-02-05 DIAGNOSIS — I25.118 CORONARY ARTERY DISEASE OF NATIVE ARTERY OF NATIVE HEART WITH STABLE ANGINA PECTORIS: ICD-10-CM

## 2024-02-05 DIAGNOSIS — I48.0 PAROXYSMAL ATRIAL FIBRILLATION: ICD-10-CM

## 2024-02-05 LAB
ALBUMIN SERPL-MCNC: 3.4 G/DL (ref 3.5–5.2)
ALBUMIN/GLOB SERPL: 1.4 G/DL
ALP SERPL-CCNC: 52 U/L (ref 39–117)
ALT SERPL W P-5'-P-CCNC: 12 U/L (ref 1–33)
ANION GAP SERPL CALCULATED.3IONS-SCNC: 11 MMOL/L (ref 5–15)
AST SERPL-CCNC: 20 U/L (ref 1–32)
BILIRUB SERPL-MCNC: 0.5 MG/DL (ref 0–1.2)
BUN SERPL-MCNC: 30 MG/DL (ref 8–23)
BUN/CREAT SERPL: 18.4 (ref 7–25)
CALCIUM SPEC-SCNC: 9.4 MG/DL (ref 8.6–10.5)
CHLORIDE SERPL-SCNC: 111 MMOL/L (ref 98–107)
CO2 SERPL-SCNC: 21 MMOL/L (ref 22–29)
CREAT SERPL-MCNC: 1.63 MG/DL (ref 0.57–1)
EGFRCR SERPLBLD CKD-EPI 2021: 32 ML/MIN/1.73
GLOBULIN UR ELPH-MCNC: 2.4 GM/DL
GLUCOSE SERPL-MCNC: 228 MG/DL (ref 65–99)
POTASSIUM SERPL-SCNC: 4.1 MMOL/L (ref 3.5–5.2)
PROT SERPL-MCNC: 5.8 G/DL (ref 6–8.5)
SODIUM SERPL-SCNC: 143 MMOL/L (ref 136–145)

## 2024-02-05 PROCEDURE — 80053 COMPREHEN METABOLIC PANEL: CPT

## 2024-02-05 PROCEDURE — 36415 COLL VENOUS BLD VENIPUNCTURE: CPT

## 2024-02-06 ENCOUNTER — READMISSION MANAGEMENT (OUTPATIENT)
Dept: CALL CENTER | Facility: HOSPITAL | Age: 79
End: 2024-02-06
Payer: MEDICARE

## 2024-02-06 NOTE — OUTREACH NOTE
Medical Week 1 Survey      Flowsheet Row Responses   [facility] patient discharged from? [place]   Does the patient have one of the following disease processes/diagnoses(primary or secondary)? Other   Week 1 attempt successful? No   Unsuccessful attempts Attempt 1  [All numbers listed were attempted-no answer]            Trang H - Registered Nurse

## 2024-02-09 ENCOUNTER — READMISSION MANAGEMENT (OUTPATIENT)
Dept: CALL CENTER | Facility: HOSPITAL | Age: 79
End: 2024-02-09
Payer: MEDICARE

## 2024-02-09 NOTE — OUTREACH NOTE
Medical Week 1 Survey      Flowsheet Row Responses   [facility] patient discharged from? [place]   Does the patient have one of the following disease processes/diagnoses(primary or secondary)? Other   Week 1 attempt successful? Yes   Call start time 1714   Call end time 1716   Discharge diagnosis Renal failure   Meds reviewed with patient/caregiver? Yes   Is the patient having any side effects they believe may be caused by any medication additions or changes? No   Does the patient have all medications ordered at discharge? Yes   Is the patient taking all medications as directed (includes completed medication regime)? Yes   Does the patient have a primary care provider?  Yes   Does the patient have an appointment with their PCP within 7 days of discharge? No   What is preventing the patient from scheduling follow up appointments within 7 days of discharge? Haven't had time   Nursing Interventions Advised patient to make appointment   What is the Home health agency?  [facility].   Has home health visited the patient within 72 hours of discharge? Yes   Psychosocial issues? No   Did the patient receive a copy of their discharge instructions? Yes   Nursing interventions Reviewed instructions with patient   What is the patient's perception of their health status since discharge? Improving   Is the patient/caregiver able to teach back signs and symptoms related to disease process for when to call PCP? Yes   Is the patient/caregiver able to teach back signs and symptoms related to disease process for when to call 911? Yes   Is the patient/caregiver able to teach back the hierarchy of who to call/visit for symptoms/problems? PCP, Specialist, Home health nurse, Urgent Care, ED, 911 Yes   If the patient is a current smoker, are they able to teach back resources for cessation? Not a smoker   Additional teach back comments starting to gain back weight, appetite coming back   Week 1 call completed? Yes   Call end time  1716            Ysabel KUMAR - Registered Nurse

## 2024-02-13 ENCOUNTER — OFFICE VISIT (OUTPATIENT)
Dept: CARDIOLOGY | Facility: CLINIC | Age: 79
End: 2024-02-13
Payer: MEDICARE

## 2024-02-13 VITALS
WEIGHT: 132 LBS | DIASTOLIC BLOOD PRESSURE: 67 MMHG | HEART RATE: 67 BPM | BODY MASS INDEX: 24.29 KG/M2 | SYSTOLIC BLOOD PRESSURE: 120 MMHG | HEIGHT: 62 IN

## 2024-02-13 DIAGNOSIS — I48.0 PAROXYSMAL ATRIAL FIBRILLATION: ICD-10-CM

## 2024-02-13 DIAGNOSIS — I25.118 CORONARY ARTERY DISEASE OF NATIVE ARTERY OF NATIVE HEART WITH STABLE ANGINA PECTORIS: Primary | ICD-10-CM

## 2024-02-13 DIAGNOSIS — I10 PRIMARY HYPERTENSION: ICD-10-CM

## 2024-02-13 DIAGNOSIS — N18.30 STAGE 3 CHRONIC KIDNEY DISEASE, UNSPECIFIED WHETHER STAGE 3A OR 3B CKD: ICD-10-CM

## 2024-02-13 DIAGNOSIS — Z95.818 STATUS POST PLACEMENT OF IMPLANTABLE LOOP RECORDER: ICD-10-CM

## 2024-02-13 PROCEDURE — 99214 OFFICE O/P EST MOD 30 MIN: CPT

## 2024-02-13 PROCEDURE — 3074F SYST BP LT 130 MM HG: CPT

## 2024-02-13 PROCEDURE — 3078F DIAST BP <80 MM HG: CPT

## 2024-02-13 NOTE — PROGRESS NOTES
Subjective:        Michelle Atkinson is a 78 y.o. female who here for follow up    Chief Complaint   Patient presents with    Hospital Follow Up Visit     CATH       HPI    This is a 78 year old female with paroxysmal atrial fibrillation, coronary artery disease with history of stent placed 2002 and 2007, hypertension, diabetes mellitus, hx of CVA. She follows up in office today after hospital admission.   Admitted 1/30/2024 through 2/1/2024 for renal clearance prior to cath. She underwent   Cardiac catheterization 1/31/24 with normal right-sided heart pressures, early atherosclerotic plaque, Otherwise normal coronaries.    Reviewed and still relevant: Echo 11/2/2023 EF 46 to 50%, normal LV function.  LAC mildly dilated, L a volume mildly increased.  RA C mildly dilated.  Stress test 11/2/2023 myocardial perfusion imaging indicates a normal study with no evidence of ischemia.    The following portions of the patient's history were reviewed and updated as appropriate: allergies, current medications, past family history, past medical history, past social history, past surgical history and problem list.    Past Medical History:   Diagnosis Date    CAD (coronary artery disease)     Essential (primary) hypertension     Stroke     Type 2 diabetes mellitus          reports that she has quit smoking. Her smoking use included cigarettes. She has never used smokeless tobacco. She reports that she does not drink alcohol and does not use drugs.     Family History   Problem Relation Age of Onset    Heart disease Mother     Heart disease Father     Heart disease Brother        ROS     Review of Systems  Constitutional: no wt loss, fever, fatigue  Gastrointestinal: no nausea, abdominal pain  Behavioral/Psych: no insomnia or anxiety  Cardiovascular no chest pain or shortness of breath      Objective:           Vitals and nursing note reviewed.   Constitutional:       Appearance: Well-developed.   HENT:      Head: Normocephalic.       Right Ear: External ear normal.      Left Ear: External ear normal.   Neck:      Vascular: No JVD.   Pulmonary:      Effort: Pulmonary effort is normal. No respiratory distress.      Breath sounds: Normal breath sounds. No stridor. No rales.   Cardiovascular:      Normal rate. Regular rhythm.      No gallop.    Pulses:     Intact distal pulses.   Edema:     Peripheral edema absent.   Abdominal:      General: Bowel sounds are normal. There is no distension.      Palpations: Abdomen is soft.      Tenderness: There is no abdominal tenderness. There is no guarding.   Musculoskeletal: Normal range of motion.         General: No tenderness.      Cervical back: Normal range of motion. Skin:     General: Skin is warm.   Neurological:      Mental Status: Alert and oriented to person, place, and time.      Deep Tendon Reflexes: Reflexes are normal and symmetric.   Psychiatric:         Judgment: Judgment normal.         Procedures    Interpretation Summary         Left ventricular ejection fraction is normal (Calculated EF = 60%).    Myocardial perfusion imaging indicates a normal myocardial perfusion study with no evidence of ischemia.    Impressions are consistent with a low risk study.    Findings consistent with a normal ECG stress test.    Interpretation Summary         Left ventricular ejection fraction appears to be 46 - 50%.    Left ventricular diastolic function was normal.    The left atrial cavity is mildly dilated.    Left atrial volume is mildly increased.    The right atrial cavity is mildly  dilated.    Estimated right ventricular systolic pressure from tricuspid regurgitation is normal (<35 mmHg).      Current Outpatient Medications:     alendronate (FOSAMAX) 70 MG tablet, , Disp: , Rfl:     amLODIPine (NORVASC) 5 MG tablet, Take 1 tablet by mouth Daily., Disp: , Rfl:     apixaban (ELIQUIS) 5 MG tablet tablet, Take 1 tablet by mouth Every 12 (Twelve) Hours., Disp: 180 tablet, Rfl: 3    aspirin 81 MG chewable  tablet, Chew 1 tablet Daily., Disp: 30 tablet, Rfl: 5    atorvastatin (LIPITOR) 40 MG tablet, Every Night., Disp: , Rfl:     bisoprolol-hydrochlorothiazide (ZIAC) 5-6.25 MG per tablet, Take 1 tablet by mouth Daily., Disp: 90 tablet, Rfl: 3    Farxiga 10 MG tablet, take 1 tablet (10 mg) by mouth once daily in the morning, Disp: , Rfl:     glipizide (GLUCOTROL XL) 10 MG 24 hr tablet, 2 (Two) Times a Day., Disp: , Rfl:     Mirabegron (MYRBETRIQ PO), Take  by mouth., Disp: , Rfl:     omeprazole (priLOSEC) 20 MG capsule, , Disp: , Rfl:     pioglitazone (ACTOS) 30 MG tablet, , Disp: , Rfl:     potassium chloride 10 MEQ CR tablet, Take 1 tablet by mouth Daily., Disp: 30 tablet, Rfl: 0    thiamine (VITAMIN B-1) 100 MG tablet  tablet, Take 1 tablet by mouth Daily. (Patient not taking: Reported on 2/13/2024), Disp: , Rfl:      Assessment:        Patient Active Problem List   Diagnosis    Abnormal EKG    Coronary artery disease of native artery of native heart with stable angina pectoris    Chronic anticoagulation    Paroxysmal atrial fibrillation    CVA (cerebrovascular accident)    Status post placement of implantable loop recorder    Primary hypertension    Precordial pain    SOB (shortness of breath)    Renal failure     CHADS-VASc Risk Assessment              5 Total Score    1 Hypertension    2 Age >/= 75    1 DM    1 Sex: Female        Criteria that do not apply:    CHF    PRIOR STROKE/TIA/THROMBO    Vascular Disease    Age 65-74                    Plan:   1.  Paroxysmal atrial fibrillation:controlled bisoprolol, ac on eliquis. No s/s bleeding    2.  Loop recorder: monitored    3.  Coronary artery disease: cath with early plaque, otherwise normal. Continue aspirin, beta blockade and statin.     Risk reduction for the coronary artery disease, controlling the blood pressure, blood sugar management, cholesterol management, exercise, stress management, and proper compliance with medications and follow-up has been  discussed    4.  Hypertension: 120/67 controlled, continue ziac    Importance of controlling hypertension and blood pressure  checkup on the regular basis has been explained. Hypertension as a silent killer has been discussed. Risk reduction of the weight and regular exercises to control the hypertension has been explained.    5.  Hyperlipidemia: she reports her pcp manages her cholesterol and labs, continue atorvastatin    Risk of the hyperlipidemia, importance of the treatment has been explained. Pros and cons of the statins has been explained. Regular blood workup as well as side effects including the liver failure, myelopathy death has been explained.    CKD: refer to nephrology       No diagnosis found.    There are no diagnoses linked to this encounter.    COUNSELING: susi Coffey was given to patient for the following topics: diagnostic results, risk factor reductions, impressions, risks and benefits of treatment options and importance of treatment compliance .       SMOKING COUNSELING: denies    Follow up in 6 month or sooner If needed  Wants referral to dr [facility] to [facility] her husbnad sees him     Sincerely,   STARR Mott  [facility]  02/13/24  13:52 EST    EMR Dragon/Transcription disclaimer:   Much of this encounter note is an electronic transcription/translation of spoken language to printed text. The electronic translation of spoken language may permit erroneous, or at times, nonsensical words or phrases to be inadvertently transcribed; Although I have reviewed the note for such errors, some may still exist.

## 2024-02-19 ENCOUNTER — READMISSION MANAGEMENT (OUTPATIENT)
Dept: CALL CENTER | Facility: HOSPITAL | Age: 79
End: 2024-02-19
Payer: MEDICARE

## 2024-02-19 NOTE — OUTREACH NOTE
Medical Week 2 Survey      Flowsheet Row Responses   [facility] patient discharged from? [place]   Does the patient have one of the following disease processes/diagnoses(primary or secondary)? Other   Week 2 attempt successful? Yes   Call start time 1247   Discharge diagnosis Renal failure   Call end time 1249   Meds reviewed with patient/caregiver? Yes   Is the patient having any side effects they believe may be caused by any medication additions or changes? No   Is the patient taking all medications as directed (includes completed medication regime)? Yes   Does the patient have a primary care provider?  Yes   Does the patient have an appointment with their PCP within 7 days of discharge? Yes   Has the patient kept scheduled appointments due by today? Yes   What is the Home health agency?  [facility].   Has home health visited the patient within 72 hours of discharge? Yes   Psychosocial issues? No   What is the patient's perception of their health status since discharge? Improving   Is the patient/caregiver able to teach back the hierarchy of who to call/visit for symptoms/problems? PCP, Specialist, Home health nurse, Urgent Care, ED, 911 Yes   Week 2 Call Completed? Yes   Graduated Yes   Is the patient interested in additional calls from an ambulatory ? No   Would this patient benefit from a Referral to Amb Social Work? No   Graduated/Revoked comments Pt reports doing well, no questions/concerns at this time   Call end time 1249            Leonela KUMAR - Registered Nurse

## 2024-02-20 RX ORDER — POTASSIUM CHLORIDE 750 MG/1
10 TABLET, FILM COATED, EXTENDED RELEASE ORAL DAILY
Qty: 90 TABLET | Refills: 2 | Status: SHIPPED | OUTPATIENT
Start: 2024-02-20

## 2024-04-08 ENCOUNTER — TELEPHONE (OUTPATIENT)
Dept: CARDIOLOGY | Facility: CLINIC | Age: 79
End: 2024-04-08
Payer: MEDICARE

## 2024-04-08 NOTE — TELEPHONE ENCOUNTER
"4/8/24 remote transmission reviewed.  Alert for pause on 4/5/24 at 0851 CDT.    I called and spoke with patient.  She thinks she may have been sleeping at this time.  She does report that some mornings she has felt weak and \"sweaty.\"  Her BP during one of these episodes was 86/48.  She states BP has been running low in the mornings but will later recover.  Patient is not sure if this after she takes her morning medications.  I asked the patient to keep a BP log so we can see the trend.      I advised providers would review the pause event on monitor.  Patient verbalized understanding.         "

## 2024-04-08 NOTE — TELEPHONE ENCOUNTER
Needs appt asap for possible pm  Scheduling notified and calling pt to schedule    Electronically signed by STARR Mott, 04/08/24, 9:50 AM EDT.

## 2024-04-08 NOTE — PROGRESS NOTES
"6.8 sec pause, has been feeling weak, low bp, waking up \"sweaty\".        Subjective:        Michelle Atkinson is a 79 y.o. female who here for follow up    CC  Dizzi, lightheaded sob  HPI  79-year-old female with sick sinus syndrome severe bradycardia pauses complains of significant dizziness and lightheadedness along with significant shortness of breath     Problems Addressed this Visit          Cardiac and Vasculature    Abnormal EKG    Relevant Orders    Case Request Cath Lab: Pacemaker DC new (Completed)    CBC & Differential (Completed)    Basic Metabolic Panel (Completed)    aPTT (Completed)    Protime-INR (Completed)    Coronary artery disease of native artery of native heart with stable angina pectoris - Primary    Relevant Orders    Case Request Cath Lab: Pacemaker DC new (Completed)    CBC & Differential (Completed)    Basic Metabolic Panel (Completed)    aPTT (Completed)    Protime-INR (Completed)    Paroxysmal atrial fibrillation    Relevant Orders    Case Request Cath Lab: Pacemaker DC new (Completed)    CBC & Differential (Completed)    Basic Metabolic Panel (Completed)    aPTT (Completed)    Protime-INR (Completed)    Hypotension    Relevant Orders    Case Request Cath Lab: Pacemaker DC new (Completed)    CBC & Differential (Completed)    Basic Metabolic Panel (Completed)    aPTT (Completed)    Protime-INR (Completed)    Sick sinus syndrome    Relevant Orders    Case Request Cath Lab: Pacemaker DC new (Completed)       Symptoms and Signs    Weak    Relevant Orders    Case Request Cath Lab: Pacemaker DC new (Completed)    CBC & Differential (Completed)    Basic Metabolic Panel (Completed)    aPTT (Completed)    Protime-INR (Completed)    Dizziness    Relevant Orders    Case Request Cath Lab: Pacemaker DC new (Completed)    CBC & Differential (Completed)    Basic Metabolic Panel (Completed)    aPTT (Completed)    Protime-INR (Completed)     Diagnoses         Codes Comments    Coronary artery disease of " "native artery of native heart with stable angina pectoris    -  Primary ICD-10-CM: I25.118  ICD-9-CM: 414.01, 413.9     Abnormal EKG     ICD-10-CM: R94.31  ICD-9-CM: 794.31     Paroxysmal atrial fibrillation     ICD-10-CM: I48.0  ICD-9-CM: 427.31     Weak     ICD-10-CM: R53.1  ICD-9-CM: 780.79     Hypotension, unspecified hypotension type     ICD-10-CM: I95.9  ICD-9-CM: 458.9     Dizziness     ICD-10-CM: R42  ICD-9-CM: 780.4     Sick sinus syndrome     ICD-10-CM: I49.5  ICD-9-CM: 427.81           .    The following portions of the patient's history were reviewed and updated as appropriate: allergies, current medications, past family history, past medical history, past social history, past surgical history and problem list.    Past Medical History:   Diagnosis Date    CAD (coronary artery disease)     Essential (primary) hypertension     Stroke     Type 2 diabetes mellitus      reports that she has quit smoking. Her smoking use included cigarettes. She has never used smokeless tobacco. She reports that she does not currently use alcohol. She reports that she does not use drugs.   Family History   Problem Relation Age of Onset    Heart disease Mother     Heart disease Father     Heart disease Brother        Review of Systems  Constitutional: No wt loss, fever, fatigue  Gastrointestinal: No nausea, abdominal pain  Behavioral/Psych: No insomnia or anxiety   Cardiovascular dizziness and lightheadedness  Objective:       Physical Exam           Physical Exam  /65 (BP Location: Left arm)   Pulse 54   Ht 157.5 cm (62\")   Wt 60.3 kg (133 lb)   BMI 24.33 kg/m²     General appearance: No acute changes   Eyes: Sclerae conjunctivae normal, pupils reactive   HENT: Atraumatic; oropharynx clear with moist mucous membranes and no mucosal ulcerations;  Neck: Trachea midline; NECK, supple, no thyromegaly or lymphadenopathy   Lungs: Normal size and shape, normal breath sounds, equal distribution of air, no rales and rhonchi "   CV: S1-S2 regular, no murmurs, no rub, no gallop   Abdomen: Soft, nontender; no masses , no abnormal abdominal sounds   Extremities: No deformity , normal color , no peripheral edema   Skin: Normal temperature, turgor and texture; no rash, ulcers  Psych: Appropriate affect, alert and oriented to person, place and time             ECG 12 Lead    Date/Time: 4/11/2024 12:24 PM  Performed by: Kath Smith MD    Authorized by: Kath Smith MD  Comparison: compared with previous ECG   Similar to previous ECG  Rhythm: sinus rhythm    Clinical impression: non-specific ECG            Echocardiogram:    Results for orders placed in visit on 10/09/23    Adult Transthoracic Echo Complete W/ Cont if Necessary Per Protocol    Interpretation Summary    Left ventricular ejection fraction appears to be 46 - 50%.    Left ventricular diastolic function was normal.    The left atrial cavity is mildly dilated.    Left atrial volume is mildly increased.    The right atrial cavity is mildly  dilated.    Estimated right ventricular systolic pressure from tricuspid regurgitation is normal (<35 mmHg).          Current Outpatient Medications:     alendronate (FOSAMAX) 70 MG tablet, , Disp: , Rfl:     amLODIPine (NORVASC) 5 MG tablet, Take 1 tablet by mouth Daily., Disp: , Rfl:     apixaban (ELIQUIS) 5 MG tablet tablet, Take 1 tablet by mouth Every 12 (Twelve) Hours., Disp: 180 tablet, Rfl: 3    aspirin 81 MG chewable tablet, Chew 1 tablet Daily., Disp: 30 tablet, Rfl: 5    atorvastatin (LIPITOR) 40 MG tablet, Every Night., Disp: , Rfl:     bisoprolol-hydrochlorothiazide (ZIAC) 5-6.25 MG per tablet, Take 1 tablet by mouth Daily., Disp: 90 tablet, Rfl: 3    Farxiga 10 MG tablet, take 1 tablet (10 mg) by mouth once daily in the morning, Disp: , Rfl:     glipizide (GLUCOTROL XL) 10 MG 24 hr tablet, 2 (Two) Times a Day., Disp: , Rfl:     Mirabegron (MYRBETRIQ PO), Take  by mouth., Disp: , Rfl:     omeprazole (priLOSEC) 20 MG  capsule, , Disp: , Rfl:     pioglitazone (ACTOS) 30 MG tablet, , Disp: , Rfl:     potassium chloride 10 MEQ CR tablet, Take 1 tablet by mouth Daily., Disp: 90 tablet, Rfl: 2    thiamine (VITAMIN B-1) 100 MG tablet  tablet, Take 1 tablet by mouth Daily., Disp: , Rfl:    Assessment:                Plan:          ICD-10-CM ICD-9-CM   1. Coronary artery disease of native artery of native heart with stable angina pectoris  I25.118 414.01     413.9   2. Abnormal EKG  R94.31 794.31   3. Paroxysmal atrial fibrillation  I48.0 427.31   4. Weak  R53.1 780.79   5. Hypotension, unspecified hypotension type  I95.9 458.9   6. Dizziness  R42 780.4   7. Sick sinus syndrome  I49.5 427.81     1. Coronary artery disease of native artery of native heart with stable angina pectoris  No chest pain  - Case Request Cath Lab: Pacemaker DC new  - CBC & Differential  - Basic Metabolic Panel  - aPTT  - Protime-INR    2. Abnormal EKG  No chest pain  - Case Request Cath Lab: Pacemaker DC new  - CBC & Differential  - Basic Metabolic Panel  - aPTT  - Protime-INR    3. Paroxysmal atrial fibrillation  Controlled  - Case Request Cath Lab: Pacemaker DC new  - CBC & Differential  - Basic Metabolic Panel  - aPTT  - Protime-INR    4. Weak    - Case Request Cath Lab: Pacemaker DC new  - CBC & Differential  - Basic Metabolic Panel  - aPTT  - Protime-INR    5. Hypotension, unspecified hypotension type    - Case Request Cath Lab: Pacemaker DC new  - CBC & Differential  - Basic Metabolic Panel  - aPTT  - Protime-INR    6. Dizziness    - Case Request Cath Lab: Pacemaker DC new  - CBC & Differential  - Basic Metabolic Panel  - aPTT  - Protime-INR    7. Sick sinus syndrome  Needs permanent pacemaker procedure risk and options have been explained  - Case Request Cath Lab: Pacemaker DC new      Dual chamber pacer, procedure, risks and complications explained    Sss    Stop blood thinners 3 days  COUNSELING:    Michelle Coffey was given to patient for  the following topics: diagnostic results, risk factor reductions, impressions, risks and benefits of treatment options and importance of treatment compliance .       SMOKING COUNSELING:        Dictated using Dragon dictation

## 2024-04-11 ENCOUNTER — OFFICE VISIT (OUTPATIENT)
Dept: CARDIOLOGY | Facility: CLINIC | Age: 79
End: 2024-04-11
Payer: MEDICARE

## 2024-04-11 VITALS
WEIGHT: 133 LBS | SYSTOLIC BLOOD PRESSURE: 124 MMHG | BODY MASS INDEX: 24.48 KG/M2 | HEART RATE: 54 BPM | DIASTOLIC BLOOD PRESSURE: 65 MMHG | HEIGHT: 62 IN

## 2024-04-11 DIAGNOSIS — R94.31 ABNORMAL EKG: ICD-10-CM

## 2024-04-11 DIAGNOSIS — R42 DIZZINESS: ICD-10-CM

## 2024-04-11 DIAGNOSIS — I49.5 SICK SINUS SYNDROME: ICD-10-CM

## 2024-04-11 DIAGNOSIS — I95.9 HYPOTENSION, UNSPECIFIED HYPOTENSION TYPE: ICD-10-CM

## 2024-04-11 DIAGNOSIS — I48.0 PAROXYSMAL ATRIAL FIBRILLATION: ICD-10-CM

## 2024-04-11 DIAGNOSIS — I25.118 CORONARY ARTERY DISEASE OF NATIVE ARTERY OF NATIVE HEART WITH STABLE ANGINA PECTORIS: Primary | ICD-10-CM

## 2024-04-11 DIAGNOSIS — R53.1 WEAK: ICD-10-CM

## 2024-04-11 PROCEDURE — 3074F SYST BP LT 130 MM HG: CPT | Performed by: INTERNAL MEDICINE

## 2024-04-11 PROCEDURE — 93000 ELECTROCARDIOGRAM COMPLETE: CPT | Performed by: INTERNAL MEDICINE

## 2024-04-11 PROCEDURE — 3078F DIAST BP <80 MM HG: CPT | Performed by: INTERNAL MEDICINE

## 2024-04-11 PROCEDURE — 99214 OFFICE O/P EST MOD 30 MIN: CPT | Performed by: INTERNAL MEDICINE

## 2024-04-17 ENCOUNTER — HOSPITAL ENCOUNTER (OUTPATIENT)
Dept: CARDIOLOGY | Facility: HOSPITAL | Age: 79
Discharge: HOME | End: 2024-04-17
Admitting: INTERNAL MEDICINE
Payer: MEDICARE

## 2024-04-17 LAB
ANION GAP SERPL CALCULATED.3IONS-SCNC: 12 MMOL/L (ref 5–15)
APTT PPP: 32 SECONDS (ref 22.7–35.4)
BASOPHILS # BLD AUTO: 0.04 10*3/MM3 (ref 0–0.2)
BASOPHILS NFR BLD AUTO: 0.8 % (ref 0–1.5)
BUN SERPL-MCNC: 57 MG/DL (ref 8–23)
BUN/CREAT SERPL: 29.4 (ref 7–25)
CALCIUM SPEC-SCNC: 8.6 MG/DL (ref 8.6–10.5)
CHLORIDE SERPL-SCNC: 108 MMOL/L (ref 98–107)
CO2 SERPL-SCNC: 20 MMOL/L (ref 22–29)
CREAT SERPL-MCNC: 1.94 MG/DL (ref 0.57–1)
DEPRECATED RDW RBC AUTO: 44.2 FL (ref 37–54)
EGFRCR SERPLBLD CKD-EPI 2021: 25.8 ML/MIN/1.73
EOSINOPHIL # BLD AUTO: 0.12 10*3/MM3 (ref 0–0.4)
EOSINOPHIL NFR BLD AUTO: 2.5 % (ref 0.3–6.2)
ERYTHROCYTE [DISTWIDTH] IN BLOOD BY AUTOMATED COUNT: 13 % (ref 12.3–15.4)
GLUCOSE SERPL-MCNC: 162 MG/DL (ref 65–99)
HCT VFR BLD AUTO: 36.4 % (ref 34–46.6)
HGB BLD-MCNC: 11.7 G/DL (ref 12–15.9)
IMM GRANULOCYTES # BLD AUTO: 0.01 10*3/MM3 (ref 0–0.05)
IMM GRANULOCYTES NFR BLD AUTO: 0.2 % (ref 0–0.5)
INR PPP: 1.28 (ref 0.9–1.1)
LYMPHOCYTES # BLD AUTO: 1.19 10*3/MM3 (ref 0.7–3.1)
LYMPHOCYTES NFR BLD AUTO: 24.5 % (ref 19.6–45.3)
MCH RBC QN AUTO: 30.2 PG (ref 26.6–33)
MCHC RBC AUTO-ENTMCNC: 32.1 G/DL (ref 31.5–35.7)
MCV RBC AUTO: 94.1 FL (ref 79–97)
MONOCYTES # BLD AUTO: 0.43 10*3/MM3 (ref 0.1–0.9)
MONOCYTES NFR BLD AUTO: 8.8 % (ref 5–12)
NEUTROPHILS NFR BLD AUTO: 3.07 10*3/MM3 (ref 1.7–7)
NEUTROPHILS NFR BLD AUTO: 63.2 % (ref 42.7–76)
NRBC BLD AUTO-RTO: 0 /100 WBC (ref 0–0.2)
PLATELET # BLD AUTO: 175 10*3/MM3 (ref 140–450)
PMV BLD AUTO: 11 FL (ref 6–12)
POTASSIUM SERPL-SCNC: 4.3 MMOL/L (ref 3.5–5.2)
PROTHROMBIN TIME: 16.2 SECONDS (ref 11.7–14.2)
RBC # BLD AUTO: 3.87 10*6/MM3 (ref 3.77–5.28)
SODIUM SERPL-SCNC: 140 MMOL/L (ref 136–145)
WBC NRBC COR # BLD AUTO: 4.86 10*3/MM3 (ref 3.4–10.8)

## 2024-04-17 PROCEDURE — 85610 PROTHROMBIN TIME: CPT | Performed by: INTERNAL MEDICINE

## 2024-04-17 PROCEDURE — 80048 BASIC METABOLIC PNL TOTAL CA: CPT | Performed by: INTERNAL MEDICINE

## 2024-04-17 PROCEDURE — 85025 COMPLETE CBC W/AUTO DIFF WBC: CPT | Performed by: INTERNAL MEDICINE

## 2024-04-17 PROCEDURE — 36415 COLL VENOUS BLD VENIPUNCTURE: CPT | Performed by: INTERNAL MEDICINE

## 2024-04-17 PROCEDURE — 85730 THROMBOPLASTIN TIME PARTIAL: CPT | Performed by: INTERNAL MEDICINE

## 2024-04-24 ENCOUNTER — HOSPITAL ENCOUNTER (OUTPATIENT)
Facility: HOSPITAL | Age: 79
Setting detail: HOSPITAL OUTPATIENT SURGERY
Discharge: HOME | End: 2024-04-24
Attending: INTERNAL MEDICINE | Admitting: INTERNAL MEDICINE
Payer: MEDICARE

## 2024-04-24 ENCOUNTER — APPOINTMENT (OUTPATIENT)
Dept: GENERAL RADIOLOGY | Facility: HOSPITAL | Age: 79
End: 2024-04-24
Payer: MEDICARE

## 2024-04-24 VITALS
SYSTOLIC BLOOD PRESSURE: 102 MMHG | DIASTOLIC BLOOD PRESSURE: 54 MMHG | OXYGEN SATURATION: 98 % | BODY MASS INDEX: 23.92 KG/M2 | HEIGHT: 62 IN | HEART RATE: 62 BPM | RESPIRATION RATE: 16 BRPM | WEIGHT: 130 LBS | TEMPERATURE: 97.2 F

## 2024-04-24 DIAGNOSIS — I49.5 SICK SINUS SYNDROME: ICD-10-CM

## 2024-04-24 DIAGNOSIS — R42 DIZZINESS: ICD-10-CM

## 2024-04-24 DIAGNOSIS — R53.1 WEAK: ICD-10-CM

## 2024-04-24 DIAGNOSIS — I95.9 HYPOTENSION, UNSPECIFIED HYPOTENSION TYPE: ICD-10-CM

## 2024-04-24 DIAGNOSIS — I48.0 PAROXYSMAL ATRIAL FIBRILLATION: ICD-10-CM

## 2024-04-24 DIAGNOSIS — I25.118 CORONARY ARTERY DISEASE OF NATIVE ARTERY OF NATIVE HEART WITH STABLE ANGINA PECTORIS: ICD-10-CM

## 2024-04-24 DIAGNOSIS — R94.31 ABNORMAL EKG: ICD-10-CM

## 2024-04-24 LAB — GLUCOSE BLDC GLUCOMTR-MCNC: 173 MG/DL (ref 70–130)

## 2024-04-24 PROCEDURE — C1785 PMKR, DUAL, RATE-RESP: HCPCS | Performed by: INTERNAL MEDICINE

## 2024-04-24 PROCEDURE — 33286 RMVL SUBQ CAR RHYTHM MNTR: CPT | Performed by: INTERNAL MEDICINE

## 2024-04-24 PROCEDURE — C1898 LEAD, PMKR, OTHER THAN TRANS: HCPCS | Performed by: INTERNAL MEDICINE

## 2024-04-24 PROCEDURE — 93005 ELECTROCARDIOGRAM TRACING: CPT | Performed by: INTERNAL MEDICINE

## 2024-04-24 PROCEDURE — 25510000001 IOPAMIDOL PER 1 ML: Performed by: INTERNAL MEDICINE

## 2024-04-24 PROCEDURE — 25010000002 CEFAZOLIN PER 500 MG: Performed by: INTERNAL MEDICINE

## 2024-04-24 PROCEDURE — 71045 X-RAY EXAM CHEST 1 VIEW: CPT

## 2024-04-24 PROCEDURE — 25010000002 LIDOCAINE 1 % SOLUTION: Performed by: INTERNAL MEDICINE

## 2024-04-24 PROCEDURE — C1894 INTRO/SHEATH, NON-LASER: HCPCS | Performed by: INTERNAL MEDICINE

## 2024-04-24 PROCEDURE — 82948 REAGENT STRIP/BLOOD GLUCOSE: CPT

## 2024-04-24 PROCEDURE — 25010000002 VANCOMYCIN 1 G RECONSTITUTED SOLUTION 1 EACH VIAL: Performed by: INTERNAL MEDICINE

## 2024-04-24 PROCEDURE — 25810000003 SODIUM CHLORIDE 0.9 % SOLUTION 250 ML FLEX CONT: Performed by: INTERNAL MEDICINE

## 2024-04-24 PROCEDURE — 25010000002 FENTANYL CITRATE (PF) 50 MCG/ML SOLUTION: Performed by: INTERNAL MEDICINE

## 2024-04-24 PROCEDURE — 33208 INSRT HEART PM ATRIAL & VENT: CPT | Performed by: INTERNAL MEDICINE

## 2024-04-24 PROCEDURE — 25010000002 MIDAZOLAM PER 1 MG: Performed by: INTERNAL MEDICINE

## 2024-04-24 DEVICE — LD PM SOLIA S 53: Type: IMPLANTABLE DEVICE | Site: CHEST | Status: FUNCTIONAL

## 2024-04-24 DEVICE — LD PM SOLIA S 45: Type: IMPLANTABLE DEVICE | Site: CHEST | Status: FUNCTIONAL

## 2024-04-24 DEVICE — IMPLANTABLE DEVICE
Type: IMPLANTABLE DEVICE | Site: CHEST | Status: FUNCTIONAL
Brand: EDORA 8 DR-T

## 2024-04-24 RX ORDER — SODIUM CHLORIDE 9 MG/ML
40 INJECTION, SOLUTION INTRAVENOUS AS NEEDED
Status: DISCONTINUED | OUTPATIENT
Start: 2024-04-24 | End: 2024-04-24 | Stop reason: HOSPADM

## 2024-04-24 RX ORDER — SODIUM CHLORIDE 9 MG/ML
75 INJECTION, SOLUTION INTRAVENOUS CONTINUOUS
Status: DISCONTINUED | OUTPATIENT
Start: 2024-04-24 | End: 2024-04-24 | Stop reason: HOSPADM

## 2024-04-24 RX ORDER — LIDOCAINE HYDROCHLORIDE 10 MG/ML
INJECTION, SOLUTION INFILTRATION; PERINEURAL
Status: DISCONTINUED | OUTPATIENT
Start: 2024-04-24 | End: 2024-04-24 | Stop reason: HOSPADM

## 2024-04-24 RX ORDER — MIDAZOLAM HYDROCHLORIDE 1 MG/ML
INJECTION INTRAMUSCULAR; INTRAVENOUS
Status: DISCONTINUED | OUTPATIENT
Start: 2024-04-24 | End: 2024-04-24 | Stop reason: HOSPADM

## 2024-04-24 RX ORDER — SODIUM CHLORIDE 0.9 % (FLUSH) 0.9 %
10 SYRINGE (ML) INJECTION AS NEEDED
Status: DISCONTINUED | OUTPATIENT
Start: 2024-04-24 | End: 2024-04-24 | Stop reason: HOSPADM

## 2024-04-24 RX ORDER — CEFAZOLIN SODIUM 2 G/100ML
INJECTION, SOLUTION INTRAVENOUS
Status: COMPLETED | OUTPATIENT
Start: 2024-04-24 | End: 2024-04-24

## 2024-04-24 RX ORDER — SODIUM CHLORIDE 0.9 % (FLUSH) 0.9 %
10 SYRINGE (ML) INJECTION EVERY 12 HOURS SCHEDULED
Status: DISCONTINUED | OUTPATIENT
Start: 2024-04-24 | End: 2024-04-24 | Stop reason: HOSPADM

## 2024-04-24 RX ORDER — FENTANYL CITRATE 50 UG/ML
INJECTION, SOLUTION INTRAMUSCULAR; INTRAVENOUS
Status: DISCONTINUED | OUTPATIENT
Start: 2024-04-24 | End: 2024-04-24 | Stop reason: HOSPADM

## 2024-04-24 RX ADMIN — VANCOMYCIN HYDROCHLORIDE 1000 MG: 1 INJECTION, POWDER, LYOPHILIZED, FOR SOLUTION INTRAVENOUS at 07:43

## 2024-04-24 RX ADMIN — SODIUM CHLORIDE 75 ML/HR: 9 INJECTION, SOLUTION INTRAVENOUS at 07:35

## 2024-04-24 RX ADMIN — SODIUM CHLORIDE 2000 MG: 900 INJECTION INTRAVENOUS at 12:06

## 2024-04-24 NOTE — DISCHARGE INSTRUCTIONS
"Dr Kath Smith  [address] [id number] [phone]      Post Pacemaker / Defibrillator Implant Instructions      1.  The dressing may be removed in 48 hours.    2. If steri-strips were used, they should not be removed. Allow them to \"fall off\".      3. You may shower after the dressing is removed. Do not allow shower water to hit directly on incision.    4. No lotion/powder/ointment/cream on incision until it is healed.    5. Gently let water run down incision and pat dry.    6. No heavy lifting, pulling, or pushing.    7. Do not raise the affected arm over your head for a minimum of 1 month.  Wear sling at all times until follow up appointment.    8.  No driving until you are seen in the office for your follow-up appointment.    9.  Call to schedule a follow-up appointment in the office in two weeks.    10. Please call the office if you experience any of the following:   bleeding or drainage from your incision   swelling, redness, or opening of your incision   fever or chills   pain not relieved with medication   chest pain or difficulty breathing   lightheadness    11. For defibrillator patients only: If you receive a shock from your device, please call the office. If you receive 2 or more shocks within a 24 hour period OR if you receive 1 shock and feel poorly, you should be evaluated in the emergency room. Please DO NOT DRIVE if you have received a shock until your device has been checked.   "

## 2024-04-26 LAB
QT INTERVAL: 454 MS
QTC INTERVAL: 469 MS

## 2024-05-08 ENCOUNTER — OFFICE VISIT (OUTPATIENT)
Dept: CARDIOLOGY | Facility: CLINIC | Age: 79
End: 2024-05-08
Payer: MEDICARE

## 2024-05-08 ENCOUNTER — CLINICAL SUPPORT NO REQUIREMENTS (OUTPATIENT)
Dept: CARDIOLOGY | Facility: CLINIC | Age: 79
End: 2024-05-08
Payer: MEDICARE

## 2024-05-08 VITALS
SYSTOLIC BLOOD PRESSURE: 118 MMHG | WEIGHT: 131 LBS | HEART RATE: 66 BPM | BODY MASS INDEX: 24.11 KG/M2 | DIASTOLIC BLOOD PRESSURE: 70 MMHG | HEIGHT: 62 IN

## 2024-05-08 DIAGNOSIS — Z09 HOSPITAL DISCHARGE FOLLOW-UP: Primary | ICD-10-CM

## 2024-05-08 DIAGNOSIS — Z95.0 PRESENCE OF CARDIAC PACEMAKER: ICD-10-CM

## 2024-05-08 DIAGNOSIS — I48.0 PAROXYSMAL ATRIAL FIBRILLATION: ICD-10-CM

## 2024-05-08 DIAGNOSIS — I49.5 SICK SINUS SYNDROME: ICD-10-CM

## 2024-05-08 DIAGNOSIS — Z95.818 STATUS POST PLACEMENT OF IMPLANTABLE LOOP RECORDER: Primary | ICD-10-CM

## 2024-05-08 DIAGNOSIS — I25.118 CORONARY ARTERY DISEASE OF NATIVE ARTERY OF NATIVE HEART WITH STABLE ANGINA PECTORIS: ICD-10-CM

## 2024-06-05 ENCOUNTER — OFFICE VISIT (OUTPATIENT)
Dept: CARDIOLOGY | Facility: CLINIC | Age: 79
End: 2024-06-05
Payer: MEDICARE

## 2024-06-05 VITALS
HEART RATE: 70 BPM | WEIGHT: 135 LBS | HEIGHT: 62 IN | SYSTOLIC BLOOD PRESSURE: 124 MMHG | DIASTOLIC BLOOD PRESSURE: 72 MMHG | BODY MASS INDEX: 24.84 KG/M2

## 2024-06-05 DIAGNOSIS — I48.0 PAROXYSMAL ATRIAL FIBRILLATION: ICD-10-CM

## 2024-06-05 DIAGNOSIS — I49.5 SICK SINUS SYNDROME: Primary | ICD-10-CM

## 2024-06-05 PROCEDURE — 3078F DIAST BP <80 MM HG: CPT | Performed by: NURSE PRACTITIONER

## 2024-06-05 PROCEDURE — 1160F RVW MEDS BY RX/DR IN RCRD: CPT | Performed by: NURSE PRACTITIONER

## 2024-06-05 PROCEDURE — 93000 ELECTROCARDIOGRAM COMPLETE: CPT | Performed by: NURSE PRACTITIONER

## 2024-06-05 PROCEDURE — 1159F MED LIST DOCD IN RCRD: CPT | Performed by: NURSE PRACTITIONER

## 2024-06-05 PROCEDURE — 3074F SYST BP LT 130 MM HG: CPT | Performed by: NURSE PRACTITIONER

## 2024-06-05 PROCEDURE — 99213 OFFICE O/P EST LOW 20 MIN: CPT | Performed by: NURSE PRACTITIONER

## 2024-06-05 NOTE — PROGRESS NOTES
Subjective:        Michelle Atkinson is a 79 y.o. female who here for follow up    Chief Complaint   Patient presents with    Follow-up     6MO       HPI         Michelle Atkinson is an 79-year-old female who is new to this provider.  She has a history of CAD, hypertension, Biotronik pacemaker placed on 4/24/2024, history of lung cancer, PAF, stroke, and diabetes mellitus type 2. She is here today as for a site check and blood pressure check. She states she is still doing well and her site has healed up nicely.    Echo on 11/2/2023 revealed EF 46 to 50%, normal LV function.  LAC is mildly dilated, LAV is mildly increased and RAC is mildly dilated.  Normal stress test November 2, 2023.  Cardiac cath January 2024 with EF 50%, normal right-sided pressures, early atherosclerotic plaque otherwise normal coronary arteries.    The following portions of the patient's history were reviewed and updated as appropriate: allergies, current medications, past family history, past medical history, past social history, past surgical history and problem list.    Past Medical History:   Diagnosis Date    CAD (coronary artery disease)     Essential (primary) hypertension     Ex-cigarette smoker     Quit 1992    Lung cancer 2016    S/P Radiation and chemotherapy    Paroxysmal atrial fibrillation     Sick sinus syndrome 04/2024    Stroke 11/04/2023    Type 2 diabetes mellitus          reports that she has quit smoking. Her smoking use included cigarettes. She has never used smokeless tobacco. She reports that she does not currently use alcohol. She reports that she does not use drugs.     Family History   Problem Relation Age of Onset    Heart disease Mother     Heart disease Father     Heart disease Brother        ROS     Review of Systems  Constitutional: no wt loss, fever, fatigue  Gastrointestinal: no nausea, abdominal pain  Behavioral/Psych: no insomnia or anxiety  Cardiovascular: Denies chest pain, and shortness of  breath      Objective:           Constitutional:       Appearance: Well-developed.   Neck:      Vascular: No JVD.      Trachea: No tracheal deviation.   Pulmonary:      Effort: Pulmonary effort is normal. No respiratory distress.      Breath sounds: Normal breath sounds. No stridor. No decreased breath sounds. No wheezing. No rhonchi. No rales.   Chest:      Chest wall: Not tender to palpatation.   Cardiovascular:      Normal rate. Regular rhythm.      No gallop.       Comments: Is no signs or symptoms of infection or hematoma noted.  Pulses:     Intact distal pulses.   Edema:     Peripheral edema absent.   Abdominal:      General: Bowel sounds are normal. There is no distension.      Palpations: Abdomen is soft.      Tenderness: There is no abdominal tenderness.   Skin:     General: Skin is warm.   Neurological:      Mental Status: Alert and oriented to person, place, and time.      Deep Tendon Reflexes: Reflexes are normal and symmetric.         ECG 12 Lead    Date/Time: 6/5/2024 11:08 AM  Performed by: Adri Schuster APRN    Authorized by: Adri Schuster APRN  Comparison: compared with previous ECG from 4/24/2024  Similar to previous ECG  Rhythm: paced    Clinical impression: abnormal EKG        Conclusion         Successful dual-chamber pacemaker implantation    Successful loop recorder removal     INDICATIONS:  The patient is a elderly with shortness of breath.      PROCEDURE:  Left and right heart cath, coronary angiography, left ventriculography.    Risks, benefits, and alternatives were explained to the patient and informed consent was obtained.    The patient was brought to the cath lab and was prepped and draped in the normal sterile technique.  The right radial artery was cannulated and a 5 Andorran sheath was inserted.  The right brachial vein was cannulated and a 5 Andorran sheath was inserted.  A Chelsea-Hermann catheter was used for right heart catheterization and cardiac output index was calculated  using Brett method.  Following that, left coronary angiography, right coronary angiography, and left ventriculography was performed using Yas catheters.  The right heart cath pressures were measured.  At the end of the procedure the catheters were withdrawn and the sheaths were removed.  Hemostasis was obtained with a TR band device and the patient was moved to the floor in stable condition.  There were no complications.    HEMODYNAMIC / ANGIOGRAPHIC DATA:    Pulmonary capillary wedge pressure was 8.   Pulmonary artery systolic pressure was 23/8.  Right atrial pressure was 6.  Cardiac index was 1.9.  Left ventricular end diastolic pressure was 8 mmHg.  Left ventriculography revealed the EF to be 50%.  The left main is normal left main.  The LAD is normal left anterior descending artery with the diagonal branch 50% stenosis.  Circumflex nondominant and normal  The right coronary artery is dominant and normal.    RECOMMENDATIONS:  Post-procedure care will focus on prevention of any ischemic events and congestive complications.   Interpretation Summary         Left ventricular ejection fraction is normal (Calculated EF = 60%).    Myocardial perfusion imaging indicates a normal myocardial perfusion study with no evidence of ischemia.    Impressions are consistent with a low risk study.    Findings consistent with a normal ECG stress test.     Asymptomatic for chest pain. No ECG evidence of ischemia  Ectopy: PVC in recovery  Blood pressure response:  appropriate.   Pharmacologic study due to inability to tolerate ambulation on treadmill, ambulates with cane,    Supervised by:  Argenis CLEMENTS.      Interpretation Summary         Left ventricular ejection fraction appears to be 46 - 50%.    Left ventricular diastolic function was normal.    The left atrial cavity is mildly dilated.    Left atrial volume is mildly increased.    The right atrial cavity is mildly  dilated.    Estimated right ventricular systolic  pressure from tricuspid regurgitation is normal (<35 mmHg).   Interpretation Summary         Left ventricular ejection fraction appears to be 46 - 50%.    Left ventricular diastolic function was normal.    The left atrial cavity is mildly dilated.    Left atrial volume is mildly increased.    The right atrial cavity is mildly  dilated.    Estimated right ventricular systolic pressure from tricuspid regurgitation is normal (<35 mmHg).   Interpretation Summary         Left ventricular ejection fraction is normal (Calculated EF = 60%).    Myocardial perfusion imaging indicates a normal myocardial perfusion study with no evidence of ischemia.    Impressions are consistent with a low risk study.    Findings consistent with a normal ECG stress test.     Asymptomatic for chest pain. No ECG evidence of ischemia  Ectopy: PVC in recovery  Blood pressure response:  appropriate.   Pharmacologic study due to inability to tolerate ambulation on treadmill, ambulates with cane,    Supervised by:  Argensi CLEMENTS.          Current Outpatient Medications:     alendronate (FOSAMAX) 70 MG tablet, Take 1 tablet by mouth Every 7 (Seven) Days. Tuesday, Disp: , Rfl:     amLODIPine (NORVASC) 5 MG tablet, Take 1 tablet by mouth Daily., Disp: , Rfl:     apixaban (ELIQUIS) 5 MG tablet tablet, Take 1 tablet by mouth Every 12 (Twelve) Hours. Indications: Atrial Fibrillation, Disp: , Rfl:     atorvastatin (LIPITOR) 40 MG tablet, Take 1 tablet by mouth Every Night., Disp: , Rfl:     bisoprolol-hydrochlorothiazide (ZIAC) 5-6.25 MG per tablet, Take 1 tablet by mouth Daily., Disp: 90 tablet, Rfl: 3    Farxiga 10 MG tablet, take 1 tablet (10 mg) by mouth once daily in the morning, Disp: , Rfl:     glipizide (GLUCOTROL XL) 10 MG 24 hr tablet, 2 (Two) Times a Day., Disp: , Rfl:     Mirabegron (MYRBETRIQ PO), Take 50 mg by mouth Daily., Disp: , Rfl:     omeprazole (priLOSEC) 20 MG capsule, 1 capsule Every Night., Disp: , Rfl:     pioglitazone (ACTOS)  30 MG tablet, Take 1 tablet by mouth Daily., Disp: , Rfl:     potassium chloride 10 MEQ CR tablet, Take 1 tablet by mouth Daily., Disp: 90 tablet, Rfl: 2     Assessment:        Patient Active Problem List   Diagnosis    Abnormal EKG    Coronary artery disease of native artery of native heart with stable angina pectoris    Chronic anticoagulation    Paroxysmal atrial fibrillation    CVA (cerebrovascular accident)    Status post placement of implantable loop recorder    Primary hypertension    Precordial pain    SOB (shortness of breath)    Renal failure    Weak    Hypotension    Dizziness    Sick sinus syndrome     CHADS-VASc Risk Assessment               7 Total Score    1 Hypertension    2 Age >/= 75    1 DM    2 PRIOR STROKE/TIA/THROMBO    1 Sex: Female    Criteria that do not apply:    CHF    Vascular Disease    Age 65-74                    Plan:   1.  Hypertension: She is here today for a blood pressure check.  Her blood pressure has improved.  No changes.    Educated patient on exercising for at least 30 minutes a day for 2 to 3 days a week. Importance of controlling hypertension and blood pressure checkup on the regular basis has been explained. Hypertension as a silent killer has been discussed. Risk reduction of the weight and regular exercises to control the hypertension has been explained.      2.  Biotronik device: Site shows no signs or symptoms of infection or hematoma noted.  Functioning properly.      3.  CAD: She denies any chest pain.  Previous cath in 2023.  No intervention.    Risk reduction for the coronary artery disease, controlling the blood pressure, blood sugar management, cholesterol management, exercise, stress management, and proper compliance with medications and follow-up has been discussed    4.  History of PAF: Controlled.  Continue beta-blocker and anticoagulation.  She denies any bleeding.    Pros and cons as well as indication of the anticoagulation has been explained to the  patient in detail. There are no obvious complications at this stage. Risk of  the bleedings has been explained. Need for the regular blood workup and adjust the dose has been explained. Need for proper follow-up on anticoagulation also has been explained.     5.  Hyperlipidemia: She states her primary care manages her labs and cholesterol.  No changes.  Risk of the hyperlipidemia, importance of the treatment has been explained. Pros and cons of the statins has been explained. Regular blood workup as well as side effects including the liver failure, myelopathy death has been explained.                    No diagnosis found.    There are no diagnoses linked to this encounter.    COUNSELING: susi Coffey was given to patient for the following topics: diagnostic results, risk factor reductions, impressions, risks and benefits of treatment options and importance of treatment compliance .       SMOKING COUNSELING:  denies    Eliquis samples given. Follow-up in 6 months, unless she needs to be seen sooner.     Sincerely,   STARR Melendez  [facility]  06/05/24  11:05 EDT    EMR Dragon/Transcription disclaimer: Dictated utilizing Dragon Dictation

## 2024-07-22 ENCOUNTER — TELEPHONE (OUTPATIENT)
Dept: CARDIOLOGY | Facility: CLINIC | Age: 79
End: 2024-07-22

## 2024-08-08 ENCOUNTER — TELEPHONE (OUTPATIENT)
Dept: CARDIOLOGY | Facility: CLINIC | Age: 79
End: 2024-08-08

## 2024-08-08 NOTE — TELEPHONE ENCOUNTER
Caller Name: Michelle Atkinson DAREN      Relationship: Self      Best Contact Number: [phone]      Patient is requesting samples of ELIQUIS 5 MG       How many days of medication do you have left? 3 DAYS        Additional Information:PLEASE CALL PT

## 2024-10-02 ENCOUNTER — TELEPHONE (OUTPATIENT)
Dept: CARDIOLOGY | Facility: CLINIC | Age: 79
End: 2024-10-02

## 2024-10-02 ENCOUNTER — TELEPHONE (OUTPATIENT)
Dept: CARDIOLOGY | Facility: CLINIC | Age: 79
End: 2024-10-02
Payer: MEDICARE

## 2024-10-02 NOTE — TELEPHONE ENCOUNTER
Caller: DIPTI SHUKLA    Relationship:SELF    Callback number: [phone]   Is it ok to leave a message: [x] Yes [] No    Requested medication for samples: ELIQUIS 5MG    Who will be picking up the samples: SELF    Do you need information about patient financial assistance for this medication: [] Yes [x] No    Additional details provided:

## 2024-10-02 NOTE — TELEPHONE ENCOUNTER
Caller: Michelle Atkinson    Relationship to patient: Self    Best call back number: [phone]    Chief complaint: DIZZINESS, SHORTNESS OF BREATH, LOW BLOOD PRESSURE    Patient directed to call 911 or go to their nearest emergency room.     Patient verbalized understanding: [x] Yes  [] No  If no, why?    Additional notes:HUB ATTEMPTED WT TO PRACTICE, PRACTICE ADVISED HUB TO TELL PATIENT TO GO TO ED. HUB RELAYED TO PATIENT TO GO TO ED.

## 2024-10-04 ENCOUNTER — OFFICE VISIT (OUTPATIENT)
Dept: CARDIOLOGY | Facility: CLINIC | Age: 79
End: 2024-10-04
Payer: MEDICARE

## 2024-10-04 VITALS
WEIGHT: 136 LBS | DIASTOLIC BLOOD PRESSURE: 77 MMHG | HEART RATE: 71 BPM | BODY MASS INDEX: 25.03 KG/M2 | SYSTOLIC BLOOD PRESSURE: 129 MMHG | HEIGHT: 62 IN

## 2024-10-04 DIAGNOSIS — I95.9 HYPOTENSION, UNSPECIFIED HYPOTENSION TYPE: ICD-10-CM

## 2024-10-04 DIAGNOSIS — I48.0 PAROXYSMAL ATRIAL FIBRILLATION: Primary | ICD-10-CM

## 2024-10-04 DIAGNOSIS — I25.118 CORONARY ARTERY DISEASE OF NATIVE ARTERY OF NATIVE HEART WITH STABLE ANGINA PECTORIS: ICD-10-CM

## 2024-10-04 PROCEDURE — 3078F DIAST BP <80 MM HG: CPT | Performed by: NURSE PRACTITIONER

## 2024-10-04 PROCEDURE — 3074F SYST BP LT 130 MM HG: CPT | Performed by: NURSE PRACTITIONER

## 2024-10-04 PROCEDURE — 1159F MED LIST DOCD IN RCRD: CPT | Performed by: NURSE PRACTITIONER

## 2024-10-04 PROCEDURE — 99213 OFFICE O/P EST LOW 20 MIN: CPT | Performed by: NURSE PRACTITIONER

## 2024-10-04 PROCEDURE — 1160F RVW MEDS BY RX/DR IN RCRD: CPT | Performed by: NURSE PRACTITIONER

## 2024-10-04 RX ORDER — SOLIFENACIN SUCCINATE 5 MG/1
TABLET, FILM COATED ORAL DAILY
COMMUNITY

## 2024-10-04 RX ORDER — BISOPROLOL FUMARATE 5 MG/1
5 TABLET, FILM COATED ORAL DAILY
COMMUNITY

## 2024-10-04 NOTE — PROGRESS NOTES
Subjective:        Michelle Atkinson is a 79 y.o. female who here for follow up    Chief Complaint   Patient presents with    Follow-up     LOW BP IN AM        HPI  Michelle Atkinson is an 79-year-old who has been experiencing some blood pressure issues.  She has a history of CAD, hypertension, ex-smoker, lung cancer, SSS, PAF, history of stroke and diabetes mellitus type 2.      11/2/2023 echo: EF 46 to 50%, normal LV function, LAC is mildly dilated, LAV is mildly increased and RAC is mildly dilated.  Normal stress test on November 2, 2023.  Cardiac cath January 2024 with EF 50%, normal right-sided pressures, early atherosclerotic plaque otherwise normal coronaries.       The following portions of the patient's history were reviewed and updated as appropriate: allergies, current medications, past family history, past medical history, past social history, past surgical history and problem list.    Past Medical History:   Diagnosis Date    CAD (coronary artery disease)     Essential (primary) hypertension     Ex-cigarette smoker     Quit 1992    Lung cancer 2016    S/P Radiation and chemotherapy    Paroxysmal atrial fibrillation     Sick sinus syndrome 04/2024    Stroke 11/04/2023    Type 2 diabetes mellitus          reports that she has quit smoking. Her smoking use included cigarettes. She has never used smokeless tobacco. She reports that she does not currently use alcohol. She reports that she does not use drugs.     Family History   Problem Relation Age of Onset    Heart disease Mother     Heart disease Father     Heart disease Brother        ROS     Review of Systems  Constitutional: No wt loss, fever, fatigue  Gastrointestinal: No nausea, abdominal pain  Behavioral/Psych: No insomnia or anxiety  Cardiovascular [place] chest pain, and shortness of breath.      Objective:           Vitals and nursing note reviewed.   Constitutional:       Appearance: Well-developed.   HENT:      Head: Normocephalic.      Right Ear:  External ear normal.      Left Ear: External ear normal.   Neck:      Vascular: No JVD.   Pulmonary:      Effort: Pulmonary effort is normal. No respiratory distress.      Breath sounds: Normal breath sounds. No stridor. No rales.   Cardiovascular:      Normal rate. Regular rhythm.      No gallop.    Pulses:     Intact distal pulses.   Edema:     Peripheral edema absent.   Abdominal:      General: Bowel sounds are normal. There is no distension.      Palpations: Abdomen is soft.      Tenderness: There is no abdominal tenderness. There is no guarding.   Musculoskeletal: Normal range of motion.         General: No tenderness.      Cervical back: Normal range of motion. Skin:     General: Skin is warm.   Neurological:      Mental Status: Alert and oriented to person, place, and time.      Deep Tendon Reflexes: Reflexes are normal and symmetric.   Psychiatric:         Judgment: Judgment normal.         Procedures      11/2023      Interpretation Summary         Left ventricular ejection fraction appears to be 46 - 50%.    Left ventricular diastolic function was normal.    The left atrial cavity is mildly dilated.    Left atrial volume is mildly increased.    The right atrial cavity is mildly  dilated.    Estimated right ventricular systolic pressure from tricuspid regurgitation is normal (<35 mmHg).     2023    Interpretation Summary         Left ventricular ejection fraction is normal (Calculated EF = 60%).    Myocardial perfusion imaging indicates a normal myocardial perfusion study with no evidence of ischemia.    Impressions are consistent with a low risk study.    Findings consistent with a normal ECG stress test.     Asymptomatic for chest pain. No ECG evidence of ischemia  Ectopy: PVC in recovery  Blood pressure response:  appropriate.   Pharmacologic study due to inability to tolerate ambulation on treadmill, ambulates with cane,    Supervised by:  Argenis CLEMENTS.        2024  Conclusion         Successful  dual-chamber pacemaker implantation    Successful loop recorder removal      2024      Conclusion         Successful right and left coronary angiogram and LV gram    Successful right heart catheter    Normal right-sided pressures    Early atherosclerotic plaque otherwise normal coronary arteries    Normal EF of 50%     Current Outpatient Medications:     alendronate (FOSAMAX) 70 MG tablet, Take 1 tablet by mouth Every 7 (Seven) Days. Tuesday, Disp: , Rfl:     apixaban (ELIQUIS) 5 MG tablet tablet, Take 1 tablet by mouth Every 12 (Twelve) Hours. Indications: Atrial Fibrillation, Disp: , Rfl:     atorvastatin (LIPITOR) 40 MG tablet, Take 1 tablet by mouth Every Night., Disp: , Rfl:     bisoprolol (ZEBeta) 5 MG tablet, Take 1 tablet by mouth Daily., Disp: , Rfl:     Farxiga 10 MG tablet, take 1 tablet (10 mg) by mouth once daily in the morning, Disp: , Rfl:     glipizide (GLUCOTROL XL) 10 MG 24 hr tablet, 2 (Two) Times a Day., Disp: , Rfl:     omeprazole (priLOSEC) 20 MG capsule, 1 capsule Every Night., Disp: , Rfl:     pioglitazone (ACTOS) 30 MG tablet, Take 1 tablet by mouth Daily., Disp: , Rfl:     potassium chloride 10 MEQ CR tablet, Take 1 tablet by mouth Daily., Disp: 90 tablet, Rfl: 2    solifenacin (VESICARE) 5 MG tablet, Take  by mouth Daily., Disp: , Rfl:      Assessment:        Patient Active Problem List   Diagnosis    Abnormal EKG    Coronary artery disease of native artery of native heart with stable angina pectoris    Chronic anticoagulation    Paroxysmal atrial fibrillation    CVA (cerebrovascular accident)    Status post placement of implantable loop recorder    Primary hypertension    Precordial pain    SOB (shortness of breath)    Renal failure    Weak    Hypotension    Dizziness    Sick sinus syndrome               Plan:   1.  Hypertension: Blood pressure today stable.  She will start taking her medications at night and follow up in 2 weeks over the phone. She states sometimes she has some low blood  pressures.  I stated that she should check her blood pressure for a while to see if her blood pressure is stable enough to take her medicine, or if she gets dizzy.     Educated patient on exercising for at least 30 minutes a day for 2 to 3 days a week. Importance of controlling hypertension and blood pressure checkup on the regular basis has been explained. Hypertension as a silent killer has been discussed. Risk reduction of the weight and regular exercises to control the hypertension has been explained.      2.  Biotronik device    3.  CAD: Chest pain.  Previous ischemic workup as above.    Risk reduction for the coronary artery disease, controlling the blood pressure, blood sugar management, cholesterol management, exercise, stress management, and proper compliance with medications and follow-up has been discussed      4.  History of PAF: Controlled.  Continue beta-blocker and anticoagulation.    Pros and cons as well as indication of the anticoagulation has been explained to the patient in detail. There are no obvious complications at this stage. Risk of  the bleedings has been explained. Need for the regular blood workup and adjust the dose has been explained. Need for proper follow-up on anticoagulation also has been explained.       5.  Hyperlipidemia: Her primary care manages her cholesterol labs.    Risk of the hyperlipidemia, importance of the treatment has been explained     Pros and cons of the statins has been explained     Regular blood workup as well as side effects including the liver failure, myelopathy death has been explained                          No diagnosis found.    There are no diagnoses linked to this encounter.    COUNSELING:susi Coffey was given to patient for the following topics: diagnostic results, risk factor reductions, impressions, risks and benefits of treatment options and importance of treatment compliance .       SMOKING COUNSELING: denies    She will  follow-up in 2 months for a blood pressure check.  She will call with any concerns.    Sincerely,   STARR Melendez  [facility]  10/04/24  10:27 EDT    EMR Dragon/Transcription disclaimer: Dictated utilizing Dragon Dictation

## 2024-10-04 NOTE — PATIENT INSTRUCTIONS
-start taking bisoprolol at night.     -Please have her Monitor blood pressure prior to taking her coreg and if her sys is <100 or her HR <55 then have her hold her coreg. She should also take her blood pressure 1 hour and a half after taking blood pressure medications and  write the reading down along with heart rate.  Please have her bring these readings with her on her follow up. Hold for sys <100 or HR <60

## 2024-11-05 ENCOUNTER — TELEPHONE (OUTPATIENT)
Dept: CARDIOLOGY | Facility: CLINIC | Age: 79
End: 2024-11-05

## 2024-11-05 NOTE — TELEPHONE ENCOUNTER
Caller: PT    Relationship:PT    Callback number: [phone]    Is it ok to leave a message: [x] Yes [] No    Requested medication for samples: ELIQUIS    How much medication does the patient currently have left: ENOUGH FOR TODAY    Who will be picking up the samples: PT    Do you need information about patient financial assistance for this medication: [] Yes [x] No    Additional details provided:

## 2024-11-06 ENCOUNTER — CLINICAL SUPPORT NO REQUIREMENTS (OUTPATIENT)
Dept: CARDIOLOGY | Facility: CLINIC | Age: 79
End: 2024-11-06
Payer: MEDICARE

## 2024-11-06 DIAGNOSIS — Z45.018 PACEMAKER REPROGRAMMING/CHECK: Primary | ICD-10-CM

## 2024-11-08 PROBLEM — Z45.018 PACEMAKER REPROGRAMMING/CHECK: Status: ACTIVE | Noted: 2024-11-08

## 2024-11-26 ENCOUNTER — TELEPHONE (OUTPATIENT)
Dept: CARDIOLOGY | Facility: CLINIC | Age: 79
End: 2024-11-26
Payer: MEDICARE

## 2024-11-26 NOTE — TELEPHONE ENCOUNTER
Caller: DIPTI ALEJANDRADWIN    Relationship:SELF    Callback number: [phone]   Is it ok to leave a message: [x] Yes [] No    Requested medication for samples: apixaban (ELIQUIS) 5 MG tablet tablet     How much medication does the patient currently have left: NEEDS THEM BY THE END OF THE WEEK    Who will be picking up the samples: PATIENT     Do you need information about patient financial assistance for this medication: [] Yes [x] No    Additional details provided: PT STATED SHE NEEDED THEM BY FRIDAY. HUB TOLD PT THE OFFICE WOULD BE CLOSED FRIDAY. PT STATED IT MIGHT BE NEXT WEEK BEFORE SHE CAN  MEDICATION SINCE WE ARE CLOSED FRIDAY

## 2024-12-09 ENCOUNTER — OFFICE VISIT (OUTPATIENT)
Dept: CARDIOLOGY | Facility: CLINIC | Age: 79
End: 2024-12-09
Payer: MEDICARE

## 2024-12-09 VITALS
BODY MASS INDEX: 25.03 KG/M2 | HEIGHT: 62 IN | SYSTOLIC BLOOD PRESSURE: 135 MMHG | HEART RATE: 71 BPM | WEIGHT: 136 LBS | DIASTOLIC BLOOD PRESSURE: 80 MMHG

## 2024-12-09 DIAGNOSIS — R94.31 ABNORMAL EKG: ICD-10-CM

## 2024-12-09 DIAGNOSIS — I48.0 PAROXYSMAL ATRIAL FIBRILLATION: ICD-10-CM

## 2024-12-09 DIAGNOSIS — Z95.0 PRESENCE OF CARDIAC PACEMAKER: ICD-10-CM

## 2024-12-09 DIAGNOSIS — I25.118 CORONARY ARTERY DISEASE OF NATIVE ARTERY OF NATIVE HEART WITH STABLE ANGINA PECTORIS: Primary | ICD-10-CM

## 2024-12-09 PROCEDURE — 3075F SYST BP GE 130 - 139MM HG: CPT | Performed by: INTERNAL MEDICINE

## 2024-12-09 PROCEDURE — 3079F DIAST BP 80-89 MM HG: CPT | Performed by: INTERNAL MEDICINE

## 2024-12-09 PROCEDURE — 99213 OFFICE O/P EST LOW 20 MIN: CPT | Performed by: INTERNAL MEDICINE

## 2024-12-09 NOTE — PROGRESS NOTES
Follow up blood pressure check   Subjective:        Michelle Atkinson is a 79 y.o. female who here for follow up    CC  Follow-up coronary artery disease pacemaker  HPI  79-year-old female with coronary artery disease paroxysmal atrial fibrillation pacemaker here for the follow-up and the blood pressure check     Problems Addressed this Visit          Cardiac and Vasculature    Abnormal EKG    Coronary artery disease of native artery of native heart with stable angina pectoris - Primary    Relevant Orders    Adult Transthoracic Echo Complete W/ Cont if Necessary Per Protocol    Paroxysmal atrial fibrillation    Relevant Orders    Adult Transthoracic Echo Complete W/ Cont if Necessary Per Protocol     Other Visit Diagnoses       Presence of cardiac pacemaker              Diagnoses         Codes Comments    Coronary artery disease of native artery of native heart with stable angina pectoris    -  Primary ICD-10-CM: I25.118  ICD-9-CM: 414.01, 413.9     Paroxysmal atrial fibrillation     ICD-10-CM: I48.0  ICD-9-CM: 427.31     Abnormal EKG     ICD-10-CM: R94.31  ICD-9-CM: 794.31     Presence of cardiac pacemaker     ICD-10-CM: Z95.0  ICD-9-CM: V45.01           .    The following portions of the patient's history were reviewed and updated as appropriate: allergies, current medications, past family history, past medical history, past social history, past surgical history and problem list.    Past Medical History:   Diagnosis Date    CAD (coronary artery disease)     Essential (primary) hypertension     Ex-cigarette smoker     Quit 1992    Lung cancer 2016    S/P Radiation and chemotherapy    Paroxysmal atrial fibrillation     Sick sinus syndrome 04/2024    Stroke 11/04/2023    Type 2 diabetes mellitus      reports that she has quit smoking. Her smoking use included cigarettes. She has never used smokeless tobacco. She reports that she does not currently use alcohol. She reports that she does not use drugs.   Family History  "  Problem Relation Age of Onset    Heart disease Mother     Heart disease Father     Heart disease Brother        Review of Systems  Constitutional: No wt loss, fever, fatigue  Gastrointestinal: No nausea, abdominal pain  Behavioral/Psych: No insomnia or anxiety   Cardiovascular no chest pains or tightness in the chest  Objective:       Physical Exam  /80   Pulse 71   Ht 157.5 cm (62.01\")   Wt 61.7 kg (136 lb)   BMI 24.87 kg/m²   General appearance: No acute changes   Neck: Trachea midline; NECK, supple, no thyromegaly or lymphadenopathy   Lungs: Normal size and shape, normal breath sounds, equal distribution of air, no rales and rhonchi   CV: S1-S2 regular, no murmurs, no rub, no gallop   Abdomen: Soft, nontender; no masses , no abnormal abdominal sounds   Extremities: No deformity , normal color , no peripheral edema   Skin: Normal temperature, turgor and texture; no rash, ulcers          Procedures      Echocardiogram:    Results for orders placed in visit on 10/09/23    Adult Transthoracic Echo Complete W/ Cont if Necessary Per Protocol    Interpretation Summary    Left ventricular ejection fraction appears to be 46 - 50%.    Left ventricular diastolic function was normal.    The left atrial cavity is mildly dilated.    Left atrial volume is mildly increased.    The right atrial cavity is mildly  dilated.    Estimated right ventricular systolic pressure from tricuspid regurgitation is normal (<35 mmHg).          Current Outpatient Medications:     apixaban (ELIQUIS) 5 MG tablet tablet, Take 1 tablet by mouth Every 12 (Twelve) Hours. Indications: Atrial Fibrillation, Disp: , Rfl:     atorvastatin (LIPITOR) 40 MG tablet, Take 1 tablet by mouth Every Night., Disp: , Rfl:     bisoprolol (ZEBeta) 5 MG tablet, Take 1 tablet by mouth Daily., Disp: , Rfl:     glipizide (GLUCOTROL XL) 10 MG 24 hr tablet, 2 (Two) Times a Day., Disp: , Rfl:     omeprazole (priLOSEC) 20 MG capsule, 1 capsule Every Night., Disp: , " Rfl:     pioglitazone (ACTOS) 30 MG tablet, Take 1 tablet by mouth Daily., Disp: , Rfl:     potassium chloride 10 MEQ CR tablet, Take 1 tablet by mouth Daily., Disp: 90 tablet, Rfl: 2    alendronate (FOSAMAX) 70 MG tablet, Take 1 tablet by mouth Every 7 (Seven) Days. Tuesday, Disp: , Rfl:     Farxiga 10 MG tablet, take 1 tablet (10 mg) by mouth once daily in the morning (Patient not taking: Reported on 12/9/2024), Disp: , Rfl:     solifenacin (VESICARE) 5 MG tablet, Take  by mouth Daily. (Patient not taking: Reported on 12/9/2024), Disp: , Rfl:    Assessment:                Plan:          ICD-10-CM ICD-9-CM   1. Coronary artery disease of native artery of native heart with stable angina pectoris  I25.118 414.01     413.9   2. Paroxysmal atrial fibrillation  I48.0 427.31   3. Abnormal EKG  R94.31 794.31   4. Presence of cardiac pacemaker  Z95.0 V45.01     1. Coronary artery disease of native artery of native heart with stable angina pectoris  No angina pectoris  - Adult Transthoracic Echo Complete W/ Cont if Necessary Per Protocol; Future    2. Paroxysmal atrial fibrillation  Control  - Adult Transthoracic Echo Complete W/ Cont if Necessary Per Protocol; Future    3. Abnormal EKG  No chest pain    4. Presence of cardiac pacemaker  Functioning normal      1 yr  with echo  COUNSELING:    Michelle Coffey was given to patient for the following topics: diagnostic results, risk factor reductions, impressions, risks and benefits of treatment options and importance of treatment compliance .       SMOKING COUNSELING:        Dictated using Dragon dictation

## 2025-01-17 ENCOUNTER — HOSPITAL ENCOUNTER (INPATIENT)
Facility: HOSPITAL | Age: 80
LOS: 3 days | Discharge: HOME HEALTH | DRG: 321 | End: 2025-01-20
Attending: EMERGENCY MEDICINE | Admitting: STUDENT IN AN ORGANIZED HEALTH CARE EDUCATION/TRAINING PROGRAM
Payer: MEDICARE

## 2025-01-17 ENCOUNTER — APPOINTMENT (OUTPATIENT)
Dept: CT IMAGING | Facility: HOSPITAL | Age: 80
DRG: 321 | End: 2025-01-17
Payer: MEDICARE

## 2025-01-17 ENCOUNTER — APPOINTMENT (OUTPATIENT)
Dept: GENERAL RADIOLOGY | Facility: HOSPITAL | Age: 80
DRG: 321 | End: 2025-01-17
Payer: MEDICARE

## 2025-01-17 ENCOUNTER — APPOINTMENT (OUTPATIENT)
Dept: CARDIOLOGY | Facility: HOSPITAL | Age: 80
DRG: 321 | End: 2025-01-17
Payer: MEDICARE

## 2025-01-17 DIAGNOSIS — R07.9 CHEST PAIN, UNSPECIFIED TYPE: Primary | ICD-10-CM

## 2025-01-17 DIAGNOSIS — I21.4 NSTEMI (NON-ST ELEVATED MYOCARDIAL INFARCTION): ICD-10-CM

## 2025-01-17 DIAGNOSIS — I21.4 NON-STEMI (NON-ST ELEVATED MYOCARDIAL INFARCTION): ICD-10-CM

## 2025-01-17 LAB
ALBUMIN SERPL-MCNC: 3.4 G/DL (ref 3.5–5.2)
ALBUMIN/GLOB SERPL: 1.1 G/DL
ALP SERPL-CCNC: 68 U/L (ref 39–117)
ALT SERPL W P-5'-P-CCNC: 13 U/L (ref 1–33)
ANION GAP SERPL CALCULATED.3IONS-SCNC: 11.9 MMOL/L (ref 5–15)
APTT PPP: 32.3 SECONDS (ref 78–95.9)
AST SERPL-CCNC: 55 U/L (ref 1–32)
BASOPHILS # BLD AUTO: 0.03 10*3/MM3 (ref 0–0.2)
BASOPHILS NFR BLD AUTO: 0.5 % (ref 0–1.5)
BILIRUB SERPL-MCNC: 0.7 MG/DL (ref 0–1.2)
BUN SERPL-MCNC: 46 MG/DL (ref 8–23)
BUN/CREAT SERPL: 27.7 (ref 7–25)
CALCIUM SPEC-SCNC: 9.7 MG/DL (ref 8.6–10.5)
CHLORIDE SERPL-SCNC: 105 MMOL/L (ref 98–107)
CHOLEST SERPL-MCNC: 156 MG/DL (ref 0–200)
CO2 SERPL-SCNC: 21.1 MMOL/L (ref 22–29)
CREAT SERPL-MCNC: 1.66 MG/DL (ref 0.57–1)
DEPRECATED RDW RBC AUTO: 53.7 FL (ref 37–54)
EGFRCR SERPLBLD CKD-EPI 2021: 31.1 ML/MIN/1.73
EOSINOPHIL # BLD AUTO: 0.1 10*3/MM3 (ref 0–0.4)
EOSINOPHIL NFR BLD AUTO: 1.7 % (ref 0.3–6.2)
ERYTHROCYTE [DISTWIDTH] IN BLOOD BY AUTOMATED COUNT: 15.8 % (ref 12.3–15.4)
GEN 5 1HR TROPONIN T REFLEX: 1511 NG/L
GLOBULIN UR ELPH-MCNC: 3.2 GM/DL
GLUCOSE SERPL-MCNC: 235 MG/DL (ref 65–99)
HCT VFR BLD AUTO: 40.6 % (ref 34–46.6)
HDLC SERPL-MCNC: 45 MG/DL (ref 40–60)
HGB BLD-MCNC: 12.6 G/DL (ref 12–15.9)
HOLD SPECIMEN: NORMAL
HOLD SPECIMEN: NORMAL
IMM GRANULOCYTES # BLD AUTO: 0.02 10*3/MM3 (ref 0–0.05)
IMM GRANULOCYTES NFR BLD AUTO: 0.3 % (ref 0–0.5)
INR PPP: 1.18 (ref 0.86–1.15)
LDLC SERPL CALC-MCNC: 91 MG/DL (ref 0–100)
LDLC/HDLC SERPL: 1.96 {RATIO}
LIPASE SERPL-CCNC: 27 U/L (ref 13–60)
LYMPHOCYTES # BLD AUTO: 1.6 10*3/MM3 (ref 0.7–3.1)
LYMPHOCYTES NFR BLD AUTO: 26.5 % (ref 19.6–45.3)
MAGNESIUM SERPL-MCNC: 1.8 MG/DL (ref 1.6–2.4)
MCH RBC QN AUTO: 28.6 PG (ref 26.6–33)
MCHC RBC AUTO-ENTMCNC: 31 G/DL (ref 31.5–35.7)
MCV RBC AUTO: 92.1 FL (ref 79–97)
MONOCYTES # BLD AUTO: 0.45 10*3/MM3 (ref 0.1–0.9)
MONOCYTES NFR BLD AUTO: 7.5 % (ref 5–12)
NEUTROPHILS NFR BLD AUTO: 3.84 10*3/MM3 (ref 1.7–7)
NEUTROPHILS NFR BLD AUTO: 63.5 % (ref 42.7–76)
NRBC BLD AUTO-RTO: 0 /100 WBC (ref 0–0.2)
NT-PROBNP SERPL-MCNC: 1748 PG/ML (ref 0–1800)
PLATELET # BLD AUTO: 169 10*3/MM3 (ref 140–450)
PMV BLD AUTO: 11.8 FL (ref 6–12)
POTASSIUM SERPL-SCNC: 4.8 MMOL/L (ref 3.5–5.2)
PROT SERPL-MCNC: 6.6 G/DL (ref 6–8.5)
PROTHROMBIN TIME: 15.2 SECONDS (ref 11.8–14.9)
QT INTERVAL: 420 MS
QTC INTERVAL: 431 MS
RBC # BLD AUTO: 4.41 10*6/MM3 (ref 3.77–5.28)
SODIUM SERPL-SCNC: 138 MMOL/L (ref 136–145)
TRIGL SERPL-MCNC: 113 MG/DL (ref 0–150)
TROPONIN T % DELTA: 84
TROPONIN T NUMERIC DELTA: 688 NG/L
TROPONIN T SERPL HS-MCNC: 823 NG/L
TSH SERPL DL<=0.05 MIU/L-ACNC: 3.34 UIU/ML (ref 0.27–4.2)
VLDLC SERPL-MCNC: 20 MG/DL (ref 5–40)
WBC NRBC COR # BLD AUTO: 6.04 10*3/MM3 (ref 3.4–10.8)
WHOLE BLOOD HOLD COAG: NORMAL
WHOLE BLOOD HOLD SPECIMEN: NORMAL

## 2025-01-17 PROCEDURE — 82948 REAGENT STRIP/BLOOD GLUCOSE: CPT | Performed by: STUDENT IN AN ORGANIZED HEALTH CARE EDUCATION/TRAINING PROGRAM

## 2025-01-17 PROCEDURE — 25010000002 HEPARIN (PORCINE) 25000-0.45 UT/250ML-% SOLUTION: Performed by: EMERGENCY MEDICINE

## 2025-01-17 PROCEDURE — 80061 LIPID PANEL: CPT | Performed by: STUDENT IN AN ORGANIZED HEALTH CARE EDUCATION/TRAINING PROGRAM

## 2025-01-17 PROCEDURE — 36415 COLL VENOUS BLD VENIPUNCTURE: CPT

## 2025-01-17 PROCEDURE — 85610 PROTHROMBIN TIME: CPT | Performed by: EMERGENCY MEDICINE

## 2025-01-17 PROCEDURE — 93005 ELECTROCARDIOGRAM TRACING: CPT | Performed by: EMERGENCY MEDICINE

## 2025-01-17 PROCEDURE — 80053 COMPREHEN METABOLIC PANEL: CPT | Performed by: EMERGENCY MEDICINE

## 2025-01-17 PROCEDURE — 25010000002 ONDANSETRON PER 1 MG: Performed by: EMERGENCY MEDICINE

## 2025-01-17 PROCEDURE — 93306 TTE W/DOPPLER COMPLETE: CPT

## 2025-01-17 PROCEDURE — 83880 ASSAY OF NATRIURETIC PEPTIDE: CPT

## 2025-01-17 PROCEDURE — 85730 THROMBOPLASTIN TIME PARTIAL: CPT | Performed by: EMERGENCY MEDICINE

## 2025-01-17 PROCEDURE — 99291 CRITICAL CARE FIRST HOUR: CPT

## 2025-01-17 PROCEDURE — 83735 ASSAY OF MAGNESIUM: CPT

## 2025-01-17 PROCEDURE — 93306 TTE W/DOPPLER COMPLETE: CPT | Performed by: INTERNAL MEDICINE

## 2025-01-17 PROCEDURE — 93005 ELECTROCARDIOGRAM TRACING: CPT

## 2025-01-17 PROCEDURE — 25010000002 MORPHINE PER 10 MG: Performed by: EMERGENCY MEDICINE

## 2025-01-17 PROCEDURE — 83690 ASSAY OF LIPASE: CPT | Performed by: EMERGENCY MEDICINE

## 2025-01-17 PROCEDURE — 84484 ASSAY OF TROPONIN QUANT: CPT | Performed by: EMERGENCY MEDICINE

## 2025-01-17 PROCEDURE — 93010 ELECTROCARDIOGRAM REPORT: CPT | Performed by: INTERNAL MEDICINE

## 2025-01-17 PROCEDURE — 25810000003 SODIUM CHLORIDE 0.9 % SOLUTION: Performed by: EMERGENCY MEDICINE

## 2025-01-17 PROCEDURE — 84443 ASSAY THYROID STIM HORMONE: CPT | Performed by: STUDENT IN AN ORGANIZED HEALTH CARE EDUCATION/TRAINING PROGRAM

## 2025-01-17 PROCEDURE — 99223 1ST HOSP IP/OBS HIGH 75: CPT | Performed by: STUDENT IN AN ORGANIZED HEALTH CARE EDUCATION/TRAINING PROGRAM

## 2025-01-17 PROCEDURE — 71045 X-RAY EXAM CHEST 1 VIEW: CPT

## 2025-01-17 PROCEDURE — 83036 HEMOGLOBIN GLYCOSYLATED A1C: CPT | Performed by: STUDENT IN AN ORGANIZED HEALTH CARE EDUCATION/TRAINING PROGRAM

## 2025-01-17 PROCEDURE — 85025 COMPLETE CBC W/AUTO DIFF WBC: CPT

## 2025-01-17 RX ORDER — ASPIRIN 81 MG/1
324 TABLET, CHEWABLE ORAL ONCE
Status: DISCONTINUED | OUTPATIENT
Start: 2025-01-17 | End: 2025-01-17

## 2025-01-17 RX ORDER — SODIUM CHLORIDE 0.9 % (FLUSH) 0.9 %
10 SYRINGE (ML) INJECTION EVERY 12 HOURS SCHEDULED
Status: DISCONTINUED | OUTPATIENT
Start: 2025-01-17 | End: 2025-01-20 | Stop reason: HOSPADM

## 2025-01-17 RX ORDER — DEXTROSE MONOHYDRATE 25 G/50ML
25 INJECTION, SOLUTION INTRAVENOUS
Status: DISCONTINUED | OUTPATIENT
Start: 2025-01-17 | End: 2025-01-20 | Stop reason: HOSPADM

## 2025-01-17 RX ORDER — HYDRALAZINE HYDROCHLORIDE 20 MG/ML
10 INJECTION INTRAMUSCULAR; INTRAVENOUS EVERY 6 HOURS PRN
Status: DISCONTINUED | OUTPATIENT
Start: 2025-01-17 | End: 2025-01-20 | Stop reason: HOSPADM

## 2025-01-17 RX ORDER — BISACODYL 5 MG/1
5 TABLET, DELAYED RELEASE ORAL DAILY PRN
Status: DISCONTINUED | OUTPATIENT
Start: 2025-01-17 | End: 2025-01-20 | Stop reason: HOSPADM

## 2025-01-17 RX ORDER — ASPIRIN 81 MG/1
81 TABLET, CHEWABLE ORAL DAILY
Status: DISCONTINUED | OUTPATIENT
Start: 2025-01-18 | End: 2025-01-20

## 2025-01-17 RX ORDER — BISACODYL 10 MG
10 SUPPOSITORY, RECTAL RECTAL DAILY PRN
Status: DISCONTINUED | OUTPATIENT
Start: 2025-01-17 | End: 2025-01-20 | Stop reason: HOSPADM

## 2025-01-17 RX ORDER — AMOXICILLIN 250 MG
2 CAPSULE ORAL 2 TIMES DAILY PRN
Status: DISCONTINUED | OUTPATIENT
Start: 2025-01-17 | End: 2025-01-20 | Stop reason: HOSPADM

## 2025-01-17 RX ORDER — ACETAMINOPHEN 325 MG/1
650 TABLET ORAL EVERY 6 HOURS PRN
Status: DISCONTINUED | OUTPATIENT
Start: 2025-01-17 | End: 2025-01-19

## 2025-01-17 RX ORDER — ONDANSETRON 2 MG/ML
4 INJECTION INTRAMUSCULAR; INTRAVENOUS ONCE
Status: COMPLETED | OUTPATIENT
Start: 2025-01-17 | End: 2025-01-17

## 2025-01-17 RX ORDER — SODIUM CHLORIDE 9 MG/ML
40 INJECTION, SOLUTION INTRAVENOUS AS NEEDED
Status: DISCONTINUED | OUTPATIENT
Start: 2025-01-17 | End: 2025-01-20 | Stop reason: HOSPADM

## 2025-01-17 RX ORDER — GLIPIZIDE 10 MG/1
20 TABLET ORAL
COMMUNITY

## 2025-01-17 RX ORDER — ONDANSETRON 2 MG/ML
4 INJECTION INTRAMUSCULAR; INTRAVENOUS EVERY 6 HOURS PRN
Status: DISCONTINUED | OUTPATIENT
Start: 2025-01-17 | End: 2025-01-19

## 2025-01-17 RX ORDER — HEPARIN SODIUM 10000 [USP'U]/100ML
12 INJECTION, SOLUTION INTRAVENOUS
Status: DISCONTINUED | OUTPATIENT
Start: 2025-01-17 | End: 2025-01-20

## 2025-01-17 RX ORDER — SODIUM CHLORIDE, SODIUM LACTATE, POTASSIUM CHLORIDE, CALCIUM CHLORIDE 600; 310; 30; 20 MG/100ML; MG/100ML; MG/100ML; MG/100ML
50 INJECTION, SOLUTION INTRAVENOUS CONTINUOUS
Status: DISCONTINUED | OUTPATIENT
Start: 2025-01-17 | End: 2025-01-20

## 2025-01-17 RX ORDER — IBUPROFEN 600 MG/1
1 TABLET ORAL
Status: DISCONTINUED | OUTPATIENT
Start: 2025-01-17 | End: 2025-01-20 | Stop reason: HOSPADM

## 2025-01-17 RX ORDER — NICOTINE POLACRILEX 4 MG
15 LOZENGE BUCCAL
Status: DISCONTINUED | OUTPATIENT
Start: 2025-01-17 | End: 2025-01-20 | Stop reason: HOSPADM

## 2025-01-17 RX ORDER — SODIUM CHLORIDE 0.9 % (FLUSH) 0.9 %
10 SYRINGE (ML) INJECTION AS NEEDED
Status: DISCONTINUED | OUTPATIENT
Start: 2025-01-17 | End: 2025-01-20 | Stop reason: HOSPADM

## 2025-01-17 RX ORDER — POLYETHYLENE GLYCOL 3350 17 G/17G
17 POWDER, FOR SOLUTION ORAL DAILY PRN
Status: DISCONTINUED | OUTPATIENT
Start: 2025-01-17 | End: 2025-01-20 | Stop reason: HOSPADM

## 2025-01-17 RX ORDER — FAMOTIDINE 10 MG/ML
20 INJECTION, SOLUTION INTRAVENOUS ONCE
Status: COMPLETED | OUTPATIENT
Start: 2025-01-17 | End: 2025-01-17

## 2025-01-17 RX ADMIN — Medication 10 ML: at 21:35

## 2025-01-17 RX ADMIN — ONDANSETRON 4 MG: 2 INJECTION INTRAMUSCULAR; INTRAVENOUS at 15:48

## 2025-01-17 RX ADMIN — HEPARIN SODIUM 12 UNITS/KG/HR: 10000 INJECTION, SOLUTION INTRAVENOUS at 21:35

## 2025-01-17 RX ADMIN — MORPHINE SULFATE 4 MG: 4 INJECTION, SOLUTION INTRAMUSCULAR; INTRAVENOUS at 15:48

## 2025-01-17 RX ADMIN — SODIUM CHLORIDE 1000 ML: 9 INJECTION, SOLUTION INTRAVENOUS at 21:33

## 2025-01-17 RX ADMIN — NITROGLYCERIN 1 INCH: 20 OINTMENT TOPICAL at 18:56

## 2025-01-17 RX ADMIN — ONDANSETRON 4 MG: 2 INJECTION INTRAMUSCULAR; INTRAVENOUS at 21:32

## 2025-01-17 RX ADMIN — FAMOTIDINE 20 MG: 10 INJECTION INTRAVENOUS at 21:32

## 2025-01-17 NOTE — Clinical Note
A 7 fr sheath was successfully inserted into the right femoral artery. Sheath insertion not delayed.

## 2025-01-17 NOTE — ED PROVIDER NOTES
Time: 6:42 PM EST  Date of encounter:  1/17/2025  Independent Historian/Clinical History and Information was obtained by:   Patient    History is limited by: N/A    Chief Complaint: Chest discomfort      History of Present Illness:  Patient is a 79 y.o. year old female who presents to the emergency department for evaluation of chest discomfort.  This patient presents to the emergency department complaint of chest discomfort which started earlier today it was a deep chest pressure that went across her chest.  After several nitroglycerin along with morphine and aspirin the patient's symptoms have resolved.  The patient states that she does have a history of coronary disease and she also has a history of atrial paced rhythm with left ventricular hypertrophy      Patient Care Team  Primary Care Provider: Samy Leon Jr., MD    Past Medical History:     Allergies   Allergen Reactions    Codeine Nausea And Vomiting     Past Medical History:   Diagnosis Date    CAD (coronary artery disease)     Essential (primary) hypertension     Ex-cigarette smoker     Quit 1992    Lung cancer 2016    S/P Radiation and chemotherapy    Paroxysmal atrial fibrillation     Sick sinus syndrome 04/2024    Stroke 11/04/2023    Type 2 diabetes mellitus      Past Surgical History:   Procedure Laterality Date    CARDIAC CATHETERIZATION N/A 01/31/2024    Procedure: Right and Left Heart Cath;  Surgeon: Kath Smith MD;  Location: [facility] CATH INVASIVE LOCATION;  Service: Cardiovascular;  Laterality: N/A;    CARDIAC CATHETERIZATION N/A 01/31/2024    Procedure: Right Heart Cath;  Surgeon: Kath Smith MD;  Location:  [facility] CATH INVASIVE LOCATION;  Service: Cardiovascular;  Laterality: N/A;    CARDIAC CATHETERIZATION N/A 01/31/2024    Procedure: Left ventriculography;  Surgeon: Kath Smith MD;  Location:  [facility] CATH INVASIVE LOCATION;  Service: Cardiovascular;  Laterality: N/A;    CARDIAC ELECTROPHYSIOLOGY PROCEDURE  N/A 10/18/2023    Procedure: Loop insertion- [place];  Surgeon: Kath Smith MD;  Location:  [facility] CATH INVASIVE LOCATION;  Service: Cardiovascular;  Laterality: N/A;    CARDIAC ELECTROPHYSIOLOGY PROCEDURE N/A 4/24/2024    Procedure: Pacemaker DC new biotronik;  Surgeon: Kath Smith MD;  Location:  [facility] CATH INVASIVE LOCATION;  Service: Cardiovascular;  Laterality: N/A;  BIOTRONIK    CARDIAC ELECTROPHYSIOLOGY PROCEDURE N/A 4/24/2024    Procedure: Loop recorder removal;  Surgeon: Kath Smith MD;  Location:  [facility] CATH INVASIVE LOCATION;  Service: Cardiovascular;  Laterality: N/A;    CATARACT EXTRACTION Bilateral     CORONARY STENT PLACEMENT      2002 and 2007 stent placement in [place], [place]    VAGINAL HYSTERECTOMY      1970's     Family History   Problem Relation Age of Onset    Heart disease Mother     Heart disease Father     Heart disease Brother        Home Medications:  Prior to Admission medications    Medication Sig Start Date End Date Taking? Authorizing Provider   alendronate (FOSAMAX) 70 MG tablet Take 1 tablet by mouth Every 7 (Seven) Days. Tuesday 8/11/23   Sharon Barajas MD   apixaban (ELIQUIS) 5 MG tablet tablet Take 1 tablet by mouth Every 12 (Twelve) Hours. Indications: Atrial Fibrillation 1/3/25   Kath Smith MD   atorvastatin (LIPITOR) 40 MG tablet Take 1 tablet by mouth Every Night. 9/19/23   Sharon Barajas MD   bisoprolol (ZEBeta) 5 MG tablet Take 1 tablet by mouth Daily.    Sharon Barajas MD   Farxiga 10 MG tablet take 1 tablet (10 mg) by mouth once daily in the morning  Patient not taking: Reported on 12/9/2024 1/8/24   Sharon Barajas MD   glipizide (GLUCOTROL XL) 10 MG 24 hr tablet 2 (Two) Times a Day. 9/19/23   Sharon Barajas MD   omeprazole (priLOSEC) 20 MG capsule 1 capsule Every Night. 9/19/23   Sharon Barajas MD   pioglitazone (ACTOS) 30 MG tablet Take 1 tablet by mouth Daily. 8/6/23   Jenn  "MD Sharon   potassium chloride 10 MEQ CR tablet Take 1 tablet by mouth Daily. 2/20/24   Kath Smith MD   solifenacin (VESICARE) 5 MG tablet Take  by mouth Daily.  Patient not taking: Reported on 12/9/2024    ProviderSharon MD        Social History:   Social History     Tobacco Use    Smoking status: Former     Types: Cigarettes    Smokeless tobacco: Never   Vaping Use    Vaping status: Never Used   Substance Use Topics    Alcohol use: Not Currently    Drug use: Never         Review of Systems:  Review of Systems   Constitutional:  Negative for chills and fever.   HENT:  Negative for congestion, ear pain and sore throat.    Eyes:  Negative for pain.   Respiratory:  Negative for cough, chest tightness and shortness of breath.    Cardiovascular:  Positive for chest pain.   Gastrointestinal:  Negative for abdominal pain, diarrhea, nausea and vomiting.   Genitourinary:  Negative for flank pain and hematuria.   Musculoskeletal:  Negative for joint swelling.   Skin:  Negative for pallor.   Neurological:  Negative for seizures and headaches.   All other systems reviewed and are negative.       Physical Exam:  /67 (BP Location: Right arm, Patient Position: Lying)   Pulse 65   Temp 98.2 °F (36.8 °C) (Oral)   Resp 16   Ht 157.5 cm (62\")   Wt 71.3 kg (157 lb 3 oz)   SpO2 96%   BMI 28.75 kg/m²     Physical Exam  Vitals and nursing note reviewed.   Constitutional:       General: She is not in acute distress.     Appearance: Normal appearance. She is not toxic-appearing.   HENT:      Head: Normocephalic and atraumatic.      Mouth/Throat:      Mouth: Mucous membranes are moist.   Eyes:      General: No scleral icterus.  Cardiovascular:      Rate and Rhythm: Normal rate and regular rhythm.      Pulses: Normal pulses.      Heart sounds: Normal heart sounds.   Pulmonary:      Effort: Pulmonary effort is normal. No respiratory distress.      Breath sounds: Normal breath sounds.   Abdominal:      " General: Abdomen is flat.      Palpations: Abdomen is soft.      Tenderness: There is no abdominal tenderness.   Musculoskeletal:         General: Normal range of motion.      Cervical back: Normal range of motion and neck supple.   Skin:     General: Skin is warm and dry.      Capillary Refill: Capillary refill takes less than 2 seconds.   Neurological:      General: No focal deficit present.      Mental Status: She is alert and oriented to person, place, and time. Mental status is at baseline.                    Medical Decision Making:      Comorbidities that affect care:    Chronic Kidney Disease, Coronary Artery Disease    External Notes reviewed:    Previous Clinic Note: Cardiology clinic note      The following orders were placed and all results were independently analyzed by me:  Orders Placed This Encounter   Procedures    XR Chest 1 View    [place] Draw    High Sensitivity Troponin T    Comprehensive Metabolic Panel    Lipase    BNP    Magnesium    CBC Auto Differential    High Sensitivity Troponin T 1Hr    Protime-INR    aPTT    aPTT    Basic Metabolic Panel    High Sensitivity Troponin T    TSH Rfx On Abnormal To Free T4    Lipid Panel    Hemoglobin A1c    aPTT    CBC Auto Differential    NPO Diet NPO Type: Strict NPO    Undress & Gown    Continuous Pulse Oximetry    Notify Provider Platelet Count Less Than 78155    Notify Provider if PTT Not in Therapeutic Range After 24 Hours    Stop Infusion & Notify Provider if Bleeding Occurs    RN To Release aPTT Order 6 Hours After Heparin Bolus & 6 Hours After Any Heparin Rate Change    After 2 Consecutive Therapeutic aPTTs, Obtain aPTT Daily.  If a Rate Adjustment is Necessary, Resume Every 6 Hour aPTT Draws    Vital Signs    Intake & Output    Weigh Patient    Oral Care    Saline Lock & Maintain IV Access    Code Status and Medical Interventions: CPR (Attempt to Resuscitate); Full Support    Cardiology (on-call MD unless specified)    Hospitalist (on-call MD  unless specified)    Oxygen Therapy- Nasal Cannula; Titrate 1-6 LPM Per SpO2; 90 - 95%    POC Glucose 4x Daily Before Meals & at Bedtime    ECG 12 Lead ED Triage Standing Order; Chest Pain    ECG 12 Lead ED Triage Standing Order; Chest Pain    Adult Transthoracic Echo Complete W/ Cont if Necessary Per Protocol    Insert Peripheral IV    Insert Peripheral IV    Inpatient Admission    CBC & Differential    Green Top (Gel)    Lavender Top    Gold Top - SST    Light Blue Top    CBC & Differential       Medications Given in the Emergency Department:  Medications   sodium chloride 0.9 % flush 10 mL (has no administration in time range)   heparin 19197 units/250 mL (100 units/mL) in 0.45 % NaCl infusion (12 Units/kg/hr × 70.9 kg Intravenous New Bag 1/18/25 0019)   heparin bolus from bag solution 3,600 Units (has no administration in time range)   heparin bolus from bag solution 1,800 Units (has no administration in time range)   sodium chloride 0.9 % flush 10 mL (10 mL Intravenous Given 1/17/25 2135)   sodium chloride 0.9 % flush 10 mL (10 mL Intravenous Given 1/17/25 2135)   sodium chloride 0.9 % infusion 40 mL (has no administration in time range)   sennosides-docusate (PERICOLACE) 8.6-50 MG per tablet 2 tablet (has no administration in time range)     And   polyethylene glycol (MIRALAX) packet 17 g (has no administration in time range)     And   bisacodyl (DULCOLAX) EC tablet 5 mg (has no administration in time range)     And   bisacodyl (DULCOLAX) suppository 10 mg (has no administration in time range)   aspirin chewable tablet 81 mg (has no administration in time range)   hydrALAZINE (APRESOLINE) injection 10 mg (has no administration in time range)   ondansetron (ZOFRAN) injection 4 mg (has no administration in time range)   acetaminophen (TYLENOL) tablet 650 mg (has no administration in time range)   atorvastatin (LIPITOR) tablet 40 mg (has no administration in time range)   bisoprolol (ZEBeta) tablet 5 mg (has no  administration in time range)   pantoprazole (PROTONIX) EC tablet 40 mg (has no administration in time range)   dextrose (GLUTOSE) oral gel 15 g (has no administration in time range)   dextrose (D50W) (25 g/50 mL) IV injection 25 g (has no administration in time range)   glucagon (GLUCAGEN) injection 1 mg (has no administration in time range)   insulin regular (humuLIN R,novoLIN R) injection 2-7 Units (2 Units Subcutaneous Given 1/18/25 0019)   lactated ringers infusion (50 mL/hr Intravenous New Bag 1/18/25 0018)   morphine injection 4 mg (4 mg Intravenous Given 1/17/25 1548)   ondansetron (ZOFRAN) injection 4 mg (4 mg Intravenous Given 1/17/25 1548)   nitroglycerin (NITROSTAT) ointment 1 inch (1 inch Topical Given 1/17/25 1856)   sodium chloride 0.9 % bolus 1,000 mL (1,000 mL Intravenous New Bag 1/17/25 2133)   heparin bolus from bag solution 4,300 Units (4,300 Units Intravenous Bolus from Bag 1/17/25 2143)   ondansetron (ZOFRAN) injection 4 mg (4 mg Intravenous Given 1/17/25 2132)   famotidine (PEPCID) injection 20 mg (20 mg Intravenous Given 1/17/25 2132)        ED Course:       EKG: Atrial paced rhythm with a rate of 63 bpm  Left ventricular hypertrophy  No acute ischemia.    Labs:    Lab Results (last 24 hours)       Procedure Component Value Units Date/Time    CBC & Differential [[id number]]  (Abnormal) Collected: 01/17/25 1453    Specimen: Blood Updated: 01/17/25 1721    Narrative:      The following orders were created for panel order CBC & Differential.  Procedure                               Abnormality         Status                     ---------                               -----------         ------                     CBC Auto Differential[[id number]]        Abnormal            Final result                 Please view results for these tests on the individual orders.    CBC Auto Differential [[id number]]  (Abnormal) Collected: 01/17/25 1453    Specimen: Blood Updated: 01/17/25 1721     WBC 6.04 10*3/mm3       RBC 4.41 10*6/mm3      Hemoglobin 12.6 g/dL      Hematocrit 40.6 %      MCV 92.1 fL      MCH 28.6 pg      MCHC 31.0 g/dL      RDW 15.8 %      RDW-SD 53.7 fl      MPV 11.8 fL      Platelets 169 10*3/mm3      Neutrophil % 63.5 %      Lymphocyte % 26.5 %      Monocyte % 7.5 %      Eosinophil % 1.7 %      Basophil % 0.5 %      Immature Grans % 0.3 %      Neutrophils, Absolute 3.84 10*3/mm3      Lymphocytes, Absolute 1.60 10*3/mm3      Monocytes, Absolute 0.45 10*3/mm3      Eosinophils, Absolute 0.10 10*3/mm3      Basophils, Absolute 0.03 10*3/mm3      Immature Grans, Absolute 0.02 10*3/mm3      nRBC 0.0 /100 WBC     Hemoglobin A1c [[id number]] Collected: 01/17/25 1453    Specimen: Blood Updated: 01/17/25 2114    BNP [[id number]]  (Normal) Collected: 01/17/25 1511    Specimen: Blood Updated: 01/17/25 1626     proBNP 1,748.0 pg/mL     Narrative:      This assay is used as an aid in the diagnosis of individuals suspected of having heart failure. It can be used as an aid in the diagnosis of acute decompensated heart failure (ADHF) in patients presenting with signs and symptoms of ADHF to the emergency department (ED). In addition, NT-proBNP of <300 pg/mL indicates ADHF is not likely.    Age Range Result Interpretation  NT-proBNP Concentration (pg/mL:      <50             Positive            >450                   Gray                 300-450                    Negative             <300    50-75           Positive            >900                  Gray                300-900                  Negative            <300      >75             Positive            >1800                  Gray                300-1800                  Negative            <300    Magnesium [[id number]]  (Normal) Collected: 01/17/25 1511    Specimen: Blood Updated: 01/17/25 1630     Magnesium 1.8 mg/dL     High Sensitivity Troponin T [[id number]]  (Abnormal) Collected: 01/17/25 1845    Specimen: Blood from Arm, Right Updated: 01/17/25 1931     HS  Troponin T 823 ng/L     Narrative:      High Sensitive Troponin T Reference Range:  <14.0 ng/L- Negative Female for AMI  <22.0 ng/L- Negative Male for AMI  >=14 - Abnormal Female indicating possible myocardial injury.  >=22 - Abnormal Male indicating possible myocardial injury.   Clinicians would have to utilize clinical acumen, EKG, Troponin, and serial changes to determine if it is an Acute Myocardial Infarction or myocardial injury due to an underlying chronic condition.         Comprehensive Metabolic Panel [[id number]]  (Abnormal) Collected: 01/17/25 1845    Specimen: Blood from Arm, Right Updated: 01/17/25 1925     Glucose 235 mg/dL      BUN 46 mg/dL      Creatinine 1.66 mg/dL      Sodium 138 mmol/L      Potassium 4.8 mmol/L      Comment: Slight hemolysis detected by analyzer. Result may be falsely elevated.        Chloride 105 mmol/L      CO2 21.1 mmol/L      Calcium 9.7 mg/dL      Total Protein 6.6 g/dL      Albumin 3.4 g/dL      ALT (SGPT) 13 U/L      AST (SGOT) 55 U/L      Comment: Slight hemolysis detected by analyzer. Result may be falsely elevated.        Alkaline Phosphatase 68 U/L      Total Bilirubin 0.7 mg/dL      Globulin 3.2 gm/dL      A/G Ratio 1.1 g/dL      BUN/Creatinine Ratio 27.7     Anion Gap 11.9 mmol/L      eGFR 31.1 mL/min/1.73     Narrative:      GFR Categories in Chronic Kidney Disease (CKD)      GFR Category          GFR (mL/min/1.73)    Interpretation  G1                     90 or greater         Normal or high (1)  G2                      60-89                Mild decrease (1)  G3a                   45-59                Mild to moderate decrease  G3b                   30-44                Moderate to severe decrease  G4                    15-29                Severe decrease  G5                    14 or less           Kidney failure          (1)In the absence of evidence of kidney disease, neither GFR category G1 or G2 fulfill the criteria for CKD.    eGFR calculation 2021 CKD-EPI  creatinine equation, which does not include race as a factor    Lipase [[id number]]  (Normal) Collected: 01/17/25 1845    Specimen: Blood from Arm, Right Updated: 01/17/25 1917     Lipase 27 U/L     Protime-INR [[id number]]  (Abnormal) Collected: 01/17/25 2107    Specimen: Blood Updated: 01/17/25 2137     Protime 15.2 Seconds      INR 1.18    Narrative:      Suggested Therapeutic Ranges For Oral Anticoagulant Therapy:  Level of Therapy                      INR Target Range  Standard Dose                            2.0-3.0  High Dose                                2.5-3.5  Patients not receiving anticoagulant  Therapy Normal Range                     0.86-1.15    aPTT [[id number]]  (Abnormal) Collected: 01/17/25 2107    Specimen: Blood Updated: 01/17/25 2137     PTT 32.3 seconds     High Sensitivity Troponin T 1Hr [[id number]]  (Abnormal) Collected: 01/17/25 2132    Specimen: Blood Updated: 01/17/25 2222     HS Troponin T 1,511 ng/L      Troponin T Numeric Delta 688 ng/L      Troponin T % Delta 84    Narrative:      High Sensitive Troponin T Reference Range:  <14.0 ng/L- Negative Female for AMI  <22.0 ng/L- Negative Male for AMI  >=14 - Abnormal Female indicating possible myocardial injury.  >=22 - Abnormal Male indicating possible myocardial injury.   Clinicians would have to utilize clinical acumen, EKG, Troponin, and serial changes to determine if it is an Acute Myocardial Infarction or myocardial injury due to an underlying chronic condition.         TSH Rfx On Abnormal To Free T4 [[id number]]  (Normal) Collected: 01/17/25 2132    Specimen: Blood Updated: 01/17/25 2201     TSH 3.340 uIU/mL     Lipid Panel [[id number]] Collected: 01/17/25 2132    Specimen: Blood Updated: 01/17/25 2155     Total Cholesterol 156 mg/dL      Triglycerides 113 mg/dL      HDL Cholesterol 45 mg/dL      LDL Cholesterol  91 mg/dL      VLDL Cholesterol 20 mg/dL      LDL/HDL Ratio 1.96    Narrative:      Cholesterol Reference Ranges  (U.S[place]  Department of Health and Human Services ATP III Classifications)    Desirable          <200 mg/dL  Borderline High    200-239 mg/dL  High Risk          >240 mg/dL      Triglyceride Reference Ranges  (U.S. Department of Health and Human Services ATP III Classifications)    Normal           <150 mg/dL  Borderline High  150-199 mg/dL  High             200-499 mg/dL  Very High        >500 mg/dL    HDL Reference Ranges  (U.S. Department of Health and Human Services ATP III Classifications)    Low     <40 mg/dl (major risk factor for CHD)  High    >60 mg/dl ('negative' risk factor for CHD)        LDL Reference Ranges  (U.S. Department of Health and Human Services ATP III Classifications)    Optimal          <100 mg/dL  Near Optimal     100-129 mg/dL  Borderline High  130-159 mg/dL  High             160-189 mg/dL  Very High        >189 mg/dL    Basic Metabolic Panel [[id number]] Updated: 01/18/25 0322    Specimen: Blood from Arm, Left     High Sensitivity Troponin T [[id number]] Updated: 01/18/25 0322    Specimen: Blood from Arm, Left     POC Glucose 4x Daily Before Meals & at Bedtime [[id number]]  (Abnormal) Collected: 01/17/25 2359    Specimen: Blood Updated: 01/18/25 0003     Glucose 174 mg/dL      Comment: Serial Number: [id number]:  [id number]       aPTT [[id number]] Collected: 01/18/25 0308    Specimen: Blood from Arm, Left Updated: 01/18/25 0313    CBC & Differential [[id number]]  (Abnormal) Collected: 01/18/25 0308    Specimen: Blood from Arm, Left Updated: 01/18/25 0323    Narrative:      The following orders were created for panel order CBC & Differential.  Procedure                               Abnormality         Status                     ---------                               -----------         ------                     CBC Auto Differential[[id number]]        Abnormal            Final result                 Please view results for these tests on the individual orders.    CBC Auto Differential [[id number]]   (Abnormal) Collected: 01/18/25 0308    Specimen: Blood from Arm, Left Updated: 01/18/25 0323     WBC 7.00 10*3/mm3      RBC 3.71 10*6/mm3      Hemoglobin 10.5 g/dL      Hematocrit 33.8 %      MCV 91.1 fL      MCH 28.3 pg      MCHC 31.1 g/dL      RDW 15.8 %      RDW-SD 52.7 fl      MPV 11.0 fL      Platelets 167 10*3/mm3      Neutrophil % 74.0 %      Lymphocyte % 17.1 %      Monocyte % 8.0 %      Eosinophil % 0.3 %      Basophil % 0.3 %      Immature Grans % 0.3 %      Neutrophils, Absolute 5.18 10*3/mm3      Lymphocytes, Absolute 1.20 10*3/mm3      Monocytes, Absolute 0.56 10*3/mm3      Eosinophils, Absolute 0.02 10*3/mm3      Basophils, Absolute 0.02 10*3/mm3      Immature Grans, Absolute 0.02 10*3/mm3      nRBC 0.0 /100 WBC              Imaging:    XR Chest 1 View    Result Date: 1/17/2025  XR CHEST 1 VW Date of Exam: 1/17/2025 3:06 PM EST Indication: Chest Pain Triage Protocol Comparison: Chest AP dated 4/24/2024 from [facility] Findings: There is consolidation in the left lung base, new in the interval. The right lung is clear. The cardiac and mediastinal silhouettes appear normal. A dual-lead transvenous pacemaker remains in good position.     Impression: Interval development of dense consolidation in the left lung base, possibly representing atelectasis or pneumonia. A small to moderate left pleural effusion is not excluded. A follow-up radiograph in 3-4 weeks is suggested to reevaluate. If the density persists, consider chest CT to further evaluate. Electronically Signed: Maurisio Horn MD  1/17/2025 3:29 PM EST  Workstation ID: [id number]       Differential Diagnosis and Discussion:    Chest Pain:  Based on the patient's signs and symptoms, I considered aortic dissection, myocardial infaction, pulmonary embolism, cardiac tamponade, pericarditis, pneumothorax, musculoskeletal chest pain and other differential diagnosis as an etiology of the patient's chest pain.     PROCEDURES:    Labs were  collected in the emergency department and all labs were reviewed and interpreted by me.  X-ray were performed in the emergency department and all X-ray impressions were independently interpreted by me.  An EKG was performed and the EKG was interpreted by me.    ECG 12 Lead ED Triage Standing Order; Chest Pain   Preliminary Result   HEART RATE=63  bpm   RR Qeuuhnrz=525  ms   DC Interval=82  ms   P Horizontal Axis=-56  deg   P Front Axis=  deg   QRSD Gluolypg=049  ms   QT Kcmsqwht=174  ms   BFsU=006  ms   QRS Axis=-43  deg   T Wave Axis=133  deg   - ABNORMAL ECG -   Atrial-paced rhythm   Probable LVH with secondary repol abnrm   Inferior infarct, old   Anterior Q waves, possibly due to LVH   Date and Time of Study:2025-01-17 15:03:43          Procedures    MDM           Total Critical Care time of 55 minutes. Total critical care time documented does not include time spent on separately billed procedures for services of nurses or physician assistants. I personally saw and examined the patient. I have reviewed all diagnostic interpretations and treatment plans as written. I was present for the key portions of any procedures performed and the inclusive time noted in any critical care statement. Critical care time includes patient management by me, time spent at the patients bedside,  time to review lab and imaging results, discussing patient care, documentation in the medical record, and time spent with family or caregiver.          Patient Care Considerations:    CT CHEST: I considered ordering a CT scan of the chest, however patient has signs of angina.      Consultants/Shared Management Plan:    Hospitalist: I have discussed the case with hospitalist who agrees to accept the patient for admission.  Consultant: I have discussed the case with Dr. Damon who agrees to consult on the patient.    Social Determinants of Health:    Patient is unable to carry out activities of daily life. Escalation of care is necessary.        Disposition and Care Coordination:    Admit:   Through independent evaluation of the patient's history, physical, and imperical data, the patient meets criteria for inpatient admission to the hospital.        Final diagnoses:   Chest pain, unspecified type   Non-STEMI (non-ST elevated myocardial infarction)        ED Disposition       ED Disposition   Decision to Admit    Condition   --    Comment   Level of Care: Telemetry [5]   Diagnosis: NSTEMI (non-ST elevated myocardial infarction) [664815]   Admitting Physician: LIZETH DUONG [[id number]]   Certification: I Certify That Inpatient Hospital Services Are Medically Necessary For Greater Than 2 Midnights                 This medical record created using voice recognition software.             Osman Grimes, DO  01/18/25 0402

## 2025-01-17 NOTE — ED NOTES
This RN has drawn blood twice with the CMP, trop, and lipase hemolyzed. The ER tech straight stuck her and wasn't able to get blood. Another RN attempted to get blood but wasn't successful either. Lab was called and the  from triage stuck her again which also hemolyzed. Dr. Grimes is aware of this. FRANCISCO and Umberto FOSTER Also made aware.

## 2025-01-18 ENCOUNTER — APPOINTMENT (OUTPATIENT)
Dept: CT IMAGING | Facility: HOSPITAL | Age: 80
DRG: 321 | End: 2025-01-18
Payer: MEDICARE

## 2025-01-18 LAB
ANION GAP SERPL CALCULATED.3IONS-SCNC: 10.5 MMOL/L (ref 5–15)
APTT PPP: 53.8 SECONDS (ref 78–95.9)
APTT PPP: >200 SECONDS (ref 78–95.9)
APTT PPP: >200 SECONDS (ref 78–95.9)
APTT PPP: NORMAL S
AV MEAN PRESS GRAD SYS DOP V1V2: 2 MMHG
AV VMAX SYS DOP: 95.5 CM/SEC
BACTERIA UR QL AUTO: ABNORMAL /HPF
BASOPHILS # BLD AUTO: 0.02 10*3/MM3 (ref 0–0.2)
BASOPHILS NFR BLD AUTO: 0.3 % (ref 0–1.5)
BH CV ECHO MEAS - AO MAX PG: 3.6 MMHG
BH CV ECHO MEAS - AO ROOT DIAM: 3.1 CM
BH CV ECHO MEAS - AO V2 VTI: 19.8 CM
BH CV ECHO MEAS - AVA(I,D): 2.6 CM2
BH CV ECHO MEAS - EDV(CUBED): 125 ML
BH CV ECHO MEAS - EDV(MOD-SP2): 61.3 ML
BH CV ECHO MEAS - EDV(MOD-SP4): 69.1 ML
BH CV ECHO MEAS - EF(MOD-SP2): 44 %
BH CV ECHO MEAS - EF(MOD-SP4): 60.9 %
BH CV ECHO MEAS - ESV(CUBED): 46.7 ML
BH CV ECHO MEAS - ESV(MOD-SP2): 34.3 ML
BH CV ECHO MEAS - ESV(MOD-SP4): 27 ML
BH CV ECHO MEAS - FS: 28 %
BH CV ECHO MEAS - IVS/LVPW: 1.1 CM
BH CV ECHO MEAS - IVSD: 1.1 CM
BH CV ECHO MEAS - LA DIMENSION: 3.9 CM
BH CV ECHO MEAS - LAT PEAK E' VEL: 7.3 CM/SEC
BH CV ECHO MEAS - LV MASS(C)D: 194.4 GRAMS
BH CV ECHO MEAS - LV MAX PG: 2.5 MMHG
BH CV ECHO MEAS - LV MEAN PG: 1 MMHG
BH CV ECHO MEAS - LV V1 MAX: 79.1 CM/SEC
BH CV ECHO MEAS - LV V1 VTI: 16.7 CM
BH CV ECHO MEAS - LVIDD: 5 CM
BH CV ECHO MEAS - LVIDS: 3.6 CM
BH CV ECHO MEAS - LVOT AREA: 3.1 CM2
BH CV ECHO MEAS - LVOT DIAM: 2 CM
BH CV ECHO MEAS - LVPWD: 1 CM
BH CV ECHO MEAS - MED PEAK E' VEL: 8.5 CM/SEC
BH CV ECHO MEAS - MV A MAX VEL: 77 CM/SEC
BH CV ECHO MEAS - MV DEC SLOPE: 406 CM/SEC2
BH CV ECHO MEAS - MV DEC TIME: 0.2 SEC
BH CV ECHO MEAS - MV E MAX VEL: 82.8 CM/SEC
BH CV ECHO MEAS - MV E/A: 1.08
BH CV ECHO MEAS - MV MEAN PG: 2 MMHG
BH CV ECHO MEAS - MV V2 VTI: 19.2 CM
BH CV ECHO MEAS - MVA(VTI): 2.7 CM2
BH CV ECHO MEAS - RVDD: 2.3 CM
BH CV ECHO MEAS - SV(LVOT): 52.5 ML
BH CV ECHO MEAS - SV(MOD-SP2): 27 ML
BH CV ECHO MEAS - SV(MOD-SP4): 42.1 ML
BH CV ECHO MEAS - TAPSE (>1.6): 1.96 CM
BH CV ECHO MEAS - TR MAX PG: 36.5 MMHG
BH CV ECHO MEAS - TR MAX VEL: 302 CM/SEC
BH CV ECHO MEASUREMENTS AVERAGE E/E' RATIO: 10.48
BILIRUB UR QL STRIP: NEGATIVE
BUN SERPL-MCNC: 39 MG/DL (ref 8–23)
BUN/CREAT SERPL: 27.1 (ref 7–25)
CALCIUM SPEC-SCNC: 8.6 MG/DL (ref 8.6–10.5)
CHLORIDE SERPL-SCNC: 107 MMOL/L (ref 98–107)
CLARITY UR: ABNORMAL
CO2 SERPL-SCNC: 18.5 MMOL/L (ref 22–29)
COLOR UR: YELLOW
CREAT SERPL-MCNC: 1.44 MG/DL (ref 0.57–1)
D-LACTATE SERPL-SCNC: 0.9 MMOL/L (ref 0.5–2)
DEPRECATED RDW RBC AUTO: 52.7 FL (ref 37–54)
EGFRCR SERPLBLD CKD-EPI 2021: 36.8 ML/MIN/1.73
EOSINOPHIL # BLD AUTO: 0.02 10*3/MM3 (ref 0–0.4)
EOSINOPHIL NFR BLD AUTO: 0.3 % (ref 0.3–6.2)
ERYTHROCYTE [DISTWIDTH] IN BLOOD BY AUTOMATED COUNT: 15.8 % (ref 12.3–15.4)
GLUCOSE BLDC GLUCOMTR-MCNC: 147 MG/DL (ref 70–99)
GLUCOSE BLDC GLUCOMTR-MCNC: 174 MG/DL (ref 70–99)
GLUCOSE BLDC GLUCOMTR-MCNC: 195 MG/DL (ref 70–99)
GLUCOSE BLDC GLUCOMTR-MCNC: 209 MG/DL (ref 70–99)
GLUCOSE BLDC GLUCOMTR-MCNC: 94 MG/DL (ref 70–99)
GLUCOSE SERPL-MCNC: 120 MG/DL (ref 65–99)
GLUCOSE UR STRIP-MCNC: ABNORMAL MG/DL
HBA1C MFR BLD: 10.2 % (ref 4.8–5.6)
HCT VFR BLD AUTO: 33.8 % (ref 34–46.6)
HGB BLD-MCNC: 10.5 G/DL (ref 12–15.9)
HGB UR QL STRIP.AUTO: ABNORMAL
HYALINE CASTS UR QL AUTO: ABNORMAL /LPF
IMM GRANULOCYTES # BLD AUTO: 0.02 10*3/MM3 (ref 0–0.05)
IMM GRANULOCYTES NFR BLD AUTO: 0.3 % (ref 0–0.5)
KETONES UR QL STRIP: ABNORMAL
LEFT ATRIUM VOLUME INDEX: 33.5 ML/M2
LEUKOCYTE ESTERASE UR QL STRIP.AUTO: ABNORMAL
LV EF BIPLANE MOD: 53.5 %
LYMPHOCYTES # BLD AUTO: 1.2 10*3/MM3 (ref 0.7–3.1)
LYMPHOCYTES NFR BLD AUTO: 17.1 % (ref 19.6–45.3)
MCH RBC QN AUTO: 28.3 PG (ref 26.6–33)
MCHC RBC AUTO-ENTMCNC: 31.1 G/DL (ref 31.5–35.7)
MCV RBC AUTO: 91.1 FL (ref 79–97)
MONOCYTES # BLD AUTO: 0.56 10*3/MM3 (ref 0.1–0.9)
MONOCYTES NFR BLD AUTO: 8 % (ref 5–12)
NEUTROPHILS NFR BLD AUTO: 5.18 10*3/MM3 (ref 1.7–7)
NEUTROPHILS NFR BLD AUTO: 74 % (ref 42.7–76)
NITRITE UR QL STRIP: POSITIVE
NRBC BLD AUTO-RTO: 0 /100 WBC (ref 0–0.2)
PH UR STRIP.AUTO: 5.5 [PH] (ref 5–8)
PLATELET # BLD AUTO: 167 10*3/MM3 (ref 140–450)
PMV BLD AUTO: 11 FL (ref 6–12)
POTASSIUM SERPL-SCNC: 4.3 MMOL/L (ref 3.5–5.2)
PROCALCITONIN SERPL-MCNC: 0.07 NG/ML (ref 0–0.25)
PROT UR QL STRIP: ABNORMAL
RBC # BLD AUTO: 3.71 10*6/MM3 (ref 3.77–5.28)
RBC # UR STRIP: ABNORMAL /HPF
REF LAB TEST METHOD: ABNORMAL
SODIUM SERPL-SCNC: 136 MMOL/L (ref 136–145)
SP GR UR STRIP: 1.02 (ref 1–1.03)
SQUAMOUS #/AREA URNS HPF: ABNORMAL /HPF
TROPONIN T SERPL HS-MCNC: 1821 NG/L
UROBILINOGEN UR QL STRIP: ABNORMAL
WBC # UR STRIP: ABNORMAL /HPF
WBC NRBC COR # BLD AUTO: 7 10*3/MM3 (ref 3.4–10.8)

## 2025-01-18 PROCEDURE — 80048 BASIC METABOLIC PNL TOTAL CA: CPT | Performed by: STUDENT IN AN ORGANIZED HEALTH CARE EDUCATION/TRAINING PROGRAM

## 2025-01-18 PROCEDURE — 63710000001 INSULIN LISPRO (HUMAN) PER 5 UNITS: Performed by: INTERNAL MEDICINE

## 2025-01-18 PROCEDURE — 85730 THROMBOPLASTIN TIME PARTIAL: CPT | Performed by: STUDENT IN AN ORGANIZED HEALTH CARE EDUCATION/TRAINING PROGRAM

## 2025-01-18 PROCEDURE — 82948 REAGENT STRIP/BLOOD GLUCOSE: CPT | Performed by: INTERNAL MEDICINE

## 2025-01-18 PROCEDURE — 85025 COMPLETE CBC W/AUTO DIFF WBC: CPT | Performed by: EMERGENCY MEDICINE

## 2025-01-18 PROCEDURE — 84484 ASSAY OF TROPONIN QUANT: CPT | Performed by: STUDENT IN AN ORGANIZED HEALTH CARE EDUCATION/TRAINING PROGRAM

## 2025-01-18 PROCEDURE — 83605 ASSAY OF LACTIC ACID: CPT | Performed by: INTERNAL MEDICINE

## 2025-01-18 PROCEDURE — 84145 PROCALCITONIN (PCT): CPT | Performed by: INTERNAL MEDICINE

## 2025-01-18 PROCEDURE — 87449 NOS EACH ORGANISM AG IA: CPT | Performed by: INTERNAL MEDICINE

## 2025-01-18 PROCEDURE — 82948 REAGENT STRIP/BLOOD GLUCOSE: CPT

## 2025-01-18 PROCEDURE — 25810000003 LACTATED RINGERS PER 1000 ML: Performed by: INTERNAL MEDICINE

## 2025-01-18 PROCEDURE — 25010000002 HEPARIN (PORCINE) 25000-0.45 UT/250ML-% SOLUTION: Performed by: INTERNAL MEDICINE

## 2025-01-18 PROCEDURE — 25810000003 LACTATED RINGERS PER 1000 ML: Performed by: STUDENT IN AN ORGANIZED HEALTH CARE EDUCATION/TRAINING PROGRAM

## 2025-01-18 PROCEDURE — 87899 AGENT NOS ASSAY W/OPTIC: CPT | Performed by: INTERNAL MEDICINE

## 2025-01-18 PROCEDURE — 87086 URINE CULTURE/COLONY COUNT: CPT | Performed by: INTERNAL MEDICINE

## 2025-01-18 PROCEDURE — 99233 SBSQ HOSP IP/OBS HIGH 50: CPT | Performed by: INTERNAL MEDICINE

## 2025-01-18 PROCEDURE — 63710000001 INSULIN REGULAR HUMAN PER 5 UNITS: Performed by: STUDENT IN AN ORGANIZED HEALTH CARE EDUCATION/TRAINING PROGRAM

## 2025-01-18 PROCEDURE — 81001 URINALYSIS AUTO W/SCOPE: CPT | Performed by: INTERNAL MEDICINE

## 2025-01-18 PROCEDURE — 25010000002 HEPARIN (PORCINE) 25000-0.45 UT/250ML-% SOLUTION: Performed by: EMERGENCY MEDICINE

## 2025-01-18 PROCEDURE — 25010000002 CEFTRIAXONE PER 250 MG: Performed by: INTERNAL MEDICINE

## 2025-01-18 PROCEDURE — 82948 REAGENT STRIP/BLOOD GLUCOSE: CPT | Performed by: STUDENT IN AN ORGANIZED HEALTH CARE EDUCATION/TRAINING PROGRAM

## 2025-01-18 PROCEDURE — 71250 CT THORAX DX C-: CPT

## 2025-01-18 PROCEDURE — 85730 THROMBOPLASTIN TIME PARTIAL: CPT | Performed by: INTERNAL MEDICINE

## 2025-01-18 PROCEDURE — 99222 1ST HOSP IP/OBS MODERATE 55: CPT | Performed by: INTERNAL MEDICINE

## 2025-01-18 RX ORDER — PANTOPRAZOLE SODIUM 40 MG/1
40 TABLET, DELAYED RELEASE ORAL
Status: DISCONTINUED | OUTPATIENT
Start: 2025-01-18 | End: 2025-01-20 | Stop reason: HOSPADM

## 2025-01-18 RX ORDER — NITROGLYCERIN 0.4 MG/1
TABLET SUBLINGUAL
Status: COMPLETED
Start: 2025-01-18 | End: 2025-01-18

## 2025-01-18 RX ORDER — INSULIN LISPRO 100 [IU]/ML
2-7 INJECTION, SOLUTION INTRAVENOUS; SUBCUTANEOUS
Status: DISCONTINUED | OUTPATIENT
Start: 2025-01-18 | End: 2025-01-20 | Stop reason: HOSPADM

## 2025-01-18 RX ORDER — MORPHINE SULFATE 2 MG/ML
2 INJECTION, SOLUTION INTRAMUSCULAR; INTRAVENOUS EVERY 4 HOURS PRN
Status: DISCONTINUED | OUTPATIENT
Start: 2025-01-18 | End: 2025-01-20 | Stop reason: HOSPADM

## 2025-01-18 RX ORDER — IPRATROPIUM BROMIDE AND ALBUTEROL SULFATE 2.5; .5 MG/3ML; MG/3ML
3 SOLUTION RESPIRATORY (INHALATION) EVERY 4 HOURS PRN
Status: DISCONTINUED | OUTPATIENT
Start: 2025-01-18 | End: 2025-01-20 | Stop reason: HOSPADM

## 2025-01-18 RX ORDER — NITROGLYCERIN 0.4 MG/1
0.4 TABLET SUBLINGUAL
Status: DISCONTINUED | OUTPATIENT
Start: 2025-01-18 | End: 2025-01-20 | Stop reason: HOSPADM

## 2025-01-18 RX ORDER — BISOPROLOL FUMARATE 5 MG/1
5 TABLET, FILM COATED ORAL DAILY
Status: DISCONTINUED | OUTPATIENT
Start: 2025-01-18 | End: 2025-01-20 | Stop reason: HOSPADM

## 2025-01-18 RX ORDER — ATORVASTATIN CALCIUM 40 MG/1
40 TABLET, FILM COATED ORAL NIGHTLY
Status: DISCONTINUED | OUTPATIENT
Start: 2025-01-18 | End: 2025-01-20 | Stop reason: HOSPADM

## 2025-01-18 RX ADMIN — ASPIRIN 81 MG: 81 TABLET, CHEWABLE ORAL at 09:12

## 2025-01-18 RX ADMIN — HEPARIN SODIUM 12 UNITS/KG/HR: 10000 INJECTION, SOLUTION INTRAVENOUS at 00:19

## 2025-01-18 RX ADMIN — Medication 10 ML: at 21:35

## 2025-01-18 RX ADMIN — NITROGLYCERIN 0.4 MG: 0.4 TABLET SUBLINGUAL at 09:33

## 2025-01-18 RX ADMIN — ATORVASTATIN CALCIUM 40 MG: 40 TABLET, FILM COATED ORAL at 05:42

## 2025-01-18 RX ADMIN — HEPARIN SODIUM 11 UNITS/KG/HR: 10000 INJECTION, SOLUTION INTRAVENOUS at 19:17

## 2025-01-18 RX ADMIN — INSULIN LISPRO 3 UNITS: 100 INJECTION, SOLUTION INTRAVENOUS; SUBCUTANEOUS at 16:50

## 2025-01-18 RX ADMIN — ATORVASTATIN CALCIUM 40 MG: 40 TABLET, FILM COATED ORAL at 21:34

## 2025-01-18 RX ADMIN — HEPARIN SODIUM 8 UNITS/KG/HR: 10000 INJECTION, SOLUTION INTRAVENOUS at 05:43

## 2025-01-18 RX ADMIN — NITROGLYCERIN 0.4 MG: 0.4 TABLET SUBLINGUAL at 13:22

## 2025-01-18 RX ADMIN — HEPARIN SODIUM 11 UNITS/KG/HR: 10000 INJECTION, SOLUTION INTRAVENOUS at 19:15

## 2025-01-18 RX ADMIN — NITROGLYCERIN 0.4 MG: 0.4 TABLET SUBLINGUAL at 13:01

## 2025-01-18 RX ADMIN — Medication 10 ML: at 09:13

## 2025-01-18 RX ADMIN — PANTOPRAZOLE SODIUM 40 MG: 40 TABLET, DELAYED RELEASE ORAL at 05:42

## 2025-01-18 RX ADMIN — INSULIN HUMAN 2 UNITS: 100 INJECTION, SOLUTION PARENTERAL at 00:19

## 2025-01-18 RX ADMIN — ACETAMINOPHEN 650 MG: 325 TABLET ORAL at 07:09

## 2025-01-18 RX ADMIN — SODIUM CHLORIDE, POTASSIUM CHLORIDE, SODIUM LACTATE AND CALCIUM CHLORIDE 50 ML/HR: 600; 310; 30; 20 INJECTION, SOLUTION INTRAVENOUS at 00:18

## 2025-01-18 RX ADMIN — BISOPROLOL FUMARATE 5 MG: 5 TABLET ORAL at 09:12

## 2025-01-18 RX ADMIN — INSULIN LISPRO 2 UNITS: 100 INJECTION, SOLUTION INTRAVENOUS; SUBCUTANEOUS at 21:34

## 2025-01-18 RX ADMIN — SODIUM CHLORIDE, POTASSIUM CHLORIDE, SODIUM LACTATE AND CALCIUM CHLORIDE 50 ML/HR: 600; 310; 30; 20 INJECTION, SOLUTION INTRAVENOUS at 19:24

## 2025-01-18 RX ADMIN — SODIUM CHLORIDE 1000 MG: 9 INJECTION INTRAMUSCULAR; INTRAVENOUS; SUBCUTANEOUS at 11:11

## 2025-01-18 NOTE — PLAN OF CARE
Goal Outcome Evaluation:  Plan of Care Reviewed With: patient        Progress: improving  Outcome Evaluation: This patient presents to the emergency department complaint of chest discomfort which started earlier today it was a deep chest pressure that went across her chest.  After several nitroglycerin along with morphine and aspirin the patient's symptoms have resolved.  The patient states that she does have a history of coronary disease and she also has a history of atrial paced rhythm with left ventricular hypertrophy.patient is alert and oriented x 4, paced on monitor, SATs in mid 90s on RA, BP and HR WNL. Right lung clear, left lung diminished in the bases.Patient has hx of left side stroke, right side deficit. Uses a cane to ambulate. Assist x 1. On Heparin drip.

## 2025-01-18 NOTE — NURSING NOTE
Lab called with aptt result critical >200.  Once verified with nursing staff on unit, heparin drip had not been held during lab collection for test therefore new collection of aptt entered for STAT.  Lab called and made aware of issue.

## 2025-01-18 NOTE — PLAN OF CARE
Goal Outcome Evaluation:  Plan of Care Reviewed With: patient, spouse           Outcome Evaluation: Patient awake, alert and oriented x 4.  Complaints of chest pain/pressure intermittently.  New order for sublingual nitroglycerin obtained and administered as indicated.  Patient remains on heparin drip.  Planning on heart cath 1/19/25, will be NPO after midnight.

## 2025-01-18 NOTE — H&P
[facility] ER HISTORY AND PHYSICAL  Date: 2025   Patient Name: Michelle Atkinson  : 1945  MRN: [id number]  Primary Care Physician:  Samy Leon Jr., MD  Date of admission: 2025    Subjective   Subjective     Chief Complaint: Chest pain    HPI:    Michelle Atkinson is a 79 y.o. female past medical history of atrial fibrillation on Eliquis, CAD status post stenting, essential hypertension and type 2 diabetes presents to ED due to chest pain.  Patient started developed pressure-like chest pain around 11 in the morning.  Patient states that pain is radiated towards her left arm and left neck as well.  Patient had a cath done last year which showed no acute findings.  Patient states being compliant with her home medications.  Patient chest pain improved after Nitropaste was placed.  Denies shortness of breath, fever, abdominal pain, headache, blurry vision, palpitations, dysuria or diarrhea.  In the ED, patient's vitals showed temperature 98.0, heart rate 66 and blood pressure 160/72.  Labs show troponin 823, sodium 138, creatinine 1.66, white blood cell 6.0, hemoglobin 12.6 and BNP 1748.  Chest x-ray shows some telemental dense consolidation in left lung base possible atelectasis or pneumonia.  Cardiology was consulted and will evaluate patient in the morning.  Patient was started heparin drip.  Patient admitted to floors for further management.    Personal History     Past Medical History:  Past Medical History:   Diagnosis Date   • CAD (coronary artery disease)    • Essential (primary) hypertension    • Ex-cigarette smoker     Quit    • Lung cancer 2016    S/P Radiation and chemotherapy   • Paroxysmal atrial fibrillation    • Sick sinus syndrome 2024   • Stroke 2023   • Type 2 diabetes mellitus        Past Surgical History:  Past Surgical History:   Procedure Laterality Date   • CARDIAC CATHETERIZATION N/A 2024    Procedure: Right and Left Heart Cath;   Surgeon: Kath Smith MD;  Location:  [facility]AS[facility];  Service: Cardiovascular;  Laterality: N/A;   • CARDIAC CATHETERIZATION N/A 01/31/2024    Procedure: Right Heart Cath;  Surgeon: Kath Smith MD;  Location:  [facility];  Service: Cardiovascular;  Laterality: N/A;   • CARDIAC CATHETERIZATION N/A 01/31/2024    Procedure: Left ventriculography;  Surgeon: Kath Smith MD;  Location:  [facility];  Service: Cardiovascular;  Laterality: N/A;   • CARDIAC ELECTROPHYSIOLOGY PROCEDURE N/A 10/18/2023    Procedure: Loop insertion- [place];  Surgeon: Kath Smith MD;  Location:  [facility];  Service: Cardiovascular;  Laterality: N/A;   • CARDIAC ELECTROPHYSIOLOGY PROCEDURE N/A 4/24/2024    Procedure: Pacemaker DC new biotronik;  Surgeon: Kath Smith MD;  Location: [facility] CATH INVASIVE LOCATION;  Service: Cardiovascular;  Laterality: N/A;  BIOTRONIK   • CARDIAC ELECTROPHYSIOLOGY PROCEDURE N/A 4/24/2024    Procedure: Loop recorder removal;  Surgeon: Kath Smith MD;  Location:  [facility] INVASIVE LOCATION;  Service: Cardiovascular;  Laterality: N/A;   • CATARACT EXTRACTION Bilateral    • CORONARY STENT PLACEMENT      2002 and 2007 stent placement in [place], [place]   • VAGINAL HYSTERECTOMY      1970's       Family History:   Family History   Problem Relation Age of Onset   • Heart disease Mother    • Heart disease Father    • Heart disease Brother        Social History:   Social History     Socioeconomic History   • Marital status:    Tobacco Use   • Smoking status: Former     Types: Cigarettes   • Smokeless tobacco: Never   Vaping Use   • Vaping status: Never Used   Substance and Sexual Activity   • Alcohol use: Not Currently   • Drug use: Never   • Sexual activity: Defer       Home Medications:  alendronate, apixaban, atorvastatin, bisoprolol, glipizide, omeprazole, pioglitazone, and  potassium chloride    Allergies:  Allergies   Allergen Reactions   • Codeine Nausea And Vomiting       Review of Systems   All systems were reviewed and negative except for: Above    Objective   Objective     Vitals:   Temp:  [98 °F (36.7 °C)] 98 °F (36.7 °C)  Heart Rate:  [66-67] 66  Resp:  [20-21] 20  BP: (162-178)/(72-77) 162/72    Physical Exam    Constitutional: Awake, alert, no acute distress   Eyes: Pupils equal, sclerae anicteric, no conjunctival injection   HENT: NCAT, mucous membranes moist   Neck: Supple, no thyromegaly, no lymphadenopathy, trachea midline   Respiratory: Clear to auscultation bilaterally, nonlabored respirations    Cardiovascular: RRR, no murmurs, rubs, or gallops, palpable pedal pulses bilaterally   Gastrointestinal: Positive bowel sounds, soft, nontender, nondistended   Musculoskeletal: No bilateral ankle edema, no clubbing or cyanosis to extremities   Psychiatric: Appropriate affect, cooperative   Neurologic: Oriented x 3, strength symmetric in all extremities, Cranial Nerves grossly intact to confrontation, speech clear   Skin: No rashes     Result Review    Result Review:  I have personally reviewed the results from the time of this admission to 1/17/2025 21:04 EST and agree with these findings:  x laboratory  Microbiology  x radiology  x EKG/Telemetry   Cardiology/Vascular   Pathology  x old records  Other:      Assessment & Plan   Assessment / Plan     Assessment/Plan:     Assessment  NSTEMI  Atrial flutter on Eliquis  Status post placement  History CAD status post PCI  CKD  Type 2 diabetes    Plan  Admit to [facility]  Telemetry  Monitor vitals  CBC BMP  Troponin trend   TSH, hemoglobin C, lipid panel  Chest x-ray reviewed  Echo ordered  Start home meds  Aspirin  Heparin drip  Fluids  Cardiology consulted      VTE Prophylaxis:  Pharmacologic VTE prophylaxis orders are present.        CODE STATUS:    Code Status (Patient has no pulse and is not breathing): CPR (Attempt to  Resuscitate)  Medical Interventions (Patient has pulse or is breathing): Full Support      Admission Status:  I believe this patient meets inpatient status.    Electronically signed by Jose Alfredo Peralta MD, 01/17/25, 9:04 PM EST.

## 2025-01-18 NOTE — PROGRESS NOTES
[facility]   Hospitalist Progress Note  Date: 2025  Patient Name: Michelle Atkinson  : 1945  MRN: [id number]  Date of admission: 2025      Subjective   Subjective     Chief Complaint: Chest pain    Summary:   Mihcelle Atkinson is a 79 y.o. female past medical history of atrial fibrillation on Eliquis, CAD status post stenting, essential hypertension and type 2 diabetes presents to ED due to chest pain.  Patient started developed pressure-like chest pain around 11 in the morning.  Patient states that pain is radiated towards her left arm and left neck as well.  Patient had a cath done last year which showed no acute findings.  Patient states being compliant with her home medications.  Patient chest pain improved after Nitropaste was placed.  Denies shortness of breath, fever, abdominal pain, headache, blurry vision, palpitations, dysuria or diarrhea.  In the ED, patient's vitals showed temperature 98.0, heart rate 66 and blood pressure 160/72.  Labs show troponin 823, sodium 138, creatinine 1.66, white blood cell 6.0, hemoglobin 12.6 and BNP 1748.  Chest x-ray shows some telemental dense consolidation in left lung base possible atelectasis or pneumonia.  Cardiology was consulted and will evaluate patient in the morning.  Patient was started heparin drip.  Patient admitted to floors for further management.  Patient seen by cardiology and recommended cardiac cath patient to think about it.    Interval Followup:   Vital signs stable on room air   Patient had episode of chest pain relieved by 2 sublingual nitroglycerin.  Per patient her previous heart issue has been shortness of air without any chest pain.  No fever or chills.  Denies any cough or phlegm.  Patient now agreeable to undergo cardiac cath    Review of Systems   All systems were reviewed and negative except for: Summary interval follow-up    Objective   Objective     Vitals:   Temp:  [97.9 °F (36.6 °C)-98.4 °F (36.9 °C)] 98.4 °F (36.9  °C)  Heart Rate:  [65-78] 65  Resp:  [12-28] 24  BP: (130-169)/(67-92) 130/69  Physical Exam    Constitutional: Awake, alert, no acute distress   Eyes: Pupils equal, sclerae anicteric, no conjunctival injection   HENT: NCAT, mucous membranes moist   Neck: Supple, no thyromegaly, no lymphadenopathy, trachea midline   Respiratory: Decreased to auscultation left base, nonlabored respirations    Cardiovascular: RRR, no murmurs, rubs, or gallops, palpable pedal pulses bilaterally   Gastrointestinal: Positive bowel sounds, soft, nontender, nondistended   Musculoskeletal: No bilateral ankle edema, no clubbing or cyanosis to extremities   Psychiatric: Appropriate affect, cooperative   Neurologic: Oriented x 3, strength symmetric in all extremities, Cranial Nerves grossly intact to confrontation, speech clear   Skin: No rashes     Result Review    Result Review:  I have personally reviewed the results for the past 24 hours and agree with these findings:  [x]  Laboratory  [x]  Microbiology  [x]  Radiology  [x]  EKG/Telemetry a paced rhythm at 63, LVH, QT 0.43  []  Cardiology/Vascular   []  Pathology  [x]  Old records  [x]  Other: Medications    Assessment & Plan   Assessment / Plan     Assessment:  Chest pain with markedly elevated troponin.  Non-STEMI.  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  Stable.  Left lower lobe infiltrate question pneumonia.  Left pleural effusion.  A-fib flutter on Eliquis  History of CVA with right-sided deficit.  History of lung cancer status post XRT and chemotherapy.  History of coronary artery disease status post stent.  Diabetes mellitus.  Hyperlipidemia.  Stable diastolic dysfunction of the heart with a EF of 50%       Plan:  Supplemental oxygen keep sats more than 90%.  Continue heparin drip.  Aspirin, Lipitor.  Home bisoprolol.  Appreciate cardiology input.  Patient now agreeable to have cardiac cath done.  2D echo noted with diastolic dysfunction  IV Rocephin.  Check CT of the  chest  Incentive spirometry and flutter valve.  Bronchopulmonary gene protocol.  Check strep and Legionella urine antigen.  Check procalcitonin.  PT OT.  Continue telemetry  Discussed plan with RN.  Discharge home when cleared by cardiology    VTE Prophylaxis:  Pharmacologic VTE prophylaxis orders are present.        CODE STATUS:   Code Status (Patient has no pulse and is not breathing): CPR (Attempt to Resuscitate)  Medical Interventions (Patient has pulse or is breathing): Full Support      Part of this note may be an electronic transcription/translation of spoken language to printed text using the Dragon Dictation System.     Electronically signed by Nikunj Kumar MD, 01/18/25, 3:17 PM EST.

## 2025-01-18 NOTE — CONSULTS
[facility]   Cardiology Consult Note    Patient Name: Michelle Atkinson  : 1945  MRN: [id number]  Primary Care Physician:  Samy Leon Jr., MD  Referring Physician: No ref. provider found  Date of admission: 2025    Subjective   Subjective     Reason for Consult/ Chief Complaint: Chest pressure    HPI:  Michelle Atkinson is a 79 y.o. female with history of coronary artery disease status post remote RCA stents.  She presents with chest pressure that started around 11:00 yesterday while she was lying in bed.  It did go to the left arm.  She apparently got hot and also cold sweats.  She did vomit once.  When she came to the emergency department she was given nitroglycerin and heparin drip.  She states the chest pain has resolved.  She does take Eliquis at home for history of atrial fibrillation.    Review of Systems   All systems were reviewed and negative except for: Chest pressure    Personal History     Past Medical History:   Diagnosis Date    CAD (coronary artery disease)     Essential (primary) hypertension     Ex-cigarette smoker     Quit     Lung cancer     S/P Radiation and chemotherapy    Paroxysmal atrial fibrillation     Sick sinus syndrome 2024    Stroke 2023    Type 2 diabetes mellitus         Past medical history reviewed      Family History: family history includes Heart disease in her brother, father, and mother. Otherwise pertinent FHx was reviewed and not pertinent to current issue.    Social History:  reports that she has quit smoking. Her smoking use included cigarettes. She has never used smokeless tobacco. She reports that she does not currently use alcohol. She reports that she does not use drugs.    Home Medications:  alendronate, apixaban, atorvastatin, bisoprolol, glipizide, omeprazole, pioglitazone, and potassium chloride    Allergies:  Allergies   Allergen Reactions    Codeine Nausea And Vomiting       Objective    Objective     Vitals:   Temp:   [97.9 °F (36.6 °C)-98.2 °F (36.8 °C)] 98.2 °F (36.8 °C)  Heart Rate:  [65-78] 65  Resp:  [12-21] 12  BP: (132-178)/(67-92) 132/71      Physical Exam:   Constitutional: Awake, alert, No acute distress    Eyes: PERRLA, sclerae anicteric, no conjunctival injection   HENT: NCAT, mucous membranes moist   Neck: Supple, no thyromegaly, no lymphadenopathy, trachea midline   Respiratory: Clear to auscultation bilaterally, nonlabored respirations    Cardiovascular: RRR, no murmurs, rubs, or gallops, palpable pedal pulses bilaterally   Gastrointestinal: Positive bowel sounds, soft, nontender, nondistended   Musculoskeletal: No bilateral ankle edema, no clubbing or cyanosis to extremities   Psychiatric: Appropriate affect, cooperative   Neurologic: Oriented x 3, strength symmetric in all extremities, Cranial Nerves grossly intact to confrontation, speech clear   Skin: No rashes     Result Review    Result Review:  I have personally reviewed the results from the time of this admission to 1/18/2025 08:06 EST and agree with these findings:  [x]  Laboratory  []  Microbiology  [x]  Radiology  [x]  EKG/Telemetry   [x]  Cardiology/Vascular   []  Pathology  [x]  Old records  []  Other:  Most notable findings include:     CMP          4/17/2024    11:21 1/17/2025    18:45 1/18/2025    04:15   CMP   Glucose 162  235  120    BUN 57  46  39    Creatinine 1.94  1.66  1.44    EGFR 25.8  31.1  36.8    Sodium 140  138  136    Potassium 4.3  4.8  4.3    Chloride 108  105  107    Calcium 8.6  9.7  8.6    Total Protein  6.6     Albumin  3.4     Globulin  3.2     Total Bilirubin  0.7     Alkaline Phosphatase  68     AST (SGOT)  55     ALT (SGPT)  13     Albumin/Globulin Ratio  1.1     BUN/Creatinine Ratio 29.4  27.7  27.1    Anion Gap 12.0  11.9  10.5       CBC          4/17/2024    11:21 1/17/2025    14:53 1/18/2025    03:08   CBC   WBC 4.86  6.04  7.00    RBC 3.87  4.41  3.71    Hemoglobin 11.7  12.6  10.5    Hematocrit 36.4  40.6  33.8    MCV  94.1  92.1  91.1    MCH 30.2  28.6  28.3    MCHC 32.1  31.0  31.1    RDW 13.0  15.8  15.8    Platelets 175  169  167       Lab Results   Component Value Date    TROPONINT 1,821 (C) 01/18/2025         Assessment & Plan   Assessment / Plan     Brief Patient Summary:  Michelle Atkinson is a 79 y.o. female who has a history of remote RCA stents.  She has a history of atrial fibrillation on Eliquis at home.  She presented with chest pressure and had a type I non-STEMI due to plaque rupture.  She is currently chest pain-free.    Active Hospital Problems:  Active Hospital Problems    Diagnosis     **NSTEMI (non-ST elevated myocardial infarction)        Assessment:  1.  Type I non-STEMI due to plaque rupture  2.  Known coronary artery disease  3.  Chronic kidney disease  4.  History of atrial fibrillation.    Plan:   1.  Continue the aspirin and heparin drip.  2.  Continue to hold Eliquis.  3.  Patient is currently chest pain-free.  I did give her the option of left heart cath versus medical management especially due to her chronic kidney disease.  Looking at the left heart cath from January 2024 I am worried by the ostial/proximal left circumflex.  I told her I would lean towards at least going in and taken a couple pictures to make sure nothing is severely stenosed.  She is going to think about it overnight which I think is fine as I would like the Eliquis to wear off a bit and she is again symptom-free.  4.  Continue the Lipitor and bisoprolol.  5.  Reviewed patient's echocardiogram.  It does look like she has inferoposterior/apical hypokinesis.  Her overall ejection fraction appears about 50%.  She has no significant valvular disease.  6.  EKG shows atrial paced rhythm.  At the time it was done and there is slight ST elevation in the anterior leads that fails to meet criteria for STEMI.    Electronically signed by James Damon MD, 01/18/25, 8:06 AM EST.

## 2025-01-19 LAB
ACT BLD: 233 SECONDS (ref 89–137)
ALBUMIN SERPL-MCNC: 3.1 G/DL (ref 3.5–5.2)
ANION GAP SERPL CALCULATED.3IONS-SCNC: 11.6 MMOL/L (ref 5–15)
APTT PPP: 92.7 SECONDS (ref 78–95.9)
BUN SERPL-MCNC: 32 MG/DL (ref 8–23)
BUN/CREAT SERPL: 21.1 (ref 7–25)
CALCIUM SPEC-SCNC: 9.2 MG/DL (ref 8.6–10.5)
CHLORIDE SERPL-SCNC: 107 MMOL/L (ref 98–107)
CO2 SERPL-SCNC: 19.4 MMOL/L (ref 22–29)
CREAT SERPL-MCNC: 1.52 MG/DL (ref 0.57–1)
EGFRCR SERPLBLD CKD-EPI 2021: 34.5 ML/MIN/1.73
GLUCOSE BLDC GLUCOMTR-MCNC: 116 MG/DL (ref 70–99)
GLUCOSE BLDC GLUCOMTR-MCNC: 143 MG/DL (ref 70–99)
GLUCOSE BLDC GLUCOMTR-MCNC: 167 MG/DL (ref 70–99)
GLUCOSE BLDC GLUCOMTR-MCNC: 68 MG/DL (ref 70–99)
GLUCOSE BLDC GLUCOMTR-MCNC: 87 MG/DL (ref 70–99)
GLUCOSE SERPL-MCNC: 95 MG/DL (ref 65–99)
L PNEUMO1 AG UR QL IA: NEGATIVE
PHOSPHATE SERPL-MCNC: 3.2 MG/DL (ref 2.5–4.5)
POTASSIUM SERPL-SCNC: 3.8 MMOL/L (ref 3.5–5.2)
QT INTERVAL: 545 MS
QTC INTERVAL: 554 MS
S PNEUM AG SPEC QL LA: NEGATIVE
SODIUM SERPL-SCNC: 138 MMOL/L (ref 136–145)

## 2025-01-19 PROCEDURE — C1887 CATHETER, GUIDING: HCPCS | Performed by: INTERNAL MEDICINE

## 2025-01-19 PROCEDURE — C1725 CATH, TRANSLUMIN NON-LASER: HCPCS | Performed by: INTERNAL MEDICINE

## 2025-01-19 PROCEDURE — 99233 SBSQ HOSP IP/OBS HIGH 50: CPT | Performed by: INTERNAL MEDICINE

## 2025-01-19 PROCEDURE — C1894 INTRO/SHEATH, NON-LASER: HCPCS | Performed by: INTERNAL MEDICINE

## 2025-01-19 PROCEDURE — 99232 SBSQ HOSP IP/OBS MODERATE 35: CPT | Performed by: INTERNAL MEDICINE

## 2025-01-19 PROCEDURE — 85347 COAGULATION TIME ACTIVATED: CPT

## 2025-01-19 PROCEDURE — 92928 PRQ TCAT PLMT NTRAC ST 1 LES: CPT | Performed by: INTERNAL MEDICINE

## 2025-01-19 PROCEDURE — B2151ZZ FLUOROSCOPY OF LEFT HEART USING LOW OSMOLAR CONTRAST: ICD-10-PCS | Performed by: INTERNAL MEDICINE

## 2025-01-19 PROCEDURE — C1874 STENT, COATED/COV W/DEL SYS: HCPCS | Performed by: INTERNAL MEDICINE

## 2025-01-19 PROCEDURE — 93005 ELECTROCARDIOGRAM TRACING: CPT | Performed by: INTERNAL MEDICINE

## 2025-01-19 PROCEDURE — 94799 UNLISTED PULMONARY SVC/PX: CPT

## 2025-01-19 PROCEDURE — 63710000001 INSULIN LISPRO (HUMAN) PER 5 UNITS: Performed by: INTERNAL MEDICINE

## 2025-01-19 PROCEDURE — 25810000003 SODIUM CHLORIDE 0.9 % SOLUTION: Performed by: INTERNAL MEDICINE

## 2025-01-19 PROCEDURE — 93458 L HRT ARTERY/VENTRICLE ANGIO: CPT | Performed by: INTERNAL MEDICINE

## 2025-01-19 PROCEDURE — 93010 ELECTROCARDIOGRAM REPORT: CPT | Performed by: INTERNAL MEDICINE

## 2025-01-19 PROCEDURE — 82948 REAGENT STRIP/BLOOD GLUCOSE: CPT | Performed by: INTERNAL MEDICINE

## 2025-01-19 PROCEDURE — 25010000002 FENTANYL CITRATE (PF) 50 MCG/ML SOLUTION: Performed by: INTERNAL MEDICINE

## 2025-01-19 PROCEDURE — 25510000001 IOPAMIDOL PER 1 ML: Performed by: INTERNAL MEDICINE

## 2025-01-19 PROCEDURE — C1769 GUIDE WIRE: HCPCS | Performed by: INTERNAL MEDICINE

## 2025-01-19 PROCEDURE — 85730 THROMBOPLASTIN TIME PARTIAL: CPT | Performed by: STUDENT IN AN ORGANIZED HEALTH CARE EDUCATION/TRAINING PROGRAM

## 2025-01-19 PROCEDURE — 25010000002 HEPARIN (PORCINE) PER 1000 UNITS: Performed by: INTERNAL MEDICINE

## 2025-01-19 PROCEDURE — 4A023N7 MEASUREMENT OF CARDIAC SAMPLING AND PRESSURE, LEFT HEART, PERCUTANEOUS APPROACH: ICD-10-PCS | Performed by: INTERNAL MEDICINE

## 2025-01-19 PROCEDURE — C1760 CLOSURE DEV, VASC: HCPCS | Performed by: INTERNAL MEDICINE

## 2025-01-19 PROCEDURE — 80069 RENAL FUNCTION PANEL: CPT | Performed by: INTERNAL MEDICINE

## 2025-01-19 PROCEDURE — 25010000002 NICARDIPINE 2.5 MG/ML SOLUTION: Performed by: INTERNAL MEDICINE

## 2025-01-19 PROCEDURE — 25010000002 MIDAZOLAM PER 1MG: Performed by: INTERNAL MEDICINE

## 2025-01-19 PROCEDURE — 25010000002 LIDOCAINE 2% SOLUTION: Performed by: INTERNAL MEDICINE

## 2025-01-19 PROCEDURE — B2111ZZ FLUOROSCOPY OF MULTIPLE CORONARY ARTERIES USING LOW OSMOLAR CONTRAST: ICD-10-PCS | Performed by: INTERNAL MEDICINE

## 2025-01-19 PROCEDURE — 82948 REAGENT STRIP/BLOOD GLUCOSE: CPT

## 2025-01-19 PROCEDURE — 25010000002 CEFTRIAXONE PER 250 MG: Performed by: INTERNAL MEDICINE

## 2025-01-19 PROCEDURE — C9600 PERC DRUG-EL COR STENT SING: HCPCS | Performed by: INTERNAL MEDICINE

## 2025-01-19 PROCEDURE — 027034Z DILATION OF CORONARY ARTERY, ONE ARTERY WITH DRUG-ELUTING INTRALUMINAL DEVICE, PERCUTANEOUS APPROACH: ICD-10-PCS | Performed by: INTERNAL MEDICINE

## 2025-01-19 DEVICE — EVEROLIMUS-ELUTING PLATINUM CHROMIUM CORONARY STENT SYSTEM
Type: IMPLANTABLE DEVICE | Site: HEART | Status: FUNCTIONAL
Brand: SYNERGY™ XD

## 2025-01-19 RX ORDER — NICARDIPINE HYDROCHLORIDE 2.5 MG/ML
INJECTION INTRAVENOUS
Status: DISCONTINUED | OUTPATIENT
Start: 2025-01-19 | End: 2025-01-19 | Stop reason: HOSPADM

## 2025-01-19 RX ORDER — LIDOCAINE HYDROCHLORIDE 20 MG/ML
INJECTION, SOLUTION INFILTRATION; PERINEURAL
Status: DISCONTINUED | OUTPATIENT
Start: 2025-01-19 | End: 2025-01-19 | Stop reason: HOSPADM

## 2025-01-19 RX ORDER — ONDANSETRON 2 MG/ML
4 INJECTION INTRAMUSCULAR; INTRAVENOUS EVERY 6 HOURS PRN
Status: DISCONTINUED | OUTPATIENT
Start: 2025-01-19 | End: 2025-01-20 | Stop reason: HOSPADM

## 2025-01-19 RX ORDER — MIDAZOLAM HYDROCHLORIDE 2 MG/2ML
INJECTION, SOLUTION INTRAMUSCULAR; INTRAVENOUS
Status: DISCONTINUED | OUTPATIENT
Start: 2025-01-19 | End: 2025-01-19 | Stop reason: HOSPADM

## 2025-01-19 RX ORDER — HEPARIN SODIUM 1000 [USP'U]/ML
INJECTION, SOLUTION INTRAVENOUS; SUBCUTANEOUS
Status: DISCONTINUED | OUTPATIENT
Start: 2025-01-19 | End: 2025-01-19 | Stop reason: HOSPADM

## 2025-01-19 RX ORDER — ACETAMINOPHEN 325 MG/1
650 TABLET ORAL EVERY 4 HOURS PRN
Status: DISCONTINUED | OUTPATIENT
Start: 2025-01-19 | End: 2025-01-20 | Stop reason: HOSPADM

## 2025-01-19 RX ORDER — FENTANYL CITRATE 50 UG/ML
INJECTION, SOLUTION INTRAMUSCULAR; INTRAVENOUS
Status: DISCONTINUED | OUTPATIENT
Start: 2025-01-19 | End: 2025-01-19 | Stop reason: HOSPADM

## 2025-01-19 RX ORDER — ASPIRIN 81 MG/1
TABLET ORAL
Status: DISCONTINUED | OUTPATIENT
Start: 2025-01-19 | End: 2025-01-19 | Stop reason: HOSPADM

## 2025-01-19 RX ORDER — SODIUM CHLORIDE 9 MG/ML
100 INJECTION, SOLUTION INTRAVENOUS CONTINUOUS
Status: ACTIVE | OUTPATIENT
Start: 2025-01-19 | End: 2025-01-19

## 2025-01-19 RX ORDER — IOPAMIDOL 755 MG/ML
INJECTION, SOLUTION INTRAVASCULAR
Status: DISCONTINUED | OUTPATIENT
Start: 2025-01-19 | End: 2025-01-19 | Stop reason: HOSPADM

## 2025-01-19 RX ORDER — ONDANSETRON 4 MG/1
4 TABLET, ORALLY DISINTEGRATING ORAL EVERY 6 HOURS PRN
Status: DISCONTINUED | OUTPATIENT
Start: 2025-01-19 | End: 2025-01-20 | Stop reason: HOSPADM

## 2025-01-19 RX ADMIN — ACETAMINOPHEN 650 MG: 325 TABLET ORAL at 11:15

## 2025-01-19 RX ADMIN — INSULIN LISPRO 2 UNITS: 100 INJECTION, SOLUTION INTRAVENOUS; SUBCUTANEOUS at 17:08

## 2025-01-19 RX ADMIN — PANTOPRAZOLE SODIUM 40 MG: 40 TABLET, DELAYED RELEASE ORAL at 05:21

## 2025-01-19 RX ADMIN — BISOPROLOL FUMARATE 5 MG: 5 TABLET ORAL at 08:30

## 2025-01-19 RX ADMIN — ACETAMINOPHEN 650 MG: 325 TABLET ORAL at 05:54

## 2025-01-19 RX ADMIN — ACETAMINOPHEN 650 MG: 325 TABLET ORAL at 19:54

## 2025-01-19 RX ADMIN — SODIUM CHLORIDE 100 ML/HR: 9 INJECTION, SOLUTION INTRAVENOUS at 10:57

## 2025-01-19 RX ADMIN — DEXTROSE MONOHYDRATE 25 G: 25 INJECTION, SOLUTION INTRAVENOUS at 07:06

## 2025-01-19 RX ADMIN — TICAGRELOR 90 MG: 90 TABLET ORAL at 19:54

## 2025-01-19 RX ADMIN — ASPIRIN 81 MG: 81 TABLET, CHEWABLE ORAL at 08:30

## 2025-01-19 RX ADMIN — SODIUM CHLORIDE 1000 MG: 9 INJECTION INTRAMUSCULAR; INTRAVENOUS; SUBCUTANEOUS at 11:15

## 2025-01-19 RX ADMIN — ATORVASTATIN CALCIUM 40 MG: 40 TABLET, FILM COATED ORAL at 19:55

## 2025-01-19 RX ADMIN — Medication 10 ML: at 22:50

## 2025-01-19 NOTE — PROGRESS NOTES
[facility]   Hospitalist Progress Note  Date: 2025  Patient Name: Michelle Atkinson  : 1945  MRN: [id number]  Date of admission: 2025      Subjective   Subjective     Chief Complaint: Chest pain    Summary:   Michelle Atkinson is a 79 y.o. female past medical history of atrial fibrillation on Eliquis, CAD status post stenting, essential hypertension and type 2 diabetes presents to ED due to chest pain.  Patient started developed pressure-like chest pain around 11 in the morning.  Patient states that pain is radiated towards her left arm and left neck as well.  Patient had a cath done last year which showed no acute findings.  Patient states being compliant with her home medications.  Patient chest pain improved after Nitropaste was placed.  Denies shortness of breath, fever, abdominal pain, headache, blurry vision, palpitations, dysuria or diarrhea.  In the ED, patient's vitals showed temperature 98.0, heart rate 66 and blood pressure 160/72.  Labs show troponin 823, sodium 138, creatinine 1.66, white blood cell 6.0, hemoglobin 12.6 and BNP 1748.  Chest x-ray shows some telemental dense consolidation in left lung base possible atelectasis or pneumonia.  Cardiology was consulted and will evaluate patient in the morning.  Patient was started heparin drip.  Patient admitted to floors for further management.  Patient seen by cardiology and recommended cardiac cath patient to think about it.  Patient had LHC on  with YEIMY x 1 and distal LAD due to severe stenosis.  CT chest showed large left effusion with left lower lobe consolidation, left thyroid nodule 2 cm, narrowing left upper lobe bronchus and medial consolidation left upper lobe likely from radiation therapy.  Need ultrasound of thyroid gland as an outpatient for 2 cm left thyroid nodule.  Patient needs to follow-up with ENT and pulmonary as an outpatient.    Interval Followup:   Vital signs stable on room air   Seen after cardiac  cath.  No chest pain since cardiac cath  No fever or chills.  Does have mild cough, no phlegm.  Does have shortness of air especially when she is bending forward  Denies any urinary complaints  Overall feels better    Review of Systems   All systems were reviewed and negative except for: Summary interval follow-up    Objective   Objective     Vitals:   Temp:  [98.4 °F (36.9 °C)-99.9 °F (37.7 °C)] 98.8 °F (37.1 °C)  Heart Rate:  [61-78] 63  Resp:  [14-16] 16  BP: (119-165)/(55-95) 144/78  Physical Exam    Constitutional: Awake, alert, no acute distress   Eyes: Pupils equal, sclerae anicteric, no conjunctival injection   HENT: NCAT, mucous membranes moist   Neck: Supple, no thyromegaly, no lymphadenopathy, trachea midline   Respiratory: Decreased to auscultation left base, nonlabored respirations    Cardiovascular: RRR, no murmurs, rubs, or gallops, palpable pedal pulses bilaterally   Gastrointestinal: Positive bowel sounds, soft, nontender, nondistended   Musculoskeletal: No bilateral ankle edema, no clubbing or cyanosis to extremities   Psychiatric: Appropriate affect, cooperative   Neurologic: Oriented x 3, strength symmetric in all extremities, Cranial Nerves grossly intact to confrontation, speech clear   Skin: No rashes, right groin dressed after cardiac cath    Result Review    Result Review:  I have personally reviewed the results for the past 24 hours and agree with these findings:  [x]  Laboratory  [x]  Microbiology  [x]  Radiology  []  EKG/Telemetry   []  Cardiology/Vascular   []  Pathology  [x]  Old records  [x]  Other: Medications    Assessment & Plan   Assessment / Plan     Assessment:  Chest pain with markedly elevated troponin.  Non-STEMI.  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  Stable.  Left lower lobe infiltrate question pneumonia.  Large left pleural effusion.  A-fib flutter on Eliquis  History of CVA with right-sided deficit.  History of  lung cancer status post XRT and chemotherapy.  History of coronary artery disease status post stent.  Diabetes mellitus.  Hyperlipidemia.  Stable diastolic dysfunction of the heart with a EF of 50%  Narrowing left upper lobe bronchus and medial consolidation left upper lobe likely fibrosis from XRT.  Bacteriuria and pyuria.  Asymptomatic  2 cm left thyroid nodule.     Plan:  Supplemental oxygen keep sats more than 90%.  Off heparin drip.  Aspirin, Lipitor.  Brilinta to be changed to Plavix in a.m. as per cardiology  Home bisoprolol.  Appreciate cardiology input.  Patient now agreeable to have cardiac cath done.  To resume Eliquis in the morning  2D echo noted with diastolic dysfunction  IV Rocephin.   CT of the chest noted and discussed with patient.  Patient does have appointment with pulmonologist at [facility]  Incentive spirometry and flutter valve.  Bronchopulmonary gene protocol.  strep and Legionella urine antigen negative.  Negative procalcitonin.  PT OT.  Continue telemetry  Discussed plan with RN.  Discharge home when cleared by cardiology    VTE Prophylaxis:  Pharmacologic VTE prophylaxis orders are present.        CODE STATUS:   Code Status (Patient has no pulse and is not breathing): CPR (Attempt to Resuscitate)  Medical Interventions (Patient has pulse or is breathing): Full Support      Part of this note may be an electronic transcription/translation of spoken language to printed text using the Dragon Dictation System.     Electronically signed by Nikunj Kumar MD, 01/19/25, 4:50 PM EST.

## 2025-01-19 NOTE — PROGRESS NOTES
[facility]     Cardiology Progress Note    Patient Name: Michelle Atkinson  : 1945  MRN: [id number]  Primary Care Physician:  Samy Leon Jr., MD  Date of admission: 2025    Subjective   Subjective   Chief complaint  Chest pain, non-STEMI    HPI:  Patient Reports no chest pain overnight.    Review of Systems   All systems were reviewed and negative except for: Chest pressure    Objective   Objective     Vitals:   Temp:  [98.4 °F (36.9 °C)-99.9 °F (37.7 °C)] 98.8 °F (37.1 °C)  Heart Rate:  [64-66] 64  Resp:  [12-24] 16  BP: (119-150)/(55-73) 119/65  Physical Exam   Neck: no JVD, no bruit  Lungs: clear to ausculation bilaterally.  No crackles or wheezes  CV: regular rate and rhythm, no murmur  Ext: no cyanosis, clubbing or edema.  Normal bilateral LE pulses.      Scheduled Meds:aspirin, 81 mg, Oral, Daily  atorvastatin, 40 mg, Oral, Nightly  bisoprolol, 5 mg, Oral, Daily  cefTRIAXone, 1,000 mg, Intravenous, Q24H  [Transfer Hold] insulin lispro, 2-7 Units, Subcutaneous, 4x Daily AC & at Bedtime  pantoprazole, 40 mg, Oral, Q AM  sodium chloride, 10 mL, Intravenous, Q12H      Continuous Infusions:heparin, 12 Units/kg/hr, Last Rate: Stopped (25 0901)  lactated ringers, 50 mL/hr, Last Rate: 50 mL/hr (25)  sodium chloride, 100 mL/hr           Result Review    Result Review:  I have personally reviewed the results from the time of this admission to 2025 10:21 EST and agree with these findings:  [x]  Laboratory  []  Microbiology  [x]  Radiology  [x]  EKG/Telemetry   [x]  Cardiology/Vascular   []  Pathology  []  Old records  []  Other:  Most notable findings include:     CBC          2024    11:21 2025    14:53 2025    03:08   CBC   WBC 4.86  6.04  7.00    RBC 3.87  4.41  3.71    Hemoglobin 11.7  12.6  10.5    Hematocrit 36.4  40.6  33.8    MCV 94.1  92.1  91.1    MCH 30.2  28.6  28.3    MCHC 32.1  31.0  31.1    RDW 13.0  15.8  15.8    Platelets 175  169  167       CMP          1/17/2025    18:45 1/18/2025    04:15 1/19/2025    03:49   CMP   Glucose 235  120  95    BUN 46  39  32    Creatinine 1.66  1.44  1.52    EGFR 31.1  36.8  34.5    Sodium 138  136  138    Potassium 4.8  4.3  3.8    Chloride 105  107  107    Calcium 9.7  8.6  9.2    Total Protein 6.6      Albumin 3.4   3.1    Globulin 3.2      Total Bilirubin 0.7      Alkaline Phosphatase 68      AST (SGOT) 55      ALT (SGPT) 13      Albumin/Globulin Ratio 1.1      BUN/Creatinine Ratio 27.7  27.1  21.1    Anion Gap 11.9  10.5  11.6       CARDIAC LABS:      Lab 01/18/25  0415 01/17/25  2132 01/17/25  2107 01/17/25  1845 01/17/25  1511   PROBNP  --   --   --   --  1,748.0   HSTROP T 1,821* 1,511*  --  823*  --    PROTIME  --   --  15.2*  --   --    INR  --   --  1.18*  --   --         Assessment & Plan   Assessment / Plan     Brief Patient Summary:  Michelle Atkinson is a 79 y.o. female who has a history of remote RCA stents.  She has a history of atrial fibrillation on Eliquis at home.  She presented with chest pressure and had a type I non-STEMI due to plaque rupture.  She is currently chest pain-free.     Active Hospital Problems:  Active Hospital Problems    Diagnosis     **NSTEMI (non-ST elevated myocardial infarction)        Assessment:  1.  Type I non-STEMI due to plaque rupture  2.  Known coronary artery disease  3.  Chronic kidney disease  4.  History of atrial fibrillation.     Plan:   1.  Continue the aspirin.  Patient got loaded on Brilinta in the Cath Lab.  I am going to switch this over to Plavix as she is on Eliquis.  Will plan on switching it tomorrow.  2.  Continue to hold Eliquis.  Patient can restart the Eliquis tomorrow.  3.  Talk to the patient again extensively about left heart cath versus medical management.  She chose left heart cath.  The left heart cath showed distal LAD severe stenosis with thrombosis noted.  She received 1 drug-eluting stent to the distal LAD.  The left circumflex ostium  looked okay from the images we took.  The RCA had patent stent with mild in-stent restenosis.  4.  Continue the Lipitor and bisoprolol.  5.  Reviewed patient's echocardiogram.  It does look like she has inferoposterior/apical hypokinesis.  Her overall ejection fraction appears about 50%.  She has no significant valvular disease.  6.  EKG shows atrial paced rhythm.  At the time it was done and there is slight ST elevation in the anterior leads that fails to meet criteria for STEMI.       CODE STATUS:   Code Status (Patient has no pulse and is not breathing): CPR (Attempt to Resuscitate)  Medical Interventions (Patient has pulse or is breathing): Full Support      Electronically signed by James Damon MD, 01/19/25, 10:21 AM EST.

## 2025-01-20 ENCOUNTER — READMISSION MANAGEMENT (OUTPATIENT)
Dept: CALL CENTER | Facility: HOSPITAL | Age: 80
End: 2025-01-20
Payer: MEDICARE

## 2025-01-20 VITALS
RESPIRATION RATE: 16 BRPM | BODY MASS INDEX: 28.93 KG/M2 | WEIGHT: 157.19 LBS | HEIGHT: 62 IN | TEMPERATURE: 97.9 F | HEART RATE: 63 BPM | DIASTOLIC BLOOD PRESSURE: 65 MMHG | SYSTOLIC BLOOD PRESSURE: 116 MMHG | OXYGEN SATURATION: 98 %

## 2025-01-20 PROBLEM — I21.9 TYPE 1 MYOCARDIAL INFARCTION: Status: ACTIVE | Noted: 2025-01-20

## 2025-01-20 PROBLEM — I21.4 NSTEMI (NON-ST ELEVATED MYOCARDIAL INFARCTION): Status: RESOLVED | Noted: 2025-01-17 | Resolved: 2025-01-20

## 2025-01-20 PROBLEM — I21.9 TYPE 1 MYOCARDIAL INFARCTION: Status: RESOLVED | Noted: 2025-01-20 | Resolved: 2025-01-20

## 2025-01-20 LAB
ALBUMIN SERPL-MCNC: 2.7 G/DL (ref 3.5–5.2)
ANION GAP SERPL CALCULATED.3IONS-SCNC: 11.3 MMOL/L (ref 5–15)
BACTERIA SPEC AEROBE CULT: NO GROWTH
BUN SERPL-MCNC: 30 MG/DL (ref 8–23)
BUN/CREAT SERPL: 22.2 (ref 7–25)
CALCIUM SPEC-SCNC: 8.9 MG/DL (ref 8.6–10.5)
CHLORIDE SERPL-SCNC: 107 MMOL/L (ref 98–107)
CO2 SERPL-SCNC: 18.7 MMOL/L (ref 22–29)
CREAT SERPL-MCNC: 1.35 MG/DL (ref 0.57–1)
DEPRECATED RDW RBC AUTO: 55.4 FL (ref 37–54)
EGFRCR SERPLBLD CKD-EPI 2021: 39.8 ML/MIN/1.73
ERYTHROCYTE [DISTWIDTH] IN BLOOD BY AUTOMATED COUNT: 16.4 % (ref 12.3–15.4)
GLUCOSE BLDC GLUCOMTR-MCNC: 148 MG/DL (ref 70–99)
GLUCOSE BLDC GLUCOMTR-MCNC: 211 MG/DL (ref 70–99)
GLUCOSE BLDC GLUCOMTR-MCNC: 47 MG/DL (ref 70–99)
GLUCOSE BLDC GLUCOMTR-MCNC: 61 MG/DL (ref 70–99)
GLUCOSE SERPL-MCNC: 75 MG/DL (ref 65–99)
HCT VFR BLD AUTO: 33.9 % (ref 34–46.6)
HGB BLD-MCNC: 10.5 G/DL (ref 12–15.9)
MCH RBC QN AUTO: 28.4 PG (ref 26.6–33)
MCHC RBC AUTO-ENTMCNC: 31 G/DL (ref 31.5–35.7)
MCV RBC AUTO: 91.6 FL (ref 79–97)
PHOSPHATE SERPL-MCNC: 3.3 MG/DL (ref 2.5–4.5)
PLATELET # BLD AUTO: 149 10*3/MM3 (ref 140–450)
PMV BLD AUTO: 11.5 FL (ref 6–12)
POTASSIUM SERPL-SCNC: 4 MMOL/L (ref 3.5–5.2)
RBC # BLD AUTO: 3.7 10*6/MM3 (ref 3.77–5.28)
SODIUM SERPL-SCNC: 137 MMOL/L (ref 136–145)
WBC NRBC COR # BLD AUTO: 7.99 10*3/MM3 (ref 3.4–10.8)

## 2025-01-20 PROCEDURE — 99239 HOSP IP/OBS DSCHRG MGMT >30: CPT | Performed by: INTERNAL MEDICINE

## 2025-01-20 PROCEDURE — 94799 UNLISTED PULMONARY SVC/PX: CPT

## 2025-01-20 PROCEDURE — 97166 OT EVAL MOD COMPLEX 45 MIN: CPT

## 2025-01-20 PROCEDURE — 80069 RENAL FUNCTION PANEL: CPT | Performed by: INTERNAL MEDICINE

## 2025-01-20 PROCEDURE — 82948 REAGENT STRIP/BLOOD GLUCOSE: CPT | Performed by: INTERNAL MEDICINE

## 2025-01-20 PROCEDURE — 99232 SBSQ HOSP IP/OBS MODERATE 35: CPT | Performed by: INTERNAL MEDICINE

## 2025-01-20 PROCEDURE — 94618 PULMONARY STRESS TESTING: CPT

## 2025-01-20 PROCEDURE — 25010000002 CEFTRIAXONE PER 250 MG: Performed by: INTERNAL MEDICINE

## 2025-01-20 PROCEDURE — 94640 AIRWAY INHALATION TREATMENT: CPT

## 2025-01-20 PROCEDURE — 85027 COMPLETE CBC AUTOMATED: CPT | Performed by: INTERNAL MEDICINE

## 2025-01-20 PROCEDURE — 63710000001 INSULIN LISPRO (HUMAN) PER 5 UNITS: Performed by: INTERNAL MEDICINE

## 2025-01-20 PROCEDURE — 82948 REAGENT STRIP/BLOOD GLUCOSE: CPT

## 2025-01-20 RX ORDER — ASPIRIN 81 MG/1
81 TABLET ORAL DAILY
Qty: 30 TABLET | Refills: 0 | Status: SHIPPED | OUTPATIENT
Start: 2025-01-21 | End: 2025-02-20

## 2025-01-20 RX ORDER — CLOPIDOGREL BISULFATE 75 MG/1
600 TABLET ORAL ONCE
Status: COMPLETED | OUTPATIENT
Start: 2025-01-20 | End: 2025-01-20

## 2025-01-20 RX ORDER — CLOPIDOGREL BISULFATE 75 MG/1
75 TABLET ORAL DAILY
Status: DISCONTINUED | OUTPATIENT
Start: 2025-01-20 | End: 2025-01-20

## 2025-01-20 RX ORDER — CLOPIDOGREL BISULFATE 75 MG/1
75 TABLET ORAL DAILY
Status: DISCONTINUED | OUTPATIENT
Start: 2025-01-21 | End: 2025-01-20 | Stop reason: HOSPADM

## 2025-01-20 RX ORDER — ASPIRIN 81 MG/1
81 TABLET ORAL DAILY
Status: DISCONTINUED | OUTPATIENT
Start: 2025-01-20 | End: 2025-01-20 | Stop reason: HOSPADM

## 2025-01-20 RX ORDER — PANTOPRAZOLE SODIUM 20 MG/1
20 TABLET, DELAYED RELEASE ORAL
Qty: 30 TABLET | Refills: 0 | Status: SHIPPED | OUTPATIENT
Start: 2025-01-21

## 2025-01-20 RX ORDER — CLOPIDOGREL BISULFATE 75 MG/1
75 TABLET ORAL DAILY
Qty: 30 TABLET | Refills: 0 | Status: SHIPPED | OUTPATIENT
Start: 2025-01-21 | End: 2025-02-20

## 2025-01-20 RX ADMIN — IPRATROPIUM BROMIDE AND ALBUTEROL SULFATE 3 ML: 2.5; .5 SOLUTION RESPIRATORY (INHALATION) at 04:20

## 2025-01-20 RX ADMIN — BISOPROLOL FUMARATE 5 MG: 5 TABLET ORAL at 08:52

## 2025-01-20 RX ADMIN — ASPIRIN 81 MG: 81 TABLET, CHEWABLE ORAL at 08:49

## 2025-01-20 RX ADMIN — ACETAMINOPHEN 650 MG: 325 TABLET ORAL at 03:28

## 2025-01-20 RX ADMIN — CLOPIDOGREL BISULFATE 600 MG: 75 TABLET ORAL at 12:33

## 2025-01-20 RX ADMIN — ACETAMINOPHEN 650 MG: 325 TABLET ORAL at 11:40

## 2025-01-20 RX ADMIN — TICAGRELOR 90 MG: 90 TABLET ORAL at 08:49

## 2025-01-20 RX ADMIN — Medication 10 ML: at 08:50

## 2025-01-20 RX ADMIN — PANTOPRAZOLE SODIUM 40 MG: 40 TABLET, DELAYED RELEASE ORAL at 06:05

## 2025-01-20 RX ADMIN — INSULIN LISPRO 3 UNITS: 100 INJECTION, SOLUTION INTRAVENOUS; SUBCUTANEOUS at 12:32

## 2025-01-20 RX ADMIN — SODIUM CHLORIDE 1000 MG: 9 INJECTION INTRAMUSCULAR; INTRAVENOUS; SUBCUTANEOUS at 12:33

## 2025-01-20 RX ADMIN — ASPIRIN 81 MG: 81 TABLET, COATED ORAL at 12:32

## 2025-01-20 NOTE — PLAN OF CARE
Goal Outcome Evaluation:  Plan of Care Reviewed With: patient        Progress: improving  Outcome Evaluation: Patient had complaints of SOA and trouble breathing. PRN breathing treatment administered, will continue to monitor.

## 2025-01-20 NOTE — DISCHARGE SUMMARY
[facility]        HOSPITALIST  DISCHARGE SUMMARY    Patient Name: Michelle Atkinson  : 1945  MRN: [id number]    Date of Admission: 2025  Date of Discharge:  2025  Primary Care Physician: Samy Leon Jr., MD    Consults       Date and Time Order Name Status Description    2025  8:29 PM Hospitalist (on-call MD unless specified)      2025  7:59 PM Cardiology (on-call MD unless specified)              Active and Resolved Hospital Problems:  Active Hospital Problems   No active problems to display.      Resolved Hospital Problems    Diagnosis POA   • **NSTEMI (non-ST elevated myocardial infarction) [I21.4] Yes   • Type 1 myocardial infarction [I21.9] Yes     Chest pain with markedly elevated troponin.  Resolved  Non-STEMI.  S/p LHC, severe distal LAD stenosis status post YEIMY x 1.  Started on Plavix  CKD 3B-4 with baseline creatinine 1.6.  Stable.  Left lower lobe infiltrate question pneumonia.  Large left pleural effusion.  A-fib flutter on Eliquis  History of CVA with right-sided deficit.  History of lung cancer status post XRT and chemotherapy.  History of coronary artery disease status post stent.  Diabetes mellitus.  Hyperlipidemia.  Stable diastolic dysfunction of the heart with a EF of 50%  Narrowing left upper lobe bronchus and medial consolidation left upper lobe likely fibrosis from XRT.  Bacteriuria and pyuria.  Asymptomatic  2 cm left thyroid nodule.  Hospital Course     Hospital Course:  Michelle Atkinson is a 79 y.o. female past medical history of atrial fibrillation on Eliquis, CAD status post stenting, essential hypertension and type 2 diabetes presents to ED due to chest pain.  Patient started developed pressure-like chest pain around 11 in the morning.  Patient states that pain is radiated towards her left arm and left neck as well.  Patient had a cath done last year which showed no acute findings.  Patient states being compliant with her home  Breast biopsy requested per Vincent Quintero (Elijah). medications.  Patient chest pain improved after Nitropaste was placed.  Denies shortness of breath, fever, abdominal pain, headache, blurry vision, palpitations, dysuria or diarrhea.  In the ED, patient's vitals showed temperature 98.0, heart rate 66 and blood pressure 160/72.  Labs show troponin 823, sodium 138, creatinine 1.66, white blood cell 6.0, hemoglobin 12.6 and BNP 1748.  Chest x-ray shows some telemental dense consolidation in left lung base possible atelectasis or pneumonia.  Cardiology was consulted and will evaluate patient in the morning.  Patient was started heparin drip.  Patient admitted to floors for further management.  Patient seen by cardiology and recommended cardiac cath patient to think about it.  Patient had LHC on January 19 with YEIMY x 1 and distal LAD due to severe stenosis.  CT chest showed large left effusion with left lower lobe consolidation, left thyroid nodule 2 cm, narrowing left upper lobe bronchus and medial consolidation left upper lobe likely from radiation therapy.  Need ultrasound of thyroid gland as an outpatient for 2 cm left thyroid nodule.  Patient needs to follow-up with ENT and pulmonary as an outpatient.     Interval Followup:   Vital signs stable on room air.  Patient passed walking oximetry test not requiring home oxygen set up.    No chest pain since cardiac cath  No fever or chills.  Does have mild cough, no phlegm.  Does have shortness of air especially when she is bending forward  Denies any urinary complaints  Overall feels better  Patient cleared by cardiology released.  Patient wants to go home.    DISCHARGE Follow Up Recommendations for labs and diagnostics: PCP, cardiology, heme-onc and pulmonary.  PCP to order thyroid ultrasound for 2 cm left thyroid nodule.  PCP to follow-up on the results of pending blood work.  Need repeat CT chest as per pulmonary/heme-onc.  Take aspirin for 1 month and then stop.  Resume home Eliquis in the morning and continue along  with Plavix.      Day of Discharge     Vital Signs:  Temp:  [97.3 °F (36.3 °C)-98.1 °F (36.7 °C)] 97.9 °F (36.6 °C)  Heart Rate:  [61-68] 63  Resp:  [16] 16  BP: (116-171)/(44-74) 116/65  Flow (L/min) (Oxygen Therapy):  [2] 2    Physical Exam:       Constitutional: Awake, alert, no acute distress              Eyes: Pupils equal, sclerae anicteric, no conjunctival injection              HENT: NCAT, mucous membranes moist              Neck: Supple, no thyromegaly, no lymphadenopathy, trachea midline              Respiratory: Decreased to auscultation left base, nonlabored respirations               Cardiovascular: RRR, no murmurs, rubs, or gallops, palpable pedal pulses bilaterally              Gastrointestinal: Positive bowel sounds, soft, nontender, nondistended              Musculoskeletal: No bilateral ankle edema, no clubbing or cyanosis to extremities              Psychiatric: Appropriate affect, cooperative              Neurologic: Oriented x 3, strength symmetric in all extremities, Cranial Nerves grossly intact to confrontation, speech clear              Skin: No rashes, right groin dressed after cardiac cath  Discharge Details        Discharge Medications        New Medications        Instructions Start Date   amoxicillin-clavulanate 875-125 MG per tablet  Commonly known as: AUGMENTIN   1 tablet, Oral, 2 Times Daily   Start Date: January 21, 2025     aspirin 81 MG EC tablet   81 mg, Oral, Daily   Start Date: January 21, 2025     clopidogrel 75 MG tablet  Commonly known as: PLAVIX   75 mg, Oral, Daily   Start Date: January 21, 2025            Continue These Medications        Instructions Start Date   alendronate 70 MG tablet  Commonly known as: FOSAMAX   Take 1 tablet by mouth Every 7 (Seven) Days. SUN      apixaban 5 MG tablet tablet  Commonly known as: ELIQUIS   5 mg, Oral, Every 12 Hours Scheduled      atorvastatin 40 MG tablet  Commonly known as: LIPITOR   Take 1 tablet by mouth Every Night.       bisoprolol 5 MG tablet  Commonly known as: ZEBeta   5 mg, Daily      glipizide 10 MG tablet  Commonly known as: GLUCOTROL   20 mg, 2 Times Daily Before Meals      omeprazole 20 MG capsule  Commonly known as: priLOSEC   Take 1 capsule by mouth Daily.      pioglitazone 30 MG tablet  Commonly known as: ACTOS   Take 1 tablet by mouth Daily.      potassium chloride 10 MEQ CR tablet   10 mEq, Oral, Daily               Allergies   Allergen Reactions   • Codeine Nausea And Vomiting       Discharge Disposition:  Home-Health Care [facility] [place]    Diet:  Diet Instructions       Diet: Diabetic Diets, Cardiac Diets; Healthy Heart (2-3 Na+); Thin (IDDSI 0); Consistent Carbohydrate      Discharge Diet:  Diabetic Diets  Cardiac Diets       Cardiac Diet: Healthy Heart (2-3 Na+)    Fluid Consistency: Thin (IDDSI 0)    Diabetic Diet: Consistent Carbohydrate            Discharge Activity:   Activity Instructions       Activity as Tolerated              CODE STATUS:  Code Status and Medical Interventions: CPR (Attempt to Resuscitate); Full Support   Ordered at: 01/17/25 2059     Code Status (Patient has no pulse and is not breathing):    CPR (Attempt to Resuscitate)     Medical Interventions (Patient has pulse or is breathing):    Full Support         Future Appointments   Date Time Provider Department Center   5/7/2025 10:15 AM [facility] SPT [facility] DEVICE CHECK [facility]   12/11/2025 10:00 AM [facility] ECHO CART [facility]   12/11/2025 11:00 AM Kath Smith MD [facility]       Additional Instructions for the Follow-ups that You Need to Schedule       Discharge Follow-up with PCP   As directed       Currently Documented PCP:    Samy Leon Jr., MD    PCP Phone Number:    [phone]     Follow Up Details: 1 week        Discharge Follow-up with Specified Provider: Dr. Smith; 3 Weeks   As directed      To: Dr. Smith   Follow Up: 3 Weeks   Follow Up Details: Cardiology        Discharge Follow-up with  Specified Provider: Heme-onc at [facility]; 2 Weeks   As directed      To: Heme-onc at [facility]   Follow Up: 2 Weeks        Discharge Follow-up with Specified Provider: Lung doctor at [facility]; 2 Weeks   As directed      To: Lung doctor at [facility]   Follow Up: 2 Weeks   Follow Up Details: Left upper lobe bronchial narrowing, left lower lung infiltrate and effusion                Pertinent  and/or Most Recent Results     PROCEDURES:   Procedure:    Left Heart Cath  CPT(R) Code:  03786 - NE CATH PLMT L HRT & ARTS W/NJX & ANGIO IMG S&I       LAB RESULTS:      Lab 01/20/25  0438 01/19/25  0349 01/18/25  1832 01/18/25  1117 01/18/25  0924 01/18/25  0415 01/18/25  0308 01/17/25  2107 01/17/25  1453   WBC 7.99  --   --   --   --   --  7.00  --  6.04   HEMOGLOBIN 10.5*  --   --   --   --   --  10.5*  --  12.6   HEMATOCRIT 33.9*  --   --   --   --   --  33.8*  --  40.6   PLATELETS 149  --   --   --   --   --  167  --  169   NEUTROS ABS  --   --   --   --   --   --  5.18  --  3.84   IMMATURE GRANS (ABS)  --   --   --   --   --   --  0.02  --  0.02   LYMPHS ABS  --   --   --   --   --   --  1.20  --  1.60   MONOS ABS  --   --   --   --   --   --  0.56  --  0.45   EOS ABS  --   --   --   --   --   --  0.02  --  0.10   MCV 91.6  --   --   --   --   --  91.1  --  92.1   PROCALCITONIN  --   --   --   --   --  0.07  --   --   --    LACTATE  --   --   --  0.9  --   --   --   --   --    PROTIME  --   --   --   --   --   --   --  15.2*  --    APTT  --  92.7 >200.0*  --  53.8*  --  >200.0* 32.3*  --          Lab 01/20/25  0438 01/19/25  0349 01/18/25  0415 01/17/25  2132 01/17/25  1845 01/17/25  1511 01/17/25  1453   SODIUM 137 138 136  --  138  --   --    POTASSIUM 4.0 3.8 4.3  --  4.8  --   --    CHLORIDE 107 107 107  --  105  --   --    CO2 18.7* 19.4* 18.5*  --  21.1*  --   --    ANION GAP 11.3 11.6 10.5  --  11.9  --   --    BUN 30* 32* 39*  --  46*  --   --    CREATININE 1.35* 1.52* 1.44*  --  1.66*  --   --    EGFR 39.8* 34.5* 36.8*   --  31.1*  --   --    GLUCOSE 75 95 120*  --  235*  --   --    CALCIUM 8.9 9.2 8.6  --  9.7  --   --    MAGNESIUM  --   --   --   --   --  1.8  --    PHOSPHORUS 3.3 3.2  --   --   --   --   --    HEMOGLOBIN A1C  --   --   --   --   --   --  10.20*   TSH  --   --   --  3.340  --   --   --          Lab 01/20/25  0438 01/19/25  0349 01/17/25 1845   TOTAL PROTEIN  --   --  6.6   ALBUMIN 2.7* 3.1* 3.4*   GLOBULIN  --   --  3.2   ALT (SGPT)  --   --  13   AST (SGOT)  --   --  55*   BILIRUBIN  --   --  0.7   ALK PHOS  --   --  68   LIPASE  --   --  27         Lab 01/18/25  0415 01/17/25  2132 01/17/25  2107 01/17/25  1845 01/17/25  1511   PROBNP  --   --   --   --  1,748.0   HSTROP T 1,821* 1,511*  --  823*  --    PROTIME  --   --  15.2*  --   --    INR  --   --  1.18*  --   --          Lab 01/17/25 2132   CHOLESTEROL 156   LDL CHOL 91   HDL CHOL 45   TRIGLYCERIDES 113             Brief Urine Lab Results  (Last result in the past 365 days)        Color   Clarity   Blood   Leuk Est   Nitrite   Protein   CREAT   Urine HCG        01/18/25 1902 Yellow   Cloudy   Large (3+)   Moderate (2+)   Positive   Trace                 Microbiology Results (last 10 days)       Procedure Component Value - Date/Time    Legionella Antigen, Urine - Urine, Urine, Clean Catch [[id number]]  (Normal) Collected: 01/18/25 1902    Lab Status: Final result Specimen: Urine, Clean Catch Updated: 01/19/25 0834     LEGIONELLA ANTIGEN, URINE Negative    S. Pneumo Ag Urine or CSF - Urine, Urine, Clean Catch [[id number]]  (Normal) Collected: 01/18/25 1902    Lab Status: Final result Specimen: Urine, Clean Catch Updated: 01/19/25 0834     Strep Pneumo Ag Negative    Urine Culture - Urine, Urine, Clean Catch [[id number]]  (Normal) Collected: 01/18/25 1902    Lab Status: Final result Specimen: Urine, Clean Catch Updated: 01/20/25 1056     Urine Culture No growth            CT Chest Without Contrast Diagnostic    Result Date: 1/18/2025  Impression: 1.Large left  pleural effusion. There is some left lower lobe consolidation that may all be secondary to compressive atelectasis. Pneumonia is not excluded. 2.There is narrowing of the left upper lobe bronchus, and medial consolidation in the left upper lobe. This could be due to atelectasis or fibrosis from radiation therapy. Comparison with any outside prior CTs would be beneficial. 3.Emphysema. 4.Cardiomegaly with small pericardial effusion. 5.Atherosclerotic disease and coronary artery disease. 6.Left thyroid nodule. This could be assessed with outpatient thyroid ultrasound if indicated. 7.Mild superior endplate fracture at T3 that may be subacute to old as there is some associated sclerosis. No retropulsion. 8.Additional findings as described above. Electronically Signed: Vidal Green MD  1/18/2025 9:55 PM EST  Workstation ID: [id number]    XR Chest 1 View    Result Date: 1/17/2025  Impression: Impression: Interval development of dense consolidation in the left lung base, possibly representing atelectasis or pneumonia. A small to moderate left pleural effusion is not excluded. A follow-up radiograph in 3-4 weeks is suggested to reevaluate. If the density persists, consider chest CT to further evaluate. Electronically Signed: Maurisio Horn MD  1/17/2025 3:29 PM EST  Workstation ID: [id number]             Results for orders placed during the hospital encounter of 01/17/25    Adult Transthoracic Echo Complete W/ Cont if Necessary Per Protocol    Interpretation Summary  •  Left ventricular ejection fraction appears approximately 50%.  Inferoapical hypokinesis noted.  •  Left ventricular diastolic function is consistent with (grade I) impaired relaxation and age.  •  Estimated right ventricular systolic pressure from tricuspid regurgitation is mildly elevated (35-45 mmHg).  •  Mild to early moderate mitral and tricuspid regurgitation.  •  Pleural effusion noted.      Imaging Results (All)       Procedure Component Value Units  Date/Time    CT Chest Without Contrast Diagnostic [[id number]] Collected: 01/18/25 2147     Updated: 01/18/25 2157    Narrative:      CT CHEST WO CONTRAST DIAGNOSTIC    Date of Exam: 1/18/2025 9:35 PM EST    Indication: Left lower lobe infiltrate and effusion. Abnormal chest x-ray. History of lung cancer.    Comparison: None available.    Technique: Axial CT images were obtained of the chest without contrast administration.  Reconstructed coronal and sagittal images were also obtained. Automated exposure control and iterative construction methods were used.    Findings:  Left-sided pacer is present. There is a hypodense left thyroid nodule measuring at least 2.0 cm with a partially calcified rim. This could be assessed with outpatient thyroid ultrasound if indicated. There is atherosclerotic disease and extensive   coronary artery disease. Heart is enlarged. Small pericardial effusion is noted measuring up to about 6 mm in thickness near the apex. A trace amount of right pleural fluid is likely present. There is a large left pleural effusion. No definite evidence   of adenopathy allowing for the lack of IV contrast. Upper abdominal images partially show a left renal cyst measuring at least 4.1 cm.    Lung windows demonstrate emphysema. There is narrowing of the left upper lobe bronchus, and there is medial consolidation in the left upper lobe. This could be due to atelectasis or fibrosis from radiation therapy. There is some left lower lobe   consolidation that may all be secondary to compressive atelectasis. Pneumonia is not excluded. Comparison with any outside prior CTs would be beneficial. There is some dependent atelectasis in the right lower lobe. No clearly suspicious osseous lesions.   There is an old compression fracture at T12. There is a mild superior endplate fracture at T3 that may be subacute to old as there is some associated sclerosis. No retropulsion.      Impression:      1.Large left pleural  effusion. There is some left lower lobe consolidation that may all be secondary to compressive atelectasis. Pneumonia is not excluded.  2.There is narrowing of the left upper lobe bronchus, and medial consolidation in the left upper lobe. This could be due to atelectasis or fibrosis from radiation therapy. Comparison with any outside prior CTs would be beneficial.  3.Emphysema.  4.Cardiomegaly with small pericardial effusion.  5.Atherosclerotic disease and coronary artery disease.  6.Left thyroid nodule. This could be assessed with outpatient thyroid ultrasound if indicated.  7.Mild superior endplate fracture at T3 that may be subacute to old as there is some associated sclerosis. No retropulsion.  8.Additional findings as described above.        Electronically Signed: Vidal Green MD    1/18/2025 9:55 PM EST    Workstation ID: [id number]    XR Chest 1 View [[id number]] Collected: 01/17/25 1523     Updated: 01/17/25 1532    Narrative:      XR CHEST 1 VW    Date of Exam: 1/17/2025 3:06 PM EST    Indication: Chest Pain Triage Protocol    Comparison: Chest AP dated 4/24/2024 from [facility]    Findings:  There is consolidation in the left lung base, new in the interval. The right lung is clear. The cardiac and mediastinal silhouettes appear normal. A dual-lead transvenous pacemaker remains in good position.      Impression:      Impression:  Interval development of dense consolidation in the left lung base, possibly representing atelectasis or pneumonia. A small to moderate left pleural effusion is not excluded. A follow-up radiograph in 3-4 weeks is suggested to reevaluate. If the density   persists, consider chest CT to further evaluate.      Electronically Signed: Maurisio Horn MD    1/17/2025 3:29 PM EST    Workstation ID: [id number]             Labs Pending at Discharge:          Time spent on Discharge including face to face service: 31 minutes  Part of this note may be an electronic  transcription/translation of spoken language to printed text using the Dragon Dictation System.     TElectronically signed by Nikunj Kumar MD, 01/20/25, 3:55 PM EST.

## 2025-01-20 NOTE — PLAN OF CARE
Goal Outcome Evaluation:              Outcome Evaluation: A&Ox4, no patient complaints verbalized, spouse at bedside. Cath site dressing is clean, dry and intact.

## 2025-01-20 NOTE — PROGRESS NOTES
Walking Oximetry Progress Note      Patient Name:  Michelle Atkinson  YOB: 1945  Date of Procedure: 01/20/25              ROOM AIR BASELINE   SpO2% 97   Heart Rate 70     EXERCISE ON ROOM AIR SpO2% EXERCISE ON O2 LPM SpO2%   1 MINUTE 96 1 MINUTE     2 MINUTES 97 2 MINUTES     3 MINUTES 95 3 MINUTES     4 MINUTES 96 4 MINUTES     5 MINUTES 95 5 MINUTES     6 MINUTES 96 6 MINUTES                Time to Recovery     SpO2% Post Exercise  96 on room air.    HR Post Exercise  70     Comments: Patient complained of shortness of air during walk           Electronically signed by Anupama Garcia, PARVIN, 01/20/25, 11:53 AM EST.

## 2025-01-20 NOTE — PLAN OF CARE
Goal Outcome Evaluation:  Plan of Care Reviewed With: patient        Progress: no change (Evaluation)  Outcome Evaluation: Patient presents with limitations of endurance/activity tolerance which impede  her ability to perform ADLs/transfers as prior.  The skills of a therapist will be required to safely and effectively implement treatment plan to restore maximum level function.    Anticipated Discharge Disposition (OT): home with assist, home with home health (Recommend HH or out patient program to address impaired activity tolerance for safe return to PLOF.)

## 2025-01-20 NOTE — THERAPY EVALUATION
Patient Name: Michelle Atkinson  : 1945    MRN: [id number]                              Today's Date: 2025       Admit Date: 2025    Visit Dx:     ICD-10-CM ICD-9-CM   1. Chest pain, unspecified type  R07.9 786.50   2. Non-STEMI (non-ST elevated myocardial infarction)  I21.4 410.70   3. NSTEMI (non-ST elevated myocardial infarction)  I21.4 410.70     Patient Active Problem List   Diagnosis    Abnormal EKG    Coronary artery disease of native artery of native heart with stable angina pectoris    Chronic anticoagulation    Paroxysmal atrial fibrillation    CVA (cerebrovascular accident)    Status post placement of implantable loop recorder    Primary hypertension    Precordial pain    SOB (shortness of breath)    Renal failure    Weak    Hypotension    Dizziness    Sick sinus syndrome    Pacemaker reprogramming/check    NSTEMI (non-ST elevated myocardial infarction)    Type 1 myocardial infarction     Past Medical History:   Diagnosis Date    CAD (coronary artery disease)     Essential (primary) hypertension     Ex-cigarette smoker     Quit     Lung cancer     S/P Radiation and chemotherapy    Paroxysmal atrial fibrillation     Sick sinus syndrome 2024    Stroke 2023    Type 2 diabetes mellitus      Past Surgical History:   Procedure Laterality Date    CARDIAC CATHETERIZATION N/A 2024    Procedure: Right and Left Heart Cath;  Surgeon: Kath Smith MD;  Location:  [facility];  Service: Cardiovascular;  Laterality: N/A;    CARDIAC CATHETERIZATION N/A 2024    Procedure: Right Heart Cath;  Surgeon: Kath Smith MD;  Location:  [facility];  Service: Cardiovascular;  Laterality: N/A;    CARDIAC CATHETERIZATION N/A 2024    Procedure: Left ventriculography;  Surgeon: Kath Smith MD;  Location:  [facility];  Service: Cardiovascular;  Laterality: N/A;    CARDIAC ELECTROPHYSIOLOGY PROCEDURE  N/A 10/18/2023    Procedure: Loop insertion- [place];  Surgeon: Kath Smith MD;  Location:  [facility];  Service: Cardiovascular;  Laterality: N/A;    CARDIAC ELECTROPHYSIOLOGY PROCEDURE N/A 4/24/2024    Procedure: Pacemaker DC new biotronik;  Surgeon: Kath Smith MD;  Location:  [facility];  Service: Cardiovascular;  Laterality: N/A;  BIOTRONIK    CARDIAC ELECTROPHYSIOLOGY PROCEDURE N/A 4/24/2024    Procedure: Loop recorder removal;  Surgeon: Kath Smith MD;  Location:  [facility];  Service: Cardiovascular;  Laterality: N/A;    CATARACT EXTRACTION Bilateral     CORONARY STENT PLACEMENT      2002 and 2007 stent placement in [place], [place]    VAGINAL HYSTERECTOMY      1970's      General Information       Row Name 01/20/25 1134          OT Time and Intention    Document Type evaluation  -SC     Mode of Treatment individual therapy;occupational therapy  -SC     Patient Effort good  -SC       Row Name 01/20/25 1135          General Information    Patient Profile Reviewed yes  -SC     Prior Level of Function independent:  Pt lives w/ spouse and is independent in ADLs. She uses a cane for mobility, has a walk in shower with bench and did not require home oxygen.She reports slight balance defecits prior to hospitalization due to a CVA one year ago.  -SC     Existing Precautions/Restrictions fall;oxygen therapy device and L/min  2L  -SC     Barriers to Rehab none identified  -SC       Row Name 01/20/25 1130          Occupational Profile    Reason for Services/Referral (Occupational Profile) Patient is a 79 year-old female admitted to [facility] on 1/17/2025.  OT consulted due to a decline in function and mobility.  No previous OT services for current condition.  -SC     Patient Goals (Occupational Profile) I want to return home to live with my  and be able to take care of myself  -SC       Row Name 01/20/25 4243          Living  Environment    People in Home spouse  -[facility] Name 01/20/25 1134          Home Main Entrance    Number of Stairs, Main Entrance three  -SC     Stair Railings, Main Entrance none  -[facility] Name 01/20/25 1134          Stairs Within Home, Primary    Stairs, Within Home, Primary non essential stairs leading to basement  -SC     Number of Stairs, Within Home, Primary twelve  -SC     Stair Railings, Within Home, Primary railings safe and in good condition  -[facility] Name 01/20/25 1134          Cognition    Orientation Status (Cognition) oriented x 3  -[facility] Name 01/20/25 1134          Safety Issues/Impairments Affecting Functional Mobility    Impairments Affecting Function (Mobility) shortness of breath;endurance/activity tolerance;balance  -SC               User Key  (r) = Recorded By, (t) = Taken By, (c) = Cosigned By      Initials Name Provider Type    Day Olmos OT Occupational Therapist                     Mobility/ADL's       [facility] Name 01/20/25 1137          Transfers    Transfers --  Pt demosntrated CGA for all functional transfers with FWW.  -[facility] Name 01/20/25 1137          Functional Mobility    Functional Mobility- Comment Patient able to ambulate to/from bathroom with FWW at CGA. Noted SOB with all activity/exertion.  -[facility] Name 01/20/25 1137          Activities of Daily Living    BADL Assessment/Intervention --  SBA-CGA for all BADLs. Noted SOB with all LB tasks. Educated patient on pacing self and taking rest breaks as needed.  -SC               User Key  (r) = Recorded By, (t) = Taken By, (c) = Cosigned By      Initials Name Provider Type    Day Olmos OT Occupational Therapist                   Obj/Interventions       Row Name 01/20/25 1140          Sensory Assessment (Somatosensory)    Sensory Assessment (Somatosensory) sensation intact  -[facility] Name 01/20/25 1140          Vision Assessment/Intervention    Visual Impairment/Limitations other (see  comments)  Patient reports her right peripheral vision is impaired due to stroke one year ago.  -SC       Row Name 01/20/25 1140          Range of Motion Comprehensive    General Range of Motion bilateral upper extremity ROM WFL  -[facility] Name 01/20/25 1140          Strength Comprehensive (MMT)    Comment, General Manual Muscle Testing (MMT) Assessment Gross UB strength 4/5 (biceps, triceps, and )  -SC       Row Name 01/20/25 1140          Motor Skills    Motor Skills functional endurance;coordination  -SC     Coordination WFL  -SC     Functional Endurance fair-  -SC       Row Name 01/20/25 1140          Balance    Balance Assessment standing static balance;standing dynamic balance  -SC     Static Standing Balance standby assist  -SC     Dynamic Standing Balance contact guard  -SC               User Key  (r) = Recorded By, (t) = Taken By, (c) = Cosigned By      Initials Name Provider Type    Day Olmos, OT Occupational Therapist                   Goals/Plan       [facility] Name 01/20/25 1159          Bed Mobility Goal 1 (OT)    Activity/Assistive Device (Bed Mobility Goal 1, OT) bed mobility activities, all  -SC     [place] Level/Cues Needed (Bed Mobility Goal 1, OT) modified independence  -SC     Time Frame (Bed Mobility Goal 1, OT) long term goal (LTG);10 days  -SC       Row Name 01/20/25 1159          Transfer Goal 1 (OT)    Activity/Assistive Device (Transfer Goal 1, OT) transfers, all  -SC     [place] Level/Cues Needed (Transfer Goal 1, OT) modified independence  -SC     Time Frame (Transfer Goal 1, OT) 10 days;long term goal (LTG)  -SC       Row Name 01/20/25 1152          Bathing Goal 1 (OT)    Activity/Device (Bathing Goal 1, OT) bathing skills, all  -SC     [place] Level/Cues Needed (Bathing Goal 1, OT) modified independence  -SC     Time Frame (Bathing Goal 1, OT) 10 days;long term goal (LTG)  -[facility] Name 01/20/25 1159          Dressing Goal 1 (OT)    Activity/Device  (Dressing Goal 1, OT) dressing skills, all  -SC     [place]/Cues Needed (Dressing Goal 1, OT) modified independence  -SC     Time Frame (Dressing Goal 1, OT) long term goal (LTG);10 days  -SC       Row Name 01/20/25 1159          Toileting Goal 1 (OT)    Activity/Device (Toileting Goal 1, OT) toileting skills, all  -SC     [place] Level/Cues Needed (Toileting Goal 1, OT) modified independence  -SC     Time Frame (Toileting Goal 1, OT) long term goal (LTG);10 days  -SC       Row Name 01/20/25 1159          Grooming Goal 1 (OT)    Activity/Device (Grooming Goal 1, OT) grooming skills, all  -SC     Time Frame (Grooming Goal 1, OT) long term goal (LTG);10 days  -SC       Row Name 01/20/25 1156          Strength Goal 1 (OT)    Strength Goal 1 (OT) Patient will improve upper body strength 4+/5 to support improved ADL function and mobility.  -SC     Time Frame (Strength Goal 1, OT) long term goal (LTG);10 days  -SC       Row Name 01/20/25 1153          Problem Specific Goal 1 (OT)    Problem Specific Goal 1 (OT) Patient will improve endurance to fair to support improved independence, function and safety in ADLs and during functional mobility.  -SC     Time Frame (Problem Specific Goal 1, OT) long term goal (LTG);10 days  -[facility] Name 01/20/25 1151          Therapy Assessment/Plan (OT)    Planned Therapy Interventions (OT) activity tolerance training;patient/caregiver education/training;occupation/activity based interventions;BADL retraining;strengthening exercise  -SC               User Key  (r) = Recorded By, (t) = Taken By, (c) = Cosigned By      Initials Name Provider Type    Day Olmos OT Occupational Therapist                   Clinical Impression       Row Name 01/20/25 1141          Pain Assessment    Pretreatment Pain Rating 0/10 - no pain  -SC     Posttreatment Pain Rating 0/10 - no pain  -SC       Row Name 01/20/25 1141          Plan of Care Review    Plan of Care Reviewed With patient  -SC      Progress no change  Evaluation  -SC     Outcome Evaluation Patient presents with limitations of endurance/activity tolerance which impede  her ability to perform ADLs/transfers as prior.  The skills of a therapist will be required to safely and effectively implement treatment plan to restore maximum level function.  -SC       Row Name 01/20/25 1141          Therapy Assessment/Plan (OT)    Rehab Potential (OT) good  -SC     Criteria for Skilled Therapeutic Interventions Met (OT) yes;meets criteria;skilled treatment is necessary  -SC     Therapy Frequency (OT) 5 times/wk  -SC       Row Name 01/20/25 1141          Therapy Plan Review/Discharge Plan (OT)    Anticipated Discharge Disposition (OT) home with assist;home with home health  Recommend HH or out patient program to address impaired activity tolerance for safe return to [facility].  -SC       Row Name 01/20/25 1141          Positioning and Restraints    Pre-Treatment Position sitting in chair/recliner  -SC     Post Treatment Position chair  -SC     In Chair call light within reach;encouraged to call for assist;exit alarm on  -SC               User Key  (r) = Recorded By, (t) = Taken By, (c) = Cosigned By      Initials Name Provider Type    SC Day Dong, OT Occupational Therapist                   Outcome Measures       Row Name 01/20/25 1200          How much help from another is currently needed...    Putting on and taking off regular lower body clothing? 4  -SC     Bathing (including washing, rinsing, and drying) 4  -SC     Toileting (which includes using toilet bed pan or urinal) 4  -SC     Putting on and taking off regular upper body clothing 4  -SC     Taking care of personal grooming (such as brushing teeth) 4  -SC     Eating meals 4  -SC     AM-PAC 6 Clicks Score (OT) 24  -SC       Row Name 01/20/25 0808 01/20/25 0500       How much help from another person do you currently need...    Turning from your back to your side while in flat bed without using  bedrails? 4  -JM 4  -GP    Moving from lying on back to sitting on the side of a flat bed without bedrails? 3  -JM 4  -GP    Moving to and from a bed to a chair (including a wheelchair)? 3  -JM 3  -GP    Standing up from a chair using your arms (e.g., wheelchair, bedside chair)? 3  -JM 3  -GP    Climbing 3-5 steps with a railing? 3  -JM 3  -GP    To walk in hospital room? 3  -JM 3  -GP    AM-PAC 6 Clicks Score (PT) 19  -JM 20  -GP      Row Name 01/20/25 1200          Functional Assessment    Outcome Measure Options AM-PAC 6 Clicks Daily Activity (OT);Optimal Instrument  -SC       Row Name 01/20/25 1200          Optimal Instrument    Optimal Instrument Optimal - 3  -SC     Bending/Stooping 2  -SC     Standing 1  -SC     Reaching 1  -SC     From the list, choose the 3 activities you would most like to be able to do without any difficulty Standing;Reaching;Bending/stooping  -SC     Total Score Optimal - 3 4  -SC               User Key  (r) = Recorded By, (t) = Taken By, (c) = Cosigned By      Initials Name Provider Type    GP Sherman Alicea, RN Registered Nurse    Keturah Galvan RNA Registered Nurse    Day Olmos, REMBERTO Occupational Therapist                    Occupational Therapy Education       Title: PT OT SLP Therapies (Done)       Topic: Occupational Therapy (Done)       Point: ADL training (Done)       Description:   Instruct learner(s) on proper safety adaptation and remediation techniques during self care or transfers.   Instruct in proper use of assistive devices.                  Learning Progress Summary            Patient Acceptance, E, VU by SC at 1/20/2025 1158                      Point: Home exercise program (Done)       Description:   Instruct learner(s) on appropriate technique for monitoring, assisting and/or progressing therapeutic exercises/activities.                  Learning Progress Summary            Patient Acceptance, E, VU by SC at 1/20/2025 1158                      Point: Precautions  (Done)       Description:   Instruct learner(s) on prescribed precautions during self-care and functional transfers.                  Learning Progress Summary            Patient Acceptance, E, VU by SC at 1/20/2025 1158                      Point: Body mechanics (Done)       Description:   Instruct learner(s) on proper positioning and spine alignment during self-care, functional mobility activities and/or exercises.                  Learning Progress Summary            Patient Acceptance, E, VU by SC at 1/20/2025 1158                                      User Key       Initials Effective Dates Name Provider Type Discipline    SC 02/05/24 -  Day Dong OT Occupational Therapist OT                  OT Recommendation and Plan  Planned Therapy Interventions (OT): activity tolerance training, patient/caregiver education/training, occupation/activity based interventions, BADL retraining, strengthening exercise  Therapy Frequency (OT): 5 times/wk  Plan of Care Review  Plan of Care Reviewed With: patient  Progress: no change (Evaluation)  Outcome Evaluation: Patient presents with limitations of endurance/activity tolerance which impede  her ability to perform ADLs/transfers as prior.  The skills of a therapist will be required to safely and effectively implement treatment plan to restore maximum level function.     Time Calculation:   Evaluation Complexity (OT)  Review Occupational Profile/Medical/Therapy History Complexity: expanded/moderate complexity  Assessment, Occupational Performance/Identification of Deficit Complexity: 1-3 performance deficits  Clinical Decision Making Complexity (OT): problem focused assessment/low complexity  Overall Complexity of Evaluation (OT): low complexity     Time Calculation- OT       Row Name 01/20/25 1201             Time Calculation- OT    OT Received On 01/20/25  -SC      OT Goal Re-Cert Due Date 01/29/25  -SC         Untimed Charges    OT Eval/Re-eval Minutes 32  -SC          Total Minutes    Untimed Charges Total Minutes 32  -SC       Total Minutes 32  -SC                User Key  (r) = Recorded By, (t) = Taken By, (c) = Cosigned By      Initials Name Provider Type    Day Olmos OT Occupational Therapist                  Therapy Charges for Today       Code Description Service Date Service Provider Modifiers Qty    [id number]  OT EVAL MOD COMPLEXITY 3 1/20/2025 Day Dong OT GO 1                 Day Dong OT  1/20/2025

## 2025-01-20 NOTE — PROGRESS NOTES
[facility]     Cardiology Progress Note    Patient Name: Michelle Atkinson  : 1945  MRN: [id number]  Primary Care Physician:  Samy Leon Jr., MD  Date of admission: 2025    Subjective   Subjective   Chief complaint  Chest pain    HPI:  Patient Reports no chest pain overnight.  She does note that her breathing is better.    Review of Systems   All systems were reviewed and negative except for: Cardiac review of systems negative.    Objective   Objective     Vitals:   Temp:  [97.3 °F (36.3 °C)-98.1 °F (36.7 °C)] 97.7 °F (36.5 °C)  Heart Rate:  [61-78] 68  Resp:  [16-17] 16  BP: (133-171)/(44-95) 133/44  Flow (L/min) (Oxygen Therapy):  [2] 2  Physical Exam   Neck: no JVD, no bruit  Lungs: clear to ausculation bilaterally.  No crackles or wheezes  CV: regular rate and rhythm, no murmur  Ext: no cyanosis, clubbing or edema.  Normal bilateral LE pulses.      Scheduled Meds:apixaban, 5 mg, Oral, Q12H  atorvastatin, 40 mg, Oral, Nightly  bisoprolol, 5 mg, Oral, Daily  cefTRIAXone, 1,000 mg, Intravenous, Q24H  clopidogrel, 75 mg, Oral, Daily  insulin lispro, 2-7 Units, Subcutaneous, 4x Daily AC & at Bedtime  pantoprazole, 40 mg, Oral, Q AM  sodium chloride, 10 mL, Intravenous, Q12H      Continuous Infusions:        Result Review    Result Review:  I have personally reviewed the results from the time of this admission to 2025 09:28 EST and agree with these findings:  [x]  Laboratory  []  Microbiology  [x]  Radiology  [x]  EKG/Telemetry   [x]  Cardiology/Vascular   []  Pathology  []  Old records  []  Other:  Most notable findings include:     CBC          2025    14:53 2025    03:08 2025    04:38   CBC   WBC 6.04  7.00  7.99    RBC 4.41  3.71  3.70    Hemoglobin 12.6  10.5  10.5    Hematocrit 40.6  33.8  33.9    MCV 92.1  91.1  91.6    MCH 28.6  28.3  28.4    MCHC 31.0  31.1  31.0    RDW 15.8  15.8  16.4    Platelets 169  167  149      CMP          2025    04:15 2025     03:49 1/20/2025    04:38   CMP   Glucose 120  95  75    BUN 39  32  30    Creatinine 1.44  1.52  1.35    EGFR 36.8  34.5  39.8    Sodium 136  138  137    Potassium 4.3  3.8  4.0    Chloride 107  107  107    Calcium 8.6  9.2  8.9    Albumin  3.1  2.7    BUN/Creatinine Ratio 27.1  21.1  22.2    Anion Gap 10.5  11.6  11.3       CARDIAC LABS:      Lab 01/18/25  0415 01/17/25  2132 01/17/25  2107 01/17/25  1845 01/17/25  1511   PROBNP  --   --   --   --  1,748.0   HSTROP T 1,821* 1,511*  --  823*  --    PROTIME  --   --  15.2*  --   --    INR  --   --  1.18*  --   --         Assessment & Plan   Assessment / Plan     Brief Patient Summary:  Michelle Atkinson is a 79 y.o. female who has a history of remote RCA stents. She has a history of atrial fibrillation on Eliquis at home. She presented with chest pressure and had a type I non-STEMI due to plaque rupture. She is currently chest pain-free.     Active Hospital Problems:  Active Hospital Problems    Diagnosis     **NSTEMI (non-ST elevated myocardial infarction)        Assessment:  1.  Type I non-STEMI due to plaque rupture  2.  Known coronary artery disease  3.  Chronic kidney disease  4.  History of atrial fibrillation.     Plan:   1.  Continue the aspirin.  Patient got loaded on Brilinta in the Cath Lab.  Patient will get 600 mg of Plavix today then 75 mg p.o. daily.  Brilinta has been discontinued.  She will take an 81 mg aspirin for 1 month and then stop it.  Would start her Eliquis back tomorrow.    3.  Patient had severe distal LAD stenosis and received 1 drug-eluting stent.    4.  Continue the Lipitor and bisoprolol.  5.  Reviewed patient's echocardiogram.  It does look like she has inferoposterior/apical hypokinesis.  Her overall ejection fraction appears about 50%.  She has no significant valvular disease.  6.  EKG shows atrial paced rhythm.  At the time it was done and there is slight ST elevation in the anterior leads that fails to meet criteria for STEMI.  7.   Patient needs follow-up with her cardiologist in [place] and 2 weeks postdischarge.  I do think she can be discharged from a cardiac standpoint.       CODE STATUS:   Code Status (Patient has no pulse and is not breathing): CPR (Attempt to Resuscitate)  Medical Interventions (Patient has pulse or is breathing): Full Support      Electronically signed by James Damon MD, 01/20/25, 9:28 AM EST.

## 2025-01-21 NOTE — OUTREACH NOTE
Prep Survey      Flowsheet Row Responses   [facility] facility patient discharged from? [facility]   Is LACE score < 7 ? No   Eligibility Readm Mgmt   Discharge diagnosis NSTEMI-Left heart cath this visit   Does the patient have one of the following disease processes/diagnoses(primary or secondary)? Acute MI (STEMI,NSTEMI)   Does the patient have Home health ordered? No   Is there a DME ordered? No   Prep survey completed? Yes            LAYNE MARQUEZ - Registered Nurse

## 2025-01-28 ENCOUNTER — READMISSION MANAGEMENT (OUTPATIENT)
Dept: CALL CENTER | Facility: HOSPITAL | Age: 80
End: 2025-01-28
Payer: MEDICARE

## 2025-01-28 NOTE — OUTREACH NOTE
AMI Week 1 Survey      Flowsheet Row Responses   [facility] facility patient discharged from? [facility]   Does the patient have one of the following disease processes/diagnoses(primary or secondary)? Acute MI (STEMI,NSTEMI)   Week 1 attempt successful? Yes   Call start time 1038   Revoke Readmitted  [States she is in [place] [facility]  Has afib, fluid on lungs and kidney issues.]   Discharge diagnosis NSTEMI-Left heart cath this visit            Angela MIGUEL - Licensed Nurse

## 2025-01-30 LAB
QT INTERVAL: 420 MS
QT INTERVAL: 545 MS
QTC INTERVAL: 431 MS
QTC INTERVAL: 554 MS

## 2025-02-04 ENCOUNTER — TELEPHONE (OUTPATIENT)
Dept: CARDIOLOGY | Facility: CLINIC | Age: 80
End: 2025-02-04
Payer: MEDICARE

## 2025-02-04 NOTE — TELEPHONE ENCOUNTER
Remote transmission reviewed. Device alerting for ongoing Afib since 1/30/25 at 12:04 pm.  V-rates appear to be controlled.  History of PAF, on Eliquis, beta blocker.    Patient was admitted to [facility], 1/17/25-1/20/25, for NSTEMI, stent to distal LAD.  She has a hospital follow up on 2/6/25 with Dr. AUSTIN

## 2025-02-05 NOTE — PROGRESS NOTES
Device alerting for ongoing Afib since 1/30/25 at 12:04 pm.  V-rates appear to be controlled.  History of PAF, on Eliquis, beta blocker.     Patient was admitted to [facility], 1/17/25-1/20/25, for NSTEMI, stent to distal LAD.   Subjective:        Michelle Atkinson is a 79 y.o. female who here for follow up    CC  RECENT MI  Atrial fibrillation  HPI  79-year-old female recently had myocardial infarction now in atrial fibrillation complains of shortness of breath on exertion     Problems Addressed this Visit          Cardiac and Vasculature    Abnormal EKG - Primary    Relevant Orders    Adult Transthoracic Echo Complete W/ Cont if Necessary Per Protocol    Coronary artery disease of native artery of native heart with stable angina pectoris    Relevant Medications    midodrine (PROAMATINE) 5 MG tablet    bisoprolol (ZEBeta) 5 MG tablet    Other Relevant Orders    Adult Transthoracic Echo Complete W/ Cont if Necessary Per Protocol    Paroxysmal atrial fibrillation    Relevant Medications    midodrine (PROAMATINE) 5 MG tablet    bisoprolol (ZEBeta) 5 MG tablet    Hypotension     Diagnoses         Codes Comments    Abnormal EKG    -  Primary ICD-10-CM: R94.31  ICD-9-CM: 794.31     Coronary artery disease of native artery of native heart with stable angina pectoris     ICD-10-CM: I25.118  ICD-9-CM: 414.01, 413.9     Paroxysmal atrial fibrillation     ICD-10-CM: I48.0  ICD-9-CM: 427.31     Hypotension, unspecified hypotension type     ICD-10-CM: I95.9  ICD-9-CM: 458.9           .    The following portions of the patient's history were reviewed and updated as appropriate: allergies, current medications, past family history, past medical history, past social history, past surgical history and problem list.    Past Medical History:   Diagnosis Date    CAD (coronary artery disease)     Essential (primary) hypertension     Ex-cigarette smoker     Quit 1992    Lung cancer 2016    S/P Radiation and chemotherapy    Paroxysmal  "atrial fibrillation     Sick sinus syndrome 04/2024    Stroke 11/04/2023    Type 2 diabetes mellitus      reports that she has quit smoking. Her smoking use included cigarettes. She has never used smokeless tobacco. She reports that she does not currently use alcohol. She reports that she does not use drugs.   Family History   Problem Relation Age of Onset    Heart disease Mother     Heart disease Father     Heart disease Brother        Review of Systems  Constitutional: No wt loss, fever, fatigue  Gastrointestinal: No nausea, abdominal pain  Behavioral/Psych: No insomnia or anxiety   Cardiovascular shortness of breath on exertion  Objective:       Physical Exam  BP 99/65   Pulse 109   Ht 157.5 cm (62.01\")   Wt 71.2 kg (157 lb)   BMI 28.71 kg/m²   General appearance: No acute changes   Neck: Trachea midline; NECK, supple, no thyromegaly or lymphadenopathy   Lungs: Normal size and shape, normal breath sounds, equal distribution of air, no rales and rhonchi   CV: S1-S2 regular, no murmurs, no rub, no gallop   Abdomen: Soft, nontender; no masses , no abnormal abdominal sounds   Extremities: No deformity , normal color , no peripheral edema   Skin: Normal temperature, turgor and texture; no rash, ulcers            ECG 12 Lead    Date/Time: 2/6/2025 1:16 PM  Performed by: Kath Smith MD    Authorized by: Kath Smith MD  Comparison: compared with previous ECG   Similar to previous ECG  Rhythm: sinus rhythm    Clinical impression: abnormal EKG            Echocardiogram:    Results for orders placed during the hospital encounter of 01/17/25    Adult Transthoracic Echo Complete W/ Cont if Necessary Per Protocol    Interpretation Summary    Left ventricular ejection fraction appears approximately 50%.  Inferoapical hypokinesis noted.    Left ventricular diastolic function is consistent with (grade I) impaired relaxation and age.    Estimated right ventricular systolic pressure from tricuspid " regurgitation is mildly elevated (35-45 mmHg).    Mild to early moderate mitral and tricuspid regurgitation.    Pleural effusion noted.          Current Outpatient Medications:     alendronate (FOSAMAX) 70 MG tablet, Take 1 tablet by mouth Every 7 (Seven) Days. SUN, Disp: , Rfl:     amiodarone (PACERONE) 200 MG tablet, Take 1.5 tablets by mouth Daily., Disp: , Rfl:     apixaban (ELIQUIS) 5 MG tablet tablet, Take 1 tablet by mouth Every 12 (Twelve) Hours. Indications: Atrial Fibrillation, Disp: 180 tablet, Rfl: 3    aspirin 81 MG EC tablet, Take 1 tablet by mouth Daily for 30 days., Disp: 30 tablet, Rfl: 0    atorvastatin (LIPITOR) 40 MG tablet, Take 1 tablet by mouth Every Night., Disp: , Rfl:     bisoprolol (ZEBeta) 5 MG tablet, Take 1 tablet by mouth Daily., Disp: 30 tablet, Rfl: 6    bumetanide (BUMEX) 2 MG tablet, Take 1 tablet by mouth 2 (Two) Times a Day., Disp: , Rfl:     clopidogrel (PLAVIX) 75 MG tablet, Take 1 tablet by mouth Daily for 30 days., Disp: 30 tablet, Rfl: 0    glipizide (GLUCOTROL) 10 MG tablet, Take 2 tablets by mouth 2 (Two) Times a Day Before Meals., Disp: , Rfl:     midodrine (PROAMATINE) 5 MG tablet, Take 1 tablet by mouth 3 (Three) Times a Day Before Meals., Disp: , Rfl:     pantoprazole (PROTONIX) 20 MG EC tablet, Take 1 tablet by mouth Every Morning. (Replaces omeprazole), Disp: 30 tablet, Rfl: 0    pioglitazone (ACTOS) 30 MG tablet, Take 1 tablet by mouth Daily., Disp: , Rfl:     potassium chloride 10 MEQ CR tablet, Take 1 tablet by mouth Daily., Disp: 90 tablet, Rfl: 2   Assessment:                Plan:          ICD-10-CM ICD-9-CM   1. Abnormal EKG  R94.31 794.31   2. Coronary artery disease of native artery of native heart with stable angina pectoris  I25.118 414.01     413.9   3. Paroxysmal atrial fibrillation  I48.0 427.31   4. Hypotension, unspecified hypotension type  I95.9 458.9     1. Abnormal EKG  Considering patient's medical condition as well as the risk factors, patient  will require echocardiogram for further evaluation for the LV function, four-chamber evaluation, including the pressures, valvular function and  pericardial disease and pericardial effusion    - Adult Transthoracic Echo Complete W/ Cont if Necessary Per Protocol; Future    2. Coronary artery disease of native artery of native heart with stable angina pectoris  No angina pectoris  - Adult Transthoracic Echo Complete W/ Cont if Necessary Per Protocol; Future    3. Paroxysmal atrial fibrillation  Under control    4. Hypotension, unspecified hypotension type  Change the medication    4.  Chronic anticoagulation  Pros and cons as well as indication of the anticoagulation has been explained to the patient in detail    There are no obvious complications at this stage    Risk of  the bleedings has been explained    Need for the regular blood workup and adjust the dose has been explained    Need for proper follow-up on anticoagulation also has been explained      DC CARDIAZEM  DC METOPROLOL  START ZEBETA 5 MG  INCREASE ELLOQUISE 5 MG BID  1 MONTH WITH ECHO AND EKG FOR CV  COUNSELING:    Michelle Coffey was given to patient for the following topics: diagnostic results, risk factor reductions, impressions, risks and benefits of treatment options and importance of treatment compliance .       SMOKING COUNSELING:        Dictated using Dragon dictation

## 2025-02-06 ENCOUNTER — OFFICE VISIT (OUTPATIENT)
Dept: CARDIOLOGY | Facility: CLINIC | Age: 80
End: 2025-02-06
Payer: MEDICARE

## 2025-02-06 VITALS
BODY MASS INDEX: 28.89 KG/M2 | WEIGHT: 157 LBS | HEIGHT: 62 IN | SYSTOLIC BLOOD PRESSURE: 99 MMHG | DIASTOLIC BLOOD PRESSURE: 65 MMHG | HEART RATE: 109 BPM

## 2025-02-06 DIAGNOSIS — I48.0 PAROXYSMAL ATRIAL FIBRILLATION: ICD-10-CM

## 2025-02-06 DIAGNOSIS — R94.31 ABNORMAL EKG: Primary | ICD-10-CM

## 2025-02-06 DIAGNOSIS — I25.118 CORONARY ARTERY DISEASE OF NATIVE ARTERY OF NATIVE HEART WITH STABLE ANGINA PECTORIS: ICD-10-CM

## 2025-02-06 DIAGNOSIS — I95.9 HYPOTENSION, UNSPECIFIED HYPOTENSION TYPE: ICD-10-CM

## 2025-02-06 PROCEDURE — 93000 ELECTROCARDIOGRAM COMPLETE: CPT | Performed by: INTERNAL MEDICINE

## 2025-02-06 PROCEDURE — 99214 OFFICE O/P EST MOD 30 MIN: CPT | Performed by: INTERNAL MEDICINE

## 2025-02-06 PROCEDURE — 3078F DIAST BP <80 MM HG: CPT | Performed by: INTERNAL MEDICINE

## 2025-02-06 PROCEDURE — 3074F SYST BP LT 130 MM HG: CPT | Performed by: INTERNAL MEDICINE

## 2025-02-06 RX ORDER — DILTIAZEM HCL 60 MG
60 TABLET ORAL 3 TIMES DAILY
COMMUNITY
End: 2025-02-06 | Stop reason: ALTCHOICE

## 2025-02-06 RX ORDER — BUMETANIDE 2 MG/1
2 TABLET ORAL 2 TIMES DAILY
COMMUNITY

## 2025-02-06 RX ORDER — MIDODRINE HYDROCHLORIDE 5 MG/1
5 TABLET ORAL
COMMUNITY

## 2025-02-06 RX ORDER — AMIODARONE HYDROCHLORIDE 200 MG/1
300 TABLET ORAL DAILY
COMMUNITY

## 2025-02-06 RX ORDER — BISOPROLOL FUMARATE 5 MG/1
5 TABLET, FILM COATED ORAL DAILY
Qty: 30 TABLET | Refills: 6 | Status: SHIPPED | OUTPATIENT
Start: 2025-02-06 | End: 2025-02-17 | Stop reason: SDUPTHER

## 2025-02-17 ENCOUNTER — TELEPHONE (OUTPATIENT)
Dept: CARDIOLOGY | Facility: CLINIC | Age: 80
End: 2025-02-17
Payer: MEDICARE

## 2025-02-17 ENCOUNTER — TELEPHONE (OUTPATIENT)
Dept: CARDIOLOGY | Facility: CLINIC | Age: 80
End: 2025-02-17

## 2025-02-17 RX ORDER — BISOPROLOL FUMARATE 5 MG/1
5 TABLET, FILM COATED ORAL DAILY
Qty: 90 TABLET | Refills: 3 | Status: SHIPPED | OUTPATIENT
Start: 2025-02-17

## 2025-02-17 NOTE — TELEPHONE ENCOUNTER
Caller: Michelle Atkinson     Relationship:SELF  Best call back number: [phone]    What is your medical concern? LOW BLOOD PRESSURE 97/67    How long has this issue been going on? COUPLE DAYS    Is your provider already aware of this issue? YES    Have you been treated for this issue? NO

## 2025-02-17 NOTE — TELEPHONE ENCOUNTER
Caller: DIPTI SHUKLA    Relationship:SELF    Callback number: [phone]   Is it ok to leave a message: [x] Yes [] No    Requested medication for samples: ELIQUIS    How much medication does the patient currently have left: THREE DAYS    Who will be picking up the samples: SELF    Do you need information about patient financial assistance for this medication: [] Yes [x] No    Additional details provided: POPLAR

## 2025-02-18 NOTE — TELEPHONE ENCOUNTER
PATIENT B/P IS RUNNING 76-89/50-60 HR 70s    On BISOPROLOL 5 AND  AMIO 200      SPOKE W /AP SHE SAID HAVE HER STOP BISOPROLOL   AND MONITOR HE BP -IF IT STARTS GETTING HIGH AGAIN LET US KNOW -PATIENT UNDERSTOOD AND AGREED

## 2025-03-07 ENCOUNTER — HOSPITAL ENCOUNTER (INPATIENT)
Facility: HOSPITAL | Age: 80
LOS: 1 days | Discharge: HOME | DRG: 683 | End: 2025-03-09
Attending: EMERGENCY MEDICINE | Admitting: INTERNAL MEDICINE
Payer: MEDICARE

## 2025-03-07 ENCOUNTER — TELEPHONE (OUTPATIENT)
Dept: CARDIOLOGY | Facility: CLINIC | Age: 80
End: 2025-03-07

## 2025-03-07 DIAGNOSIS — N17.9 AKI (ACUTE KIDNEY INJURY): Primary | ICD-10-CM

## 2025-03-07 LAB — HOLD SPECIMEN: NORMAL

## 2025-03-07 PROCEDURE — 99285 EMERGENCY DEPT VISIT HI MDM: CPT

## 2025-03-07 PROCEDURE — 82550 ASSAY OF CK (CPK): CPT | Performed by: STUDENT IN AN ORGANIZED HEALTH CARE EDUCATION/TRAINING PROGRAM

## 2025-03-07 PROCEDURE — 80053 COMPREHEN METABOLIC PANEL: CPT

## 2025-03-07 PROCEDURE — 85025 COMPLETE CBC W/AUTO DIFF WBC: CPT

## 2025-03-07 NOTE — ED PROVIDER NOTES
Time: 6:38 PM EST  Date of encounter:  3/7/2025  Independent Historian/Clinical History and Information was obtained by:   Patient    History is limited by: N/A    Chief Complaint   Patient presents with    Abnormal Lab     Patient reports having blood work a [facility] in [facility] and was told she was in kidney failure and to go to ER.  Patient reports decreased urination with no other symptoms.         History of Present Illness:  Patient is a 79 y.o. year old female who presents to the emergency department for evaluation of abnormal lab.  Patient was seen by her PCP today and had blood work done and she was found to be in kidney failure.  Patient has history of kidney problems but has not been in failure yet.  Patient was going to follow-up with the nephrologist however he did not answer and her primary advised her to come to the emergency department.  (Bailey Seaver, APRN, FNP-C)    States her blood sugar about was also elevated when she received a phone call but states she had just eaten lunch prior to getting her labs drawn.  He states she was also having some urinary frequency.  She denies recent use of NSAIDs.  Denies nausea, vomiting, fevers, diarrhea lightheadedness or dizziness.  Mitts to some weakness.      Patient Care Team  Primary Care Provider: Samy Leon Jr., MD    Past Medical History:     Allergies   Allergen Reactions    Codeine Nausea And Vomiting     Past Medical History:   Diagnosis Date    CAD (coronary artery disease)     Essential (primary) hypertension     Ex-cigarette smoker     Quit 1992    Lung cancer 2016    S/P Radiation and chemotherapy    Paroxysmal atrial fibrillation     Sick sinus syndrome 04/2024    Stroke 11/04/2023    Type 2 diabetes mellitus      Past Surgical History:   Procedure Laterality Date    CARDIAC CATHETERIZATION N/A 01/31/2024    Procedure: Right and Left Heart Cath;  Surgeon: Kath Smith MD;  Location: [facility] CATH INVASIVE LOCATION;   Service: Cardiovascular;  Laterality: N/A;    CARDIAC CATHETERIZATION N/A 01/31/2024    Procedure: Right Heart Cath;  Surgeon: Kath Smith MD;  Location: [facility] CATH INVASIVE LOCATION;  Service: Cardiovascular;  Laterality: N/A;    CARDIAC CATHETERIZATION N/A 01/31/2024    Procedure: Left ventriculography;  Surgeon: Kath Smith MD;  Location: [facility] CATH INVASIVE LOCATION;  Service: Cardiovascular;  Laterality: N/A;    CARDIAC CATHETERIZATION N/A 1/19/2025    Procedure: Left Heart Cath;  Surgeon: James Damon MD;  Location: [facility] CATH INVASIVE LOCATION;  Service: Cardiology;  Laterality: N/A;    CARDIAC ELECTROPHYSIOLOGY PROCEDURE N/A 10/18/2023    Procedure: Loop insertion- [place];  Surgeon: Kath Smith MD;  Location: [facility] CATH INVASIVE LOCATION;  Service: Cardiovascular;  Laterality: N/A;    CARDIAC ELECTROPHYSIOLOGY PROCEDURE N/A 4/24/2024    Procedure: Pacemaker DC new biotronik;  Surgeon: Kath Smith MD;  Location: [facility] CATH INVASIVE LOCATION;  Service: Cardiovascular;  Laterality: N/A;  BIOTRONIK    CARDIAC ELECTROPHYSIOLOGY PROCEDURE N/A 4/24/2024    Procedure: Loop recorder removal;  Surgeon: Kath Smith MD;  Location: [facility] CATH INVASIVE LOCATION;  Service: Cardiovascular;  Laterality: N/A;    CATARACT EXTRACTION Bilateral     CORONARY STENT PLACEMENT      2002 and 2007 stent placement in [place], [place]    VAGINAL HYSTERECTOMY      1970's     Family History   Problem Relation Age of Onset    Heart disease Mother     Heart disease Father     Heart disease Brother        Home Medications:  Prior to Admission medications    Medication Sig Start Date End Date Taking? Authorizing Provider   alendronate (FOSAMAX) 70 MG tablet Take 1 tablet by mouth Every 7 (Seven) Days. SUN 8/11/23   Provider, MD Sharon   amiodarone (PACERONE) 200 MG tablet Take 1.5 tablets by mouth Daily.    ProviderSharon MD   apixaban (ELIQUIS) 5 MG tablet  "tablet Take 1 tablet by mouth Every 12 (Twelve) Hours. Indications: Atrial Fibrillation 2/6/25   Kath Smith MD   atorvastatin (LIPITOR) 40 MG tablet Take 1 tablet by mouth Every Night. 9/19/23   Sharon Barajas MD   bisoprolol (ZEBeta) 5 MG tablet Take 1 tablet by mouth Daily. 2/17/25   Kath Smith MD   bumetanide (BUMEX) 2 MG tablet Take 1 tablet by mouth 2 (Two) Times a Day.    Sharon Barajas MD   glipizide (GLUCOTROL) 10 MG tablet Take 2 tablets by mouth 2 (Two) Times a Day Before Meals.    Sharon Barajas MD   midodrine (PROAMATINE) 5 MG tablet Take 1 tablet by mouth 3 (Three) Times a Day Before Meals.    Sharon Barajas MD   pantoprazole (PROTONIX) 20 MG EC tablet Take 1 tablet by mouth Every Morning. (Replaces omeprazole) 1/21/25   Nikunj Kumar MD   pioglitazone (ACTOS) 30 MG tablet Take 1 tablet by mouth Daily. 8/6/23   Sharon Barajas MD   potassium chloride 10 MEQ CR tablet Take 1 tablet by mouth Daily. 2/20/24   Kath Smith MD        Social History:   Social History     Tobacco Use    Smoking status: Former     Types: Cigarettes    Smokeless tobacco: Never   Vaping Use    Vaping status: Never Used   Substance Use Topics    Alcohol use: Not Currently    Drug use: Never         Review of Systems:  Review of Systems   Constitutional:  Positive for fatigue.   Genitourinary:  Positive for dysuria and frequency.   Neurological:  Positive for weakness.        Physical Exam:  /70   Pulse 65   Temp 97.3 °F (36.3 °C) (Oral)   Resp 16   Ht 157.5 cm (62\")   SpO2 97%   BMI 28.72 kg/m²         Physical Exam  Vitals and nursing note reviewed.   Constitutional:       Appearance: Normal appearance.   HENT:      Head: Normocephalic and atraumatic.      Nose: Nose normal.      Mouth/Throat:      Mouth: Mucous membranes are moist.   Eyes:      Extraocular Movements: Extraocular movements intact.      Conjunctiva/sclera: Conjunctivae normal.      " Pupils: Pupils are equal, round, and reactive to light.   Cardiovascular:      Rate and Rhythm: Normal rate and regular rhythm.      Heart sounds: Normal heart sounds.   Pulmonary:      Effort: Pulmonary effort is normal.      Breath sounds: Normal breath sounds.   Musculoskeletal:         General: Normal range of motion.      Cervical back: Normal range of motion and neck supple.   Skin:     General: Skin is warm and dry.   Neurological:      General: No focal deficit present.      Mental Status: She is alert and oriented to person, place, and time.   Psychiatric:         Mood and Affect: Mood normal.         Behavior: Behavior normal.                    Medical Decision Making:      Comorbidities that affect care:    Atrial Fibrillation, Chronic Kidney Disease, Coronary Artery Disease, Diabetes, Hypertension    External Notes reviewed:    Previous Clinic Note: Cardiology office visit 2/6/2025.  Upcoming nephrology appointment 3/13/2025 and Previous Labs: BMP from 3/7/2025 at 1:27 PM showing a glucose of 485, BUN 47, creatinine 3.1, sodium 131..  Patient's renal panel from 1/20/2025 showing BUN of 30, creatinine 1.3 and a GFR of 39 her UA from 3/7/2025 showing 3+ glucose negative for nitrites, 3+ leukocytes and 2+ bacteria.  In November patient saw [facility] 11/8/2024 for CKD stage IV.  Kd due to hypertension.    The following orders were placed and all results were independently analyzed by me:  Orders Placed This Encounter   Procedures    Comprehensive Metabolic Panel    Urinalysis With Culture If Indicated - Urine, Clean Catch    CBC Auto Differential    CK    Inpatient Hospitalist Consult    Extra Tubes    Green Top (Gel)    CBC & Differential       Medications Given in the Emergency Department:  Medications   sodium chloride 0.9 % bolus 1,000 mL (1,000 mL Intravenous New Bag 3/8/25 0043)   cefTRIAXone (ROCEPHIN) in NS 2 GRAMS/20ml IV PUSH syringe (has no administration in time  range)        ED Course:    The patient was initially evaluated in the triage area where orders were placed. The patient was later dispositioned by Chantal Plascencia PA-C.      The patient was advised to stay for completion of workup which includes but is not limited to communication of labs and radiological results, reassessment and plan. The patient was advised that leaving prior to disposition by a provider could result in critical findings that are not communicated to the patient.     ED Course as of 03/08/25 0050   Fri Mar 07, 2025   1839 PROVIDER IN TRIAGE  Patient was evaluated by me in triage, Bailey Seaver, APRN, GRECIA.  Orders were placed and patient is currently awaiting final results and disposition.   [AS]      ED Course User Index  [AS] Seaver, Alyce B, APRN       Labs:    Lab Results (last 24 hours)       Procedure Component Value Units Date/Time    Comprehensive Metabolic Panel [[id number]]  (Abnormal) Collected: 03/07/25 2357    Specimen: Blood from Arm, Left Updated: 03/08/25 0018     Glucose 348 mg/dL      BUN 50 mg/dL      Creatinine 2.99 mg/dL      Sodium 137 mmol/L      Potassium 3.8 mmol/L      Comment: Slight hemolysis detected by analyzer. Result may be falsely elevated.        Chloride 92 mmol/L      CO2 27.5 mmol/L      Calcium 9.9 mg/dL      Total Protein 7.3 g/dL      Albumin 3.7 g/dL      ALT (SGPT) 15 U/L      AST (SGOT) 28 U/L      Alkaline Phosphatase 76 U/L      Total Bilirubin 0.8 mg/dL      Globulin 3.6 gm/dL      A/G Ratio 1.0 g/dL      BUN/Creatinine Ratio 16.7     Anion Gap 17.5 mmol/L      eGFR 15.3 mL/min/1.73     Narrative:      GFR Categories in Chronic Kidney Disease (CKD)      GFR Category          GFR (mL/min/1.73)    Interpretation  G1                     90 or greater         Normal or high (1)  G2                      60-89                Mild decrease (1)  G3a                   45-59                Mild to moderate decrease  G3b                   30-44                 Moderate to severe decrease  G4                    15-29                Severe decrease  G5                    14 or less           Kidney failure          (1)In the absence of evidence of kidney disease, neither GFR category G1 or G2 fulfill the criteria for CKD.    eGFR calculation 2021 CKD-EPI creatinine equation, which does not include race as a factor    CBC & Differential [[id number]]  (Normal) Collected: 03/07/25 2357    Specimen: Blood Updated: 03/08/25 0000    Narrative:      The following orders were created for panel order CBC & Differential.  Procedure                               Abnormality         Status                     ---------                               -----------         ------                     CBC Auto Differential[[id number]]        Normal              Final result                 Please view results for these tests on the individual orders.    CBC Auto Differential [[id number]]  (Normal) Collected: 03/07/25 2357    Specimen: Blood Updated: 03/08/25 0000     WBC 6.21 10*3/mm3      RBC 4.30 10*6/mm3      Hemoglobin 12.5 g/dL      Hematocrit 38.7 %      MCV 90.0 fL      MCH 29.1 pg      MCHC 32.3 g/dL      RDW 15.3 %      RDW-SD 50.3 fl      MPV 11.9 fL      Platelets 202 10*3/mm3      Neutrophil % 58.3 %      Lymphocyte % 30.8 %      Monocyte % 8.5 %      Eosinophil % 1.3 %      Basophil % 0.6 %      Immature Grans % 0.5 %      Neutrophils, Absolute 3.62 10*3/mm3      Lymphocytes, Absolute 1.91 10*3/mm3      Monocytes, Absolute 0.53 10*3/mm3      Eosinophils, Absolute 0.08 10*3/mm3      Basophils, Absolute 0.04 10*3/mm3      Immature Grans, Absolute 0.03 10*3/mm3      nRBC 0.0 /100 WBC              Imaging:    No Radiology Exams Resulted Within Past 24 Hours      Differential Diagnosis and Discussion:      Dysuria: Differential diagnosis includes but is not limited to urethritis, cystitis, pyelonephritis, ureteral calculi, neoplasm, chemical irritant, urethral stricture, and  trauma    PROCEDURES:    Labs were collected in the emergency department and all labs were reviewed and interpreted by me.    No orders to display        Procedures    MDM     Amount and/or Complexity of Data Reviewed  Clinical lab tests: reviewed                     Patient Care Considerations:    SEPSIS was considered but is NOT present in the emergency department as SIRS criteria is not present.      Consultants/Shared Management Plan:    Hospitalist: I have discussed the case with Dr. Hernandez who agrees to accept the patient for admission.    Social Determinants of Health:    Patient is independent, reliable, and has access to care.       Disposition and Care Coordination:    Admit:   Through independent evaluation of the patient's history, physical, and imperical data, the patient meets criteria for inpatient admission to the hospital.        Final diagnoses:   HAYLEE (acute kidney injury)        ED Disposition       ED Disposition   Decision to Admit    Condition   --    Comment   --               This medical record created using voice recognition software.             Chantal Plascencia PA-C  03/08/25 0050

## 2025-03-07 NOTE — TELEPHONE ENCOUNTER
Caller: DIPTI SHUKLA    Relationship:SELF    Callback number: [phone]   Is it ok to leave a message: [] Yes [x] No    Requested medication for samples: apixaban (ELIQUIS) 5 MG tablet tablet     How much medication does the patient currently have left: ENOUGH FOR NEXT WEEK    Who will be picking up the samples: PATIENT WILL     Do you need information about patient financial assistance for this medication: [] Yes [x] No    Additional details provided: PT WILL BE NEAR THERE ON MONDAY AND WANTED TO KNOW IF THERE WERE ANY SAMPLES AVAILABLE. PLEASE CALL IF NO SAMPLES ARE AVAILABLE.

## 2025-03-08 ENCOUNTER — APPOINTMENT (OUTPATIENT)
Dept: GENERAL RADIOLOGY | Facility: HOSPITAL | Age: 80
DRG: 683 | End: 2025-03-08
Payer: MEDICARE

## 2025-03-08 ENCOUNTER — APPOINTMENT (OUTPATIENT)
Dept: ULTRASOUND IMAGING | Facility: HOSPITAL | Age: 80
DRG: 683 | End: 2025-03-08
Payer: MEDICARE

## 2025-03-08 PROBLEM — N17.9 AKI (ACUTE KIDNEY INJURY): Status: ACTIVE | Noted: 2025-03-08

## 2025-03-08 LAB
ALBUMIN SERPL-MCNC: 3 G/DL (ref 3.5–5.2)
ALBUMIN SERPL-MCNC: 3.7 G/DL (ref 3.5–5.2)
ALBUMIN/GLOB SERPL: 0.8 G/DL
ALBUMIN/GLOB SERPL: 1 G/DL
ALP SERPL-CCNC: 66 U/L (ref 39–117)
ALP SERPL-CCNC: 76 U/L (ref 39–117)
ALT SERPL W P-5'-P-CCNC: 12 U/L (ref 1–33)
ALT SERPL W P-5'-P-CCNC: 15 U/L (ref 1–33)
ANION GAP SERPL CALCULATED.3IONS-SCNC: 17.5 MMOL/L (ref 5–15)
ANION GAP SERPL CALCULATED.3IONS-SCNC: 18.2 MMOL/L (ref 5–15)
AST SERPL-CCNC: 26 U/L (ref 1–32)
AST SERPL-CCNC: 28 U/L (ref 1–32)
BACTERIA UR QL AUTO: ABNORMAL /HPF
BASOPHILS # BLD AUTO: 0.04 10*3/MM3 (ref 0–0.2)
BASOPHILS # BLD AUTO: 0.06 10*3/MM3 (ref 0–0.2)
BASOPHILS NFR BLD AUTO: 0.6 % (ref 0–1.5)
BASOPHILS NFR BLD AUTO: 1 % (ref 0–1.5)
BILIRUB SERPL-MCNC: 0.5 MG/DL (ref 0–1.2)
BILIRUB SERPL-MCNC: 0.8 MG/DL (ref 0–1.2)
BILIRUB UR QL STRIP: NEGATIVE
BUN SERPL-MCNC: 45 MG/DL (ref 8–23)
BUN SERPL-MCNC: 50 MG/DL (ref 8–23)
BUN/CREAT SERPL: 16.7 (ref 7–25)
BUN/CREAT SERPL: 17.8 (ref 7–25)
CALCIUM SPEC-SCNC: 8.6 MG/DL (ref 8.6–10.5)
CALCIUM SPEC-SCNC: 9.9 MG/DL (ref 8.6–10.5)
CHLORIDE SERPL-SCNC: 92 MMOL/L (ref 98–107)
CHLORIDE SERPL-SCNC: 94 MMOL/L (ref 98–107)
CK SERPL-CCNC: 61 U/L (ref 20–180)
CLARITY UR: ABNORMAL
CO2 SERPL-SCNC: 20.8 MMOL/L (ref 22–29)
CO2 SERPL-SCNC: 27.5 MMOL/L (ref 22–29)
COLOR UR: YELLOW
CREAT SERPL-MCNC: 2.53 MG/DL (ref 0.57–1)
CREAT SERPL-MCNC: 2.99 MG/DL (ref 0.57–1)
DEPRECATED RDW RBC AUTO: 50.3 FL (ref 37–54)
DEPRECATED RDW RBC AUTO: 52.5 FL (ref 37–54)
EGFRCR SERPLBLD CKD-EPI 2021: 15.3 ML/MIN/1.73
EGFRCR SERPLBLD CKD-EPI 2021: 18.7 ML/MIN/1.73
EOSINOPHIL # BLD AUTO: 0.08 10*3/MM3 (ref 0–0.4)
EOSINOPHIL # BLD AUTO: 0.11 10*3/MM3 (ref 0–0.4)
EOSINOPHIL NFR BLD AUTO: 1.3 % (ref 0.3–6.2)
EOSINOPHIL NFR BLD AUTO: 1.8 % (ref 0.3–6.2)
ERYTHROCYTE [DISTWIDTH] IN BLOOD BY AUTOMATED COUNT: 15.3 % (ref 12.3–15.4)
ERYTHROCYTE [DISTWIDTH] IN BLOOD BY AUTOMATED COUNT: 15.3 % (ref 12.3–15.4)
GLOBULIN UR ELPH-MCNC: 3.6 GM/DL
GLOBULIN UR ELPH-MCNC: 3.6 GM/DL
GLUCOSE BLDC GLUCOMTR-MCNC: 205 MG/DL (ref 70–99)
GLUCOSE BLDC GLUCOMTR-MCNC: 209 MG/DL (ref 70–99)
GLUCOSE BLDC GLUCOMTR-MCNC: 212 MG/DL (ref 70–99)
GLUCOSE BLDC GLUCOMTR-MCNC: 230 MG/DL (ref 70–99)
GLUCOSE SERPL-MCNC: 229 MG/DL (ref 65–99)
GLUCOSE SERPL-MCNC: 348 MG/DL (ref 65–99)
GLUCOSE UR STRIP-MCNC: ABNORMAL MG/DL
HCT VFR BLD AUTO: 38.5 % (ref 34–46.6)
HCT VFR BLD AUTO: 38.7 % (ref 34–46.6)
HGB BLD-MCNC: 12 G/DL (ref 12–15.9)
HGB BLD-MCNC: 12.5 G/DL (ref 12–15.9)
HGB UR QL STRIP.AUTO: ABNORMAL
HYALINE CASTS UR QL AUTO: ABNORMAL /LPF
IMM GRANULOCYTES # BLD AUTO: 0.03 10*3/MM3 (ref 0–0.05)
IMM GRANULOCYTES # BLD AUTO: 0.03 10*3/MM3 (ref 0–0.05)
IMM GRANULOCYTES NFR BLD AUTO: 0.5 % (ref 0–0.5)
IMM GRANULOCYTES NFR BLD AUTO: 0.5 % (ref 0–0.5)
KETONES UR QL STRIP: NEGATIVE
LEUKOCYTE ESTERASE UR QL STRIP.AUTO: ABNORMAL
LYMPHOCYTES # BLD AUTO: 1.82 10*3/MM3 (ref 0.7–3.1)
LYMPHOCYTES # BLD AUTO: 1.91 10*3/MM3 (ref 0.7–3.1)
LYMPHOCYTES NFR BLD AUTO: 29.9 % (ref 19.6–45.3)
LYMPHOCYTES NFR BLD AUTO: 30.8 % (ref 19.6–45.3)
MAGNESIUM SERPL-MCNC: 1.5 MG/DL (ref 1.6–2.4)
MCH RBC QN AUTO: 29.1 PG (ref 26.6–33)
MCH RBC QN AUTO: 29.1 PG (ref 26.6–33)
MCHC RBC AUTO-ENTMCNC: 31.2 G/DL (ref 31.5–35.7)
MCHC RBC AUTO-ENTMCNC: 32.3 G/DL (ref 31.5–35.7)
MCV RBC AUTO: 90 FL (ref 79–97)
MCV RBC AUTO: 93.4 FL (ref 79–97)
MONOCYTES # BLD AUTO: 0.53 10*3/MM3 (ref 0.1–0.9)
MONOCYTES # BLD AUTO: 0.56 10*3/MM3 (ref 0.1–0.9)
MONOCYTES NFR BLD AUTO: 8.5 % (ref 5–12)
MONOCYTES NFR BLD AUTO: 9.2 % (ref 5–12)
NEUTROPHILS NFR BLD AUTO: 3.5 10*3/MM3 (ref 1.7–7)
NEUTROPHILS NFR BLD AUTO: 3.62 10*3/MM3 (ref 1.7–7)
NEUTROPHILS NFR BLD AUTO: 57.6 % (ref 42.7–76)
NEUTROPHILS NFR BLD AUTO: 58.3 % (ref 42.7–76)
NITRITE UR QL STRIP: NEGATIVE
NRBC BLD AUTO-RTO: 0 /100 WBC (ref 0–0.2)
NRBC BLD AUTO-RTO: 0 /100 WBC (ref 0–0.2)
PH UR STRIP.AUTO: 5.5 [PH] (ref 5–8)
PHOSPHATE SERPL-MCNC: 3.3 MG/DL (ref 2.5–4.5)
PLATELET # BLD AUTO: 194 10*3/MM3 (ref 140–450)
PLATELET # BLD AUTO: 202 10*3/MM3 (ref 140–450)
PMV BLD AUTO: 11.9 FL (ref 6–12)
PMV BLD AUTO: 12 FL (ref 6–12)
POTASSIUM SERPL-SCNC: 2.9 MMOL/L (ref 3.5–5.2)
POTASSIUM SERPL-SCNC: 3.8 MMOL/L (ref 3.5–5.2)
PROT SERPL-MCNC: 6.6 G/DL (ref 6–8.5)
PROT SERPL-MCNC: 7.3 G/DL (ref 6–8.5)
PROT UR QL STRIP: NEGATIVE
QT INTERVAL: 527 MS
QTC INTERVAL: 558 MS
RBC # BLD AUTO: 4.12 10*6/MM3 (ref 3.77–5.28)
RBC # BLD AUTO: 4.3 10*6/MM3 (ref 3.77–5.28)
RBC # UR STRIP: ABNORMAL /HPF
REF LAB TEST METHOD: ABNORMAL
SODIUM SERPL-SCNC: 133 MMOL/L (ref 136–145)
SODIUM SERPL-SCNC: 137 MMOL/L (ref 136–145)
SP GR UR STRIP: 1.01 (ref 1–1.03)
SQUAMOUS #/AREA URNS HPF: ABNORMAL /HPF
UROBILINOGEN UR QL STRIP: ABNORMAL
WBC # UR STRIP: ABNORMAL /HPF
WBC NRBC COR # BLD AUTO: 6.08 10*3/MM3 (ref 3.4–10.8)
WBC NRBC COR # BLD AUTO: 6.21 10*3/MM3 (ref 3.4–10.8)

## 2025-03-08 PROCEDURE — 85025 COMPLETE CBC W/AUTO DIFF WBC: CPT | Performed by: STUDENT IN AN ORGANIZED HEALTH CARE EDUCATION/TRAINING PROGRAM

## 2025-03-08 PROCEDURE — 87086 URINE CULTURE/COLONY COUNT: CPT

## 2025-03-08 PROCEDURE — 76775 US EXAM ABDO BACK WALL LIM: CPT

## 2025-03-08 PROCEDURE — 83735 ASSAY OF MAGNESIUM: CPT | Performed by: STUDENT IN AN ORGANIZED HEALTH CARE EDUCATION/TRAINING PROGRAM

## 2025-03-08 PROCEDURE — 80053 COMPREHEN METABOLIC PANEL: CPT | Performed by: STUDENT IN AN ORGANIZED HEALTH CARE EDUCATION/TRAINING PROGRAM

## 2025-03-08 PROCEDURE — 25010000002 CEFTRIAXONE PER 250 MG

## 2025-03-08 PROCEDURE — 99223 1ST HOSP IP/OBS HIGH 75: CPT | Performed by: STUDENT IN AN ORGANIZED HEALTH CARE EDUCATION/TRAINING PROGRAM

## 2025-03-08 PROCEDURE — 84100 ASSAY OF PHOSPHORUS: CPT | Performed by: STUDENT IN AN ORGANIZED HEALTH CARE EDUCATION/TRAINING PROGRAM

## 2025-03-08 PROCEDURE — 87088 URINE BACTERIA CULTURE: CPT

## 2025-03-08 PROCEDURE — 25810000003 SODIUM CHLORIDE 0.9 % SOLUTION: Performed by: INTERNAL MEDICINE

## 2025-03-08 PROCEDURE — 81001 URINALYSIS AUTO W/SCOPE: CPT

## 2025-03-08 PROCEDURE — 71045 X-RAY EXAM CHEST 1 VIEW: CPT

## 2025-03-08 PROCEDURE — 87186 SC STD MICRODIL/AGAR DIL: CPT

## 2025-03-08 PROCEDURE — 82948 REAGENT STRIP/BLOOD GLUCOSE: CPT

## 2025-03-08 PROCEDURE — 93005 ELECTROCARDIOGRAM TRACING: CPT | Performed by: INTERNAL MEDICINE

## 2025-03-08 PROCEDURE — 25810000003 SODIUM CHLORIDE 0.9 % SOLUTION

## 2025-03-08 PROCEDURE — 25010000002 CEFTRIAXONE PER 250 MG: Performed by: STUDENT IN AN ORGANIZED HEALTH CARE EDUCATION/TRAINING PROGRAM

## 2025-03-08 PROCEDURE — 63710000001 INSULIN LISPRO (HUMAN) PER 5 UNITS: Performed by: STUDENT IN AN ORGANIZED HEALTH CARE EDUCATION/TRAINING PROGRAM

## 2025-03-08 PROCEDURE — 36415 COLL VENOUS BLD VENIPUNCTURE: CPT | Performed by: STUDENT IN AN ORGANIZED HEALTH CARE EDUCATION/TRAINING PROGRAM

## 2025-03-08 PROCEDURE — 25010000002 MAGNESIUM SULFATE 2 GM/50ML SOLUTION: Performed by: INTERNAL MEDICINE

## 2025-03-08 PROCEDURE — 82948 REAGENT STRIP/BLOOD GLUCOSE: CPT | Performed by: STUDENT IN AN ORGANIZED HEALTH CARE EDUCATION/TRAINING PROGRAM

## 2025-03-08 RX ORDER — ATORVASTATIN CALCIUM 40 MG/1
40 TABLET, FILM COATED ORAL NIGHTLY
Status: DISCONTINUED | OUTPATIENT
Start: 2025-03-08 | End: 2025-03-09 | Stop reason: HOSPADM

## 2025-03-08 RX ORDER — POLYETHYLENE GLYCOL 3350 17 G/17G
17 POWDER, FOR SOLUTION ORAL DAILY PRN
Status: DISCONTINUED | OUTPATIENT
Start: 2025-03-08 | End: 2025-03-09 | Stop reason: HOSPADM

## 2025-03-08 RX ORDER — BISACODYL 10 MG
10 SUPPOSITORY, RECTAL RECTAL DAILY PRN
Status: DISCONTINUED | OUTPATIENT
Start: 2025-03-08 | End: 2025-03-09 | Stop reason: HOSPADM

## 2025-03-08 RX ORDER — INSULIN LISPRO 100 [IU]/ML
2-7 INJECTION, SOLUTION INTRAVENOUS; SUBCUTANEOUS
Status: DISCONTINUED | OUTPATIENT
Start: 2025-03-08 | End: 2025-03-09 | Stop reason: HOSPADM

## 2025-03-08 RX ORDER — SODIUM CHLORIDE 9 MG/ML
40 INJECTION, SOLUTION INTRAVENOUS AS NEEDED
Status: DISCONTINUED | OUTPATIENT
Start: 2025-03-08 | End: 2025-03-09 | Stop reason: HOSPADM

## 2025-03-08 RX ORDER — SOLIFENACIN SUCCINATE 5 MG/1
5 TABLET, FILM COATED ORAL DAILY
COMMUNITY
Start: 2025-01-23

## 2025-03-08 RX ORDER — BISACODYL 5 MG/1
5 TABLET, DELAYED RELEASE ORAL DAILY PRN
Status: DISCONTINUED | OUTPATIENT
Start: 2025-03-08 | End: 2025-03-09 | Stop reason: HOSPADM

## 2025-03-08 RX ORDER — NICOTINE POLACRILEX 4 MG
15 LOZENGE BUCCAL
Status: DISCONTINUED | OUTPATIENT
Start: 2025-03-08 | End: 2025-03-09 | Stop reason: HOSPADM

## 2025-03-08 RX ORDER — SODIUM CHLORIDE 0.9 % (FLUSH) 0.9 %
10 SYRINGE (ML) INJECTION EVERY 12 HOURS SCHEDULED
Status: DISCONTINUED | OUTPATIENT
Start: 2025-03-08 | End: 2025-03-09 | Stop reason: HOSPADM

## 2025-03-08 RX ORDER — POTASSIUM CHLORIDE 750 MG/1
20 CAPSULE, EXTENDED RELEASE ORAL ONCE
Status: COMPLETED | OUTPATIENT
Start: 2025-03-08 | End: 2025-03-08

## 2025-03-08 RX ORDER — SODIUM CHLORIDE 9 MG/ML
75 INJECTION, SOLUTION INTRAVENOUS CONTINUOUS
Status: DISCONTINUED | OUTPATIENT
Start: 2025-03-08 | End: 2025-03-09

## 2025-03-08 RX ORDER — AMOXICILLIN 250 MG
2 CAPSULE ORAL 2 TIMES DAILY PRN
Status: DISCONTINUED | OUTPATIENT
Start: 2025-03-08 | End: 2025-03-09 | Stop reason: HOSPADM

## 2025-03-08 RX ORDER — IBUPROFEN 600 MG/1
1 TABLET ORAL
Status: DISCONTINUED | OUTPATIENT
Start: 2025-03-08 | End: 2025-03-09 | Stop reason: HOSPADM

## 2025-03-08 RX ORDER — CLOPIDOGREL BISULFATE 75 MG/1
1 TABLET ORAL DAILY
COMMUNITY
Start: 2025-01-20

## 2025-03-08 RX ORDER — POTASSIUM CHLORIDE 750 MG/1
20 CAPSULE, EXTENDED RELEASE ORAL EVERY 4 HOURS
Status: COMPLETED | OUTPATIENT
Start: 2025-03-08 | End: 2025-03-08

## 2025-03-08 RX ORDER — DEXTROSE MONOHYDRATE 25 G/50ML
25 INJECTION, SOLUTION INTRAVENOUS
Status: DISCONTINUED | OUTPATIENT
Start: 2025-03-08 | End: 2025-03-09 | Stop reason: HOSPADM

## 2025-03-08 RX ORDER — SODIUM CHLORIDE 0.9 % (FLUSH) 0.9 %
10 SYRINGE (ML) INJECTION AS NEEDED
Status: DISCONTINUED | OUTPATIENT
Start: 2025-03-08 | End: 2025-03-09 | Stop reason: HOSPADM

## 2025-03-08 RX ORDER — PANTOPRAZOLE SODIUM 40 MG/1
40 TABLET, DELAYED RELEASE ORAL
Status: DISCONTINUED | OUTPATIENT
Start: 2025-03-08 | End: 2025-03-09 | Stop reason: HOSPADM

## 2025-03-08 RX ORDER — MAGNESIUM SULFATE HEPTAHYDRATE 40 MG/ML
2 INJECTION, SOLUTION INTRAVENOUS ONCE
Status: COMPLETED | OUTPATIENT
Start: 2025-03-08 | End: 2025-03-09

## 2025-03-08 RX ADMIN — POTASSIUM CHLORIDE 20 MEQ: 750 CAPSULE, EXTENDED RELEASE ORAL at 15:07

## 2025-03-08 RX ADMIN — INSULIN LISPRO 3 UNITS: 100 INJECTION, SOLUTION INTRAVENOUS; SUBCUTANEOUS at 12:19

## 2025-03-08 RX ADMIN — SODIUM CHLORIDE 75 ML/HR: 9 INJECTION, SOLUTION INTRAVENOUS at 15:04

## 2025-03-08 RX ADMIN — CEFTRIAXONE SODIUM 1000 MG: 1 INJECTION, POWDER, FOR SOLUTION INTRAMUSCULAR; INTRAVENOUS at 23:11

## 2025-03-08 RX ADMIN — SODIUM CHLORIDE 2000 MG: 9 INJECTION INTRAMUSCULAR; INTRAVENOUS; SUBCUTANEOUS at 00:55

## 2025-03-08 RX ADMIN — PANTOPRAZOLE SODIUM 40 MG: 40 TABLET, DELAYED RELEASE ORAL at 10:52

## 2025-03-08 RX ADMIN — INSULIN LISPRO 3 UNITS: 100 INJECTION, SOLUTION INTRAVENOUS; SUBCUTANEOUS at 17:45

## 2025-03-08 RX ADMIN — POTASSIUM CHLORIDE 20 MEQ: 750 CAPSULE, EXTENDED RELEASE ORAL at 10:52

## 2025-03-08 RX ADMIN — Medication 10 ML: at 08:06

## 2025-03-08 RX ADMIN — POTASSIUM CHLORIDE 20 MEQ: 750 CAPSULE, EXTENDED RELEASE ORAL at 23:11

## 2025-03-08 RX ADMIN — Medication 10 ML: at 20:32

## 2025-03-08 RX ADMIN — APIXABAN 2.5 MG: 2.5 TABLET, FILM COATED ORAL at 20:27

## 2025-03-08 RX ADMIN — ATORVASTATIN CALCIUM 40 MG: 40 TABLET, FILM COATED ORAL at 20:28

## 2025-03-08 RX ADMIN — SODIUM CHLORIDE 1000 ML: 9 INJECTION, SOLUTION INTRAVENOUS at 00:43

## 2025-03-08 RX ADMIN — INSULIN LISPRO 3 UNITS: 100 INJECTION, SOLUTION INTRAVENOUS; SUBCUTANEOUS at 08:05

## 2025-03-08 RX ADMIN — INSULIN LISPRO 3 UNITS: 100 INJECTION, SOLUTION INTRAVENOUS; SUBCUTANEOUS at 20:28

## 2025-03-08 RX ADMIN — MAGNESIUM SULFATE HEPTAHYDRATE 2 G: 40 INJECTION, SOLUTION INTRAVENOUS at 10:52

## 2025-03-08 RX ADMIN — APIXABAN 2.5 MG: 2.5 TABLET, FILM COATED ORAL at 08:05

## 2025-03-08 RX ADMIN — POTASSIUM CHLORIDE 20 MEQ: 750 CAPSULE, EXTENDED RELEASE ORAL at 20:27

## 2025-03-08 RX ADMIN — Medication 10 ML: at 08:05

## 2025-03-08 NOTE — PLAN OF CARE
Goal Outcome Evaluation:  Plan of Care Reviewed With: patient        Progress: no change  Outcome Evaluation: Patient presented to floor from ED with generalized weakness, patient was bladder scanned after voiding with 324 mls post residual. Patient is resting comfortably, continue plan of care.

## 2025-03-08 NOTE — PLAN OF CARE
Goal Outcome Evaluation:  Plan of Care Reviewed With: patient        Progress: no change  Outcome Evaluation: VSS, bladder scanned this shift with PVD of 296ml. No c/o pain this shift, orthostatic BP complete per order. No acute concerns this shift. Continue plan of care.

## 2025-03-08 NOTE — CONSULTS
Nephrology consult note    Gallito Ruff MD     Reason for consult: Renal insufficiency    HPI: This is an 79-year-old female with multiple medical issues including atherosclerotic coronary artery disease status post stent placement, history of previous CVA in 11 of 23 history of hyperlipidemia history of atrial fibrillation sick sinus syndrome status post pacemaker history of hypertension history of diabetes mellitus history of lung CA status post chemo and XRT in the past history of hypercalcemia secondary to HCTZ in the past history of hyperkalemia and not able to tolerate  ACE or an ARB due to this, and a history of chronic kidney disease stage IV with baseline creatinine around 2± is followed in our group who apparently over the last 24 hours became weaker and had blood work done that showed increased creatinine patient presented to the hospital patient denies any nausea vomiting chest pain she has chronic shortness of breath no worse.  Upon presentation creatinine was 2.99 with a glucose of 348 potassium of 3.8.  Creatinine did improved today 2.53 potassium is down to 2.9 magnesium is 1.5.  Chest x-ray showed questionable small pleural effusion.  Patient also had some urinary symptoms and urinalysis here show positive glucose positive WBCs as well as positive bacteria.  Patient currently is alert.      Past Medical History:   Diagnosis Date    CAD (coronary artery disease)     Essential (primary) hypertension     Ex-cigarette smoker     Quit 1992    Lung cancer 2016    S/P Radiation and chemotherapy    Paroxysmal atrial fibrillation     Sick sinus syndrome 04/2024    Stroke 11/04/2023    Type 2 diabetes mellitus      Social History     Socioeconomic History    Marital status:    Tobacco Use    Smoking status: Former     Types: Cigarettes    Smokeless tobacco: Never   Vaping Use    Vaping status: Never Used   Substance and Sexual Activity    Alcohol use: Not Currently    Drug use: Never    Sexual  activity: Defer     No current facility-administered medications on file prior to encounter.     Current Outpatient Medications on File Prior to Encounter   Medication Sig Dispense Refill    alendronate (FOSAMAX) 70 MG tablet Take 1 tablet by mouth Every 7 (Seven) Days. SUN      amiodarone (PACERONE) 200 MG tablet Take 1.5 tablets by mouth Daily.      apixaban (ELIQUIS) 5 MG tablet tablet Take 1 tablet by mouth Every 12 (Twelve) Hours. Indications: Atrial Fibrillation 180 tablet 3    atorvastatin (LIPITOR) 40 MG tablet Take 1 tablet by mouth Every Night.      bisoprolol (ZEBeta) 5 MG tablet Take 1 tablet by mouth Daily. 90 tablet 3    bumetanide (BUMEX) 2 MG tablet Take 1 tablet by mouth 2 (Two) Times a Day.      glipizide (GLUCOTROL) 10 MG tablet Take 2 tablets by mouth 2 (Two) Times a Day Before Meals.      midodrine (PROAMATINE) 5 MG tablet Take 1 tablet by mouth 3 (Three) Times a Day Before Meals.      pantoprazole (PROTONIX) 20 MG EC tablet Take 1 tablet by mouth Every Morning. (Replaces omeprazole) 30 tablet 0    pioglitazone (ACTOS) 30 MG tablet Take 1 tablet by mouth Daily.      potassium chloride 10 MEQ CR tablet Take 1 tablet by mouth Daily. 90 tablet 2      apixaban, 2.5 mg, Oral, Q12H  atorvastatin, 40 mg, Oral, Nightly  [START ON 3/9/2025] cefTRIAXone, 1,000 mg, Intravenous, Q24H  insulin lispro, 2-7 Units, Subcutaneous, 4x Daily AC & at Bedtime  pantoprazole, 40 mg, Oral, Q AM  potassium chloride, 20 mEq, Oral, Q4H  sodium chloride, 10 mL, Intravenous, Q12H         senna-docusate sodium **AND** polyethylene glycol **AND** bisacodyl **AND** bisacodyl    dextrose    dextrose    glucagon (human recombinant)    sodium chloride    sodium chloride   Family History   Problem Relation Age of Onset    Heart disease Mother     Heart disease Father     Heart disease Brother        Review of Systems: As mentioned above otherwise negative    Physical Exam:  /69 (BP Location: Left arm, Patient Position:  "Standing)   Pulse 88   Temp 98.1 °F (36.7 °C) (Oral)   Resp 22   Ht 157.5 cm (62\")   Wt 57.5 kg (126 lb 12.2 oz) Comment: INFORMED RN, PT. STATES SHE HAS BEEN LOSING WEIGHT.  SpO2 96%   BMI 23.19 kg/m²     Intake/Output Summary (Last 24 hours) at 3/8/2025 1430  Last data filed at 3/8/2025 1302  Gross per 24 hour   Intake 340 ml   Output 850 ml   Net -510 ml      General: Patient alert and oriented no acute distress  HEENT: Normocephalic atraumatic pupils are equal round reactive to light extraocular muscles are intact there appears to be normal mouth is clear no erythema no exudates neck supple no adenopathy  Cardiac:  patient without a rub no S3 or S4  Lungs: Clear bilaterally no wheezes rhonchi or rales  Abdomen: Bowel sounds positive nontender soft no mass felt no apparent organomegaly noted  Extremities: No edema  or cyanosis  Skin: No acute rashes noted  : Deferred  Neuro: Appears intact with motor and sensory grossly    Lab Results (last 24 hours)       Procedure Component Value Units Date/Time    POC Glucose 4x Daily Before Meals & at Bedtime [[id number]]  (Abnormal) Collected: 03/08/25 1137    Specimen: Blood Updated: 03/08/25 1140     Glucose 205 mg/dL      Comment: Serial Number: [id number]:  [id number]       POC Glucose Once [[id number]]  (Abnormal) Collected: 03/08/25 0737    Specimen: Blood Updated: 03/08/25 0740     Glucose 209 mg/dL      Comment: Serial Number: [id number]:  [id number]       Phosphorus [[id number]]  (Normal) Collected: 03/08/25 0606    Specimen: Blood from Hand, Left Updated: 03/08/25 0656     Phosphorus 3.3 mg/dL     Magnesium [[id number]]  (Abnormal) Collected: 03/08/25 0606    Specimen: Blood from Hand, Left Updated: 03/08/25 0656     Magnesium 1.5 mg/dL     Comprehensive Metabolic Panel [[id number]]  (Abnormal) Collected: 03/08/25 0606    Specimen: Blood from Hand, Left Updated: 03/08/25 0656     Glucose 229 mg/dL      BUN 45 mg/dL      Creatinine 2.53 mg/dL      " Sodium 133 mmol/L      Potassium 2.9 mmol/L      Comment: Slight hemolysis detected by analyzer. Result may be falsely elevated.        Chloride 94 mmol/L      CO2 20.8 mmol/L      Calcium 8.6 mg/dL      Total Protein 6.6 g/dL      Albumin 3.0 g/dL      ALT (SGPT) 12 U/L      AST (SGOT) 26 U/L      Alkaline Phosphatase 66 U/L      Total Bilirubin 0.5 mg/dL      Globulin 3.6 gm/dL      A/G Ratio 0.8 g/dL      BUN/Creatinine Ratio 17.8     Anion Gap 18.2 mmol/L      eGFR 18.7 mL/min/1.73     Narrative:      GFR Categories in Chronic Kidney Disease (CKD)      GFR Category          GFR (mL/min/1.73)    Interpretation  G1                     90 or greater         Normal or high (1)  G2                      60-89                Mild decrease (1)  G3a                   45-59                Mild to moderate decrease  G3b                   30-44                Moderate to severe decrease  G4                    15-29                Severe decrease  G5                    14 or less           Kidney failure          (1)In the absence of evidence of kidney disease, neither GFR category G1 or G2 fulfill the criteria for CKD.    eGFR calculation 2021 CKD-EPI creatinine equation, which does not include race as a factor    CBC & Differential [[id number]]  (Abnormal) Collected: 03/08/25 0606    Specimen: Blood from Hand, Left Updated: 03/08/25 0621    Narrative:      The following orders were created for panel order CBC & Differential.  Procedure                               Abnormality         Status                     ---------                               -----------         ------                     CBC Auto Differential[[id number]]        Abnormal            Final result                 Please view results for these tests on the individual orders.    CBC Auto Differential [[id number]]  (Abnormal) Collected: 03/08/25 0606    Specimen: Blood from Hand, Left Updated: 03/08/25 0621     WBC 6.08 10*3/mm3      RBC 4.12 10*6/mm3       Hemoglobin 12.0 g/dL      Hematocrit 38.5 %      MCV 93.4 fL      MCH 29.1 pg      MCHC 31.2 g/dL      RDW 15.3 %      RDW-SD 52.5 fl      MPV 12.0 fL      Platelets 194 10*3/mm3      Neutrophil % 57.6 %      Lymphocyte % 29.9 %      Monocyte % 9.2 %      Eosinophil % 1.8 %      Basophil % 1.0 %      Immature Grans % 0.5 %      Neutrophils, Absolute 3.50 10*3/mm3      Lymphocytes, Absolute 1.82 10*3/mm3      Monocytes, Absolute 0.56 10*3/mm3      Eosinophils, Absolute 0.11 10*3/mm3      Basophils, Absolute 0.06 10*3/mm3      Immature Grans, Absolute 0.03 10*3/mm3      nRBC 0.0 /100 WBC     Urinalysis, Microscopic Only - Urine, Clean Catch [[id number]]  (Abnormal) Collected: 03/08/25 0047    Specimen: Urine, Clean Catch Updated: 03/08/25 0220     RBC, UA None Seen /HPF      WBC, UA Too Numerous to Count /HPF      Bacteria, UA 3+ /HPF      Squamous Epithelial Cells, UA 7-12 /HPF      Hyaline Casts, UA None Seen /LPF      Methodology Manual Light Microscopy    Urine Culture - Urine, Urine, Clean Catch [[id number]] Collected: 03/08/25 0047    Specimen: Urine, Clean Catch Updated: 03/08/25 0220    Urinalysis With Culture If Indicated - Urine, Clean Catch [[id number]]  (Abnormal) Collected: 03/08/25 0047    Specimen: Urine, Clean Catch Updated: 03/08/25 0219     Color, UA Yellow     Appearance, UA Cloudy     pH, UA 5.5     Specific Gravity, UA 1.013     Glucose, UA >=1000 mg/dL (3+)     Ketones, UA Negative     Bilirubin, UA Negative     Blood, UA Trace     Protein, UA Negative     Leuk Esterase, UA Moderate (2+)     Nitrite, UA Negative     Urobilinogen, UA 0.2 E.U./dL    Narrative:      In absence of clinical symptoms, the presence of pyuria, bacteria, and/or nitrites on the urinalysis result does not correlate with infection.    CK [[id number]]  (Normal) Collected: 03/07/25 9883    Specimen: Blood from Arm, Left Updated: 03/08/25 0144     Creatine Kinase 61 U/L     Comprehensive Metabolic Panel [[id number]]  (Abnormal)  Collected: 03/07/25 2357    Specimen: Blood from Arm, Left Updated: 03/08/25 0018     Glucose 348 mg/dL      BUN 50 mg/dL      Creatinine 2.99 mg/dL      Sodium 137 mmol/L      Potassium 3.8 mmol/L      Comment: Slight hemolysis detected by analyzer. Result may be falsely elevated.        Chloride 92 mmol/L      CO2 27.5 mmol/L      Calcium 9.9 mg/dL      Total Protein 7.3 g/dL      Albumin 3.7 g/dL      ALT (SGPT) 15 U/L      AST (SGOT) 28 U/L      Alkaline Phosphatase 76 U/L      Total Bilirubin 0.8 mg/dL      Globulin 3.6 gm/dL      A/G Ratio 1.0 g/dL      BUN/Creatinine Ratio 16.7     Anion Gap 17.5 mmol/L      eGFR 15.3 mL/min/1.73     Narrative:      GFR Categories in Chronic Kidney Disease (CKD)      GFR Category          GFR (mL/min/1.73)    Interpretation  G1                     90 or greater         Normal or high (1)  G2                      60-89                Mild decrease (1)  G3a                   45-59                Mild to moderate decrease  G3b                   30-44                Moderate to severe decrease  G4                    15-29                Severe decrease  G5                    14 or less           Kidney failure          (1)In the absence of evidence of kidney disease, neither GFR category G1 or G2 fulfill the criteria for CKD.    eGFR calculation 2021 CKD-EPI creatinine equation, which does not include race as a factor    CBC & Differential [[id number]]  (Normal) Collected: 03/07/25 2357    Specimen: Blood Updated: 03/08/25 0000    Narrative:      The following orders were created for panel order CBC & Differential.  Procedure                               Abnormality         Status                     ---------                               -----------         ------                     CBC Auto Differential[[id number]]        Normal              Final result                 Please view results for these tests on the individual orders.    CBC Auto Differential [[id number]]  (Normal)  Collected: 03/07/25 2357    Specimen: Blood Updated: 03/08/25 0000     WBC 6.21 10*3/mm3      RBC 4.30 10*6/mm3      Hemoglobin 12.5 g/dL      Hematocrit 38.7 %      MCV 90.0 fL      MCH 29.1 pg      MCHC 32.3 g/dL      RDW 15.3 %      RDW-SD 50.3 fl      MPV 11.9 fL      Platelets 202 10*3/mm3      Neutrophil % 58.3 %      Lymphocyte % 30.8 %      Monocyte % 8.5 %      Eosinophil % 1.3 %      Basophil % 0.6 %      Immature Grans % 0.5 %      Neutrophils, Absolute 3.62 10*3/mm3      Lymphocytes, Absolute 1.91 10*3/mm3      Monocytes, Absolute 0.53 10*3/mm3      Eosinophils, Absolute 0.08 10*3/mm3      Basophils, Absolute 0.04 10*3/mm3      Immature Grans, Absolute 0.03 10*3/mm3      nRBC 0.0 /100 WBC     Extra Tubes [[id number]] Collected: 03/07/25 2158    Specimen: Blood from Arm, Left Updated: 03/07/25 2215    Narrative:      The following orders were created for panel order Extra Tubes.  Procedure                               Abnormality         Status                     ---------                               -----------         ------                     Green Top (Gel)[[id number]]                                  Final result                 Please view results for these tests on the individual orders.    Green Top (Gel) [[id number]] Collected: 03/07/25 2158    Specimen: Blood from Arm, Left Updated: 03/07/25 2215     Extra Tube Hold for add-ons.     Comment: Auto resulted.                Imaging Results (Last 24 Hours)       Procedure Component Value Units Date/Time    XR Chest 1 View [[id number]] Collected: 03/08/25 0353     Updated: 03/08/25 0358    Narrative:      XR CHEST 1 VW-     Date of exam: 3/8/2025 3:08 AM     Indications: Evaluate for pleural effusion; n17.9-acute kidney failure,  unspecified.     Comparisons: 1/18/2025 (CT); 1/17/2025 (CXR).     FINDINGS:  A single AP (or PA) upright portable chest radiograph was performed.  Borderline cardiac enlargement is seen. There may be atelectasis  and/or  infiltrate(s) in the left lung base. A small left pleural effusion is  suggested. No right pleural effusion. No acute infiltrate is appreciated  elsewhere. No pneumothorax is identified.          Impression:      There may be a small left pleural effusion. Mild atelectasis and/or  infiltrate(s) in the left lung base is (are) possible. Overall, there is  thought to be improvement in aeration of the left lung base since  1/17/2025. Please see above comments for further detail.            Portions of this note were completed with a voice recognition program.     3/8/2025 3:56 AM by Samy Vieyra MD on Workstation: [id number]                Assessment/plan:  1.  Acute on chronic kidney disease stage IV with baseline creatinine around 2±.  Creatinine is a little better today I wonder if patient had some volume depletion secondary to severe hyperglycemia and osmotic diuresis on top of her Bumex although it was recently decreased to 1  a day she states about a month ago due to hypotension.  Patient did receive fluid bolus upon presentation we will restart some IV fluids check labs in AM.    2.  Hypokalemia: Significant decrease potassium patient received oral dose earlier today will give several more oral doses, patient also with decreased magnesium and has already been replaced.  Will recheck in a.m.    3.  Hyperglycemia/diabetes mellitus poor control patient was ordered and given some SGLT2 in the past although she states she is not on it now she cannot afford it    4.  History of atherosclerotic coronary artery disease/history of atrial fibs/pacemaker / PTCA    5.  Probable UTI: On Rocephin currently await culture results    Thank you very much        Gallito Ruff MD

## 2025-03-08 NOTE — H&P
[facility]  Date: 3/8/2025   Patient Name: Michelle Atkinson  : 1945  MRN: [id number]  Primary Care Physician:  Samy Leon Jr., MD  Date of admission: 3/7/2025    Subjective   Subjective     Chief Complaint: Elevated creatinine    HPI:    Michelle Atkinson is a 79 y.o. female  with a past medical history of CAD status post stent x 1 in 2025, A-fib on Eliquis, hypertension, hyperlipidemia, sick sinus syndrome status post pacemaker, CKD stage IIIb-4 presented to the ED after outpatient labs showed elevated creatinine levels.  Patient is on Bumex and in the last 24 hours patient has noted decreased urine output than usual.  Also noted to have increased urinary frequency.  Patient supposed to see Dr. Johnston and hand some routine labs today and noted to have increased creatinine levels.  Positive for generalized weakness.  Denies any fevers, chills, chest pain, shortness of breath.  Patient recently had a large left pleural effusion and underwent thoracocentesis on 2024 at outside hospital, also noted to have a minimal pericardial effusion on cardiac echo.    Upon arrival, vital signs temperature 97.3, pulse 81, respiratory 16, blood pressure 139/76 on room air saturating at 100%.  Labs showed BUN 50, creatinine 2.99, creatinine a month ago was 1.35, rest of the CMP, CBC within normal limits.  Receiving 1 L IV fluids in the ED.  Patient has been admitted for further evaluation and management of HAYLEE on CKD    Personal History     Past Medical History:   Diagnosis Date    CAD (coronary artery disease)     Essential (primary) hypertension     Ex-cigarette smoker     Quit     Lung cancer     S/P Radiation and chemotherapy    Paroxysmal atrial fibrillation     Sick sinus syndrome 2024    Stroke 2023    Type 2 diabetes mellitus          Past Surgical History:   Procedure Laterality Date    CARDIAC CATHETERIZATION N/A 2024    Procedure:  Right and Left Heart Cath;  Surgeon: Kath Smith MD;  Location: [facility] CATH INVASIVE LOCATION;  Service: Cardiovascular;  Laterality: N/A;    CARDIAC CATHETERIZATION N/A 01/31/2024    Procedure: Right Heart Cath;  Surgeon: Kath Smith MD;  Location: [facility] CATH INVASIVE LOCATION;  Service: Cardiovascular;  Laterality: N/A;    CARDIAC CATHETERIZATION N/A 01/31/2024    Procedure: Left ventriculography;  Surgeon: Kath Smith MD;  Location: [facility] CATH INVASIVE LOCATION;  Service: Cardiovascular;  Laterality: N/A;    CARDIAC CATHETERIZATION N/A 1/19/2025    Procedure: Left Heart Cath;  Surgeon: James Damon MD;  Location: [facility] CATH INVASIVE LOCATION;  Service: Cardiology;  Laterality: N/A;    CARDIAC ELECTROPHYSIOLOGY PROCEDURE N/A 10/18/2023    Procedure: Loop insertion- Coldiron;  Surgeon: Kath Smith MD;  Location: [facility] CATH INVASIVE LOCATION;  Service: Cardiovascular;  Laterality: N/A;    CARDIAC ELECTROPHYSIOLOGY PROCEDURE N/A 4/24/2024    Procedure: Pacemaker DC new biotronik;  Surgeon: Kath Smith MD;  Location: [facility] CATH INVASIVE LOCATION;  Service: Cardiovascular;  Laterality: N/A;  BIOTRONIK    CARDIAC ELECTROPHYSIOLOGY PROCEDURE N/A 4/24/2024    Procedure: Loop recorder removal;  Surgeon: Kath Smith MD;  Location: [facility] CATH INVASIVE LOCATION;  Service: Cardiovascular;  Laterality: N/A;    CATARACT EXTRACTION Bilateral     CORONARY STENT PLACEMENT      2002 and 2007 stent placement in [place], [place]    VAGINAL HYSTERECTOMY      1970's         Family History   Problem Relation Age of Onset    Heart disease Mother     Heart disease Father     Heart disease Brother          Social History     Socioeconomic History    Marital status:    Tobacco Use    Smoking status: Former     Types: Cigarettes    Smokeless tobacco: Never   Vaping Use    Vaping status: Never Used   Substance and Sexual Activity    Alcohol use: Not  Currently    Drug use: Never    Sexual activity: Defer         Home Medications:  alendronate, amiodarone, apixaban, atorvastatin, bisoprolol, bumetanide, glipizide, midodrine, pantoprazole, pioglitazone, and potassium chloride    Allergies:  Allergies   Allergen Reactions    Codeine Nausea And Vomiting         Objective   Objective     Vitals:   Temp:  [97.3 °F (36.3 °C)] 97.3 °F (36.3 °C)  Heart Rate:  [61-98] 98  Resp:  [16] 16  BP: (133-146)/(65-76) 136/76    Physical Exam    Constitutional: Awake, alert, no acute distress   Eyes: Pupils equal, sclerae anicteric, no conjunctival injection   HENT: NCAT, mucous membranes moist   Respiratory: Bilateral air entry present, nonlabored respirations    Cardiovascular: RRR, no murmurs   Gastrointestinal: Positive bowel sounds, soft, nontender, nondistended   Musculoskeletal: No bilateral ankle edema, no clubbing or cyanosis to extremities   Neurologic: Oriented x 3, speech clear    Result Review    Result Review:  I have personally reviewed the results from the time of this admission to 3/8/2025 02:56 EST and agree with these findings:  [x]  Laboratory  []  Microbiology  []  Radiology  []  EKG/Telemetry   []  Cardiology/Vascular   []  Pathology  []  Old records  []  Other:        Imaging Results (Last 24 Hours)       ** No results found for the last 24 hours. **             apixaban, 2.5 mg, Oral, Q12H  [START ON 3/9/2025] cefTRIAXone, 1,000 mg, Intravenous, Q24H  insulin lispro, 2-7 Units, Subcutaneous, 4x Daily AC & at Bedtime  sodium chloride, 10 mL, Intravenous, Q12H         Assessment & Plan   Assessment / Plan     Assessment/Plan:   HAYLEE on CKD likely due to dehydration  UTI  CAD s/p stent  A-fib on Eliquis  Hypertension  Hyperlipidemia  Sick sinus syndrome status post pacemaker  CKD stage IIIb/IV  HFrEF, EF 35 to 40% on 1/23/2025  Non-small cell carcinoma of the left lung  Recent large left pleural effusion status post thoracocentesis on 1/25/2025    Plan  Admit to  observation, remote telemetry  Received 1 L IV fluids in the ED  Monitor urine output  Bladder scan every 4 hours  Monitor creatinine with a.m. labs  Consult nephrology in the a.m., patient follows Dr. Johnston  Follow-up on chest x-ray to evaluate for pleural effusion  Continue ceftriaxone  Follow-up on urine cultures  Continue Eliquis 2.5 mg twice daily  Continue Plavix  Hold Bumex  Heart healthy, consistent carb diet  Low-dose sliding scale insulin  Resume other appropriate home medications once reconciled        VTE Prophylaxis:  Pharmacologic & mechanical VTE prophylaxis orders are present.    CODE STATUS:    Code Status (Patient has no pulse and is not breathing): CPR (Attempt to Resuscitate)  Medical Interventions (Patient has pulse or is breathing): Full Support  Level Of Support Discussed With: Patient      Admission Status:  I believe this patient meets inpatient status.    Part of this note may be an electronic transcription/translation of spoken language to printed text using the Dragon Dictation System    Ilan Hernandez MD

## 2025-03-09 ENCOUNTER — READMISSION MANAGEMENT (OUTPATIENT)
Dept: CALL CENTER | Facility: HOSPITAL | Age: 80
End: 2025-03-09
Payer: MEDICARE

## 2025-03-09 VITALS
DIASTOLIC BLOOD PRESSURE: 70 MMHG | RESPIRATION RATE: 18 BRPM | SYSTOLIC BLOOD PRESSURE: 125 MMHG | WEIGHT: 126.76 LBS | OXYGEN SATURATION: 97 % | HEIGHT: 62 IN | TEMPERATURE: 97.7 F | HEART RATE: 69 BPM | BODY MASS INDEX: 23.33 KG/M2

## 2025-03-09 LAB
ALBUMIN SERPL-MCNC: 2.8 G/DL (ref 3.5–5.2)
ALBUMIN/GLOB SERPL: 0.9 G/DL
ALP SERPL-CCNC: 57 U/L (ref 39–117)
ALT SERPL W P-5'-P-CCNC: 11 U/L (ref 1–33)
ANION GAP SERPL CALCULATED.3IONS-SCNC: 12.3 MMOL/L (ref 5–15)
AST SERPL-CCNC: 25 U/L (ref 1–32)
BILIRUB SERPL-MCNC: 0.4 MG/DL (ref 0–1.2)
BUN SERPL-MCNC: 30 MG/DL (ref 8–23)
BUN/CREAT SERPL: 15.5 (ref 7–25)
CALCIUM SPEC-SCNC: 8.4 MG/DL (ref 8.6–10.5)
CHLORIDE SERPL-SCNC: 104 MMOL/L (ref 98–107)
CK SERPL-CCNC: 63 U/L (ref 20–180)
CO2 SERPL-SCNC: 21.7 MMOL/L (ref 22–29)
CREAT SERPL-MCNC: 1.94 MG/DL (ref 0.57–1)
DEPRECATED RDW RBC AUTO: 52.7 FL (ref 37–54)
EGFRCR SERPLBLD CKD-EPI 2021: 25.8 ML/MIN/1.73
ERYTHROCYTE [DISTWIDTH] IN BLOOD BY AUTOMATED COUNT: 15.6 % (ref 12.3–15.4)
GLOBULIN UR ELPH-MCNC: 3 GM/DL
GLUCOSE BLDC GLUCOMTR-MCNC: 104 MG/DL (ref 70–99)
GLUCOSE BLDC GLUCOMTR-MCNC: 246 MG/DL (ref 70–99)
GLUCOSE SERPL-MCNC: 121 MG/DL (ref 65–99)
HCT VFR BLD AUTO: 34.6 % (ref 34–46.6)
HGB BLD-MCNC: 11.2 G/DL (ref 12–15.9)
MAGNESIUM SERPL-MCNC: 2 MG/DL (ref 1.6–2.4)
MCH RBC QN AUTO: 29.6 PG (ref 26.6–33)
MCHC RBC AUTO-ENTMCNC: 32.4 G/DL (ref 31.5–35.7)
MCV RBC AUTO: 91.5 FL (ref 79–97)
PHOSPHATE SERPL-MCNC: 2.5 MG/DL (ref 2.5–4.5)
PLATELET # BLD AUTO: 176 10*3/MM3 (ref 140–450)
PMV BLD AUTO: 12.2 FL (ref 6–12)
POTASSIUM SERPL-SCNC: 4.1 MMOL/L (ref 3.5–5.2)
PROT SERPL-MCNC: 5.8 G/DL (ref 6–8.5)
QT INTERVAL: 452 MS
QTC INTERVAL: 493 MS
RBC # BLD AUTO: 3.78 10*6/MM3 (ref 3.77–5.28)
SODIUM SERPL-SCNC: 138 MMOL/L (ref 136–145)
WBC NRBC COR # BLD AUTO: 4.93 10*3/MM3 (ref 3.4–10.8)

## 2025-03-09 PROCEDURE — 93005 ELECTROCARDIOGRAM TRACING: CPT | Performed by: INTERNAL MEDICINE

## 2025-03-09 PROCEDURE — 82948 REAGENT STRIP/BLOOD GLUCOSE: CPT

## 2025-03-09 PROCEDURE — 83735 ASSAY OF MAGNESIUM: CPT | Performed by: INTERNAL MEDICINE

## 2025-03-09 PROCEDURE — 82550 ASSAY OF CK (CPK): CPT | Performed by: INTERNAL MEDICINE

## 2025-03-09 PROCEDURE — 80053 COMPREHEN METABOLIC PANEL: CPT | Performed by: INTERNAL MEDICINE

## 2025-03-09 PROCEDURE — 25810000003 SODIUM CHLORIDE 0.9 % SOLUTION: Performed by: INTERNAL MEDICINE

## 2025-03-09 PROCEDURE — 84100 ASSAY OF PHOSPHORUS: CPT | Performed by: INTERNAL MEDICINE

## 2025-03-09 PROCEDURE — 63710000001 INSULIN LISPRO (HUMAN) PER 5 UNITS: Performed by: STUDENT IN AN ORGANIZED HEALTH CARE EDUCATION/TRAINING PROGRAM

## 2025-03-09 PROCEDURE — 99239 HOSP IP/OBS DSCHRG MGMT >30: CPT | Performed by: INTERNAL MEDICINE

## 2025-03-09 PROCEDURE — 85027 COMPLETE CBC AUTOMATED: CPT | Performed by: INTERNAL MEDICINE

## 2025-03-09 RX ORDER — GLIPIZIDE 10 MG/1
10 TABLET ORAL
Start: 2025-03-09

## 2025-03-09 RX ORDER — CLOPIDOGREL BISULFATE 75 MG/1
75 TABLET ORAL DAILY
Status: DISCONTINUED | OUTPATIENT
Start: 2025-03-09 | End: 2025-03-09 | Stop reason: HOSPADM

## 2025-03-09 RX ORDER — CEFDINIR 300 MG/1
300 CAPSULE ORAL DAILY
Qty: 5 CAPSULE | Refills: 0 | Status: SHIPPED | OUTPATIENT
Start: 2025-03-10 | End: 2025-03-15

## 2025-03-09 RX ADMIN — SODIUM CHLORIDE 75 ML/HR: 9 INJECTION, SOLUTION INTRAVENOUS at 04:58

## 2025-03-09 RX ADMIN — PANTOPRAZOLE SODIUM 40 MG: 40 TABLET, DELAYED RELEASE ORAL at 06:17

## 2025-03-09 RX ADMIN — INSULIN LISPRO 3 UNITS: 100 INJECTION, SOLUTION INTRAVENOUS; SUBCUTANEOUS at 12:28

## 2025-03-09 RX ADMIN — APIXABAN 2.5 MG: 2.5 TABLET, FILM COATED ORAL at 08:22

## 2025-03-09 RX ADMIN — CLOPIDOGREL BISULFATE 75 MG: 75 TABLET ORAL at 11:14

## 2025-03-09 RX ADMIN — Medication 10 ML: at 08:22

## 2025-03-09 NOTE — PLAN OF CARE
Goal Outcome Evaluation:  Plan of Care Reviewed With: patient        Progress: improving  Outcome Evaluation: VSS, patient stated improvement in condition and strength throughout this shift. Currently infusing IV fluids. Some complaint of discomfort from IV site. IV flushed and assessed. Continue plan of care.

## 2025-03-09 NOTE — PROGRESS NOTES
"Nephrology progress note    Gallito Ruff MD     Current history: Patient states she is feeling better today, she denies any chest pain shortness of breath no nausea or vomiting.    Current medication list:  apixaban, 2.5 mg, Oral, Q12H  atorvastatin, 40 mg, Oral, Nightly  cefTRIAXone, 1,000 mg, Intravenous, Q24H  clopidogrel, 75 mg, Oral, Daily  insulin lispro, 2-7 Units, Subcutaneous, 4x Daily AC & at Bedtime  pantoprazole, 40 mg, Oral, Q AM  sodium chloride, 10 mL, Intravenous, Q12H         senna-docusate sodium **AND** polyethylene glycol **AND** bisacodyl **AND** bisacodyl    dextrose    dextrose    glucagon (human recombinant)    sodium chloride    sodium chloride     Physical Exam:  /70 (BP Location: Right arm, Patient Position: Sitting)   Pulse 69   Temp 97.7 °F (36.5 °C) (Oral)   Resp 18   Ht 157.5 cm (62\")   Wt 57.5 kg (126 lb 12.2 oz) Comment: INFORMED RN, PT. STATES SHE HAS BEEN LOSING WEIGHT.  SpO2 97%   BMI 23.19 kg/m²     Intake/Output Summary (Last 24 hours) at 3/9/2025 1347  Last data filed at 3/9/2025 1310  Gross per 24 hour   Intake 780 ml   Output 1650 ml   Net -870 ml      General: Patient alert and oriented no acute distress  Cardiac: Regular rate and rhythm without a rub no S3 or S4  Lungs: Clear bilaterally no wheezes rhonchi or rales  Abdomen: Bowel sounds positive nontender soft no mass felt no apparent organomegaly noted  Extremities: No edema clubbing or cyanosis  Skin: No acute rashes noted  : Deferred  Neuro: Appears intact with motor and sensory grossly    Results from last 7 days   Lab Units 03/09/25  0814 03/08/25  0606 03/07/25  2357   SODIUM mmol/L 138 133* 137   POTASSIUM mmol/L 4.1 2.9* 3.8   CHLORIDE mmol/L 104 94* 92*   CO2 mmol/L 21.7* 20.8* 27.5   BUN mg/dL 30* 45* 50*   CREATININE mg/dL 1.94* 2.53* 2.99*   CALCIUM mg/dL 8.4* 8.6 9.9   BILIRUBIN mg/dL 0.4 0.5 0.8   ALK PHOS U/L 57 66 76   ALT (SGPT) U/L 11 12 15   AST (SGOT) U/L 25 26 28   GLUCOSE mg/dL " 121* 229* 348*       Estimated Creatinine Clearance: 21 mL/min (A) (by C-G formula based on SCr of 1.94 mg/dL (H)).    Results from last 7 days   Lab Units 03/09/25  0814 03/09/25  0630 03/08/25  0606   MAGNESIUM mg/dL 2.0  --  1.5*   PHOSPHORUS mg/dL  --  2.5 3.3             Results from last 7 days   Lab Units 03/09/25  0630 03/08/25  0606 03/07/25  2357   WBC 10*3/mm3 4.93 6.08 6.21   HEMOGLOBIN g/dL 11.2* 12.0 12.5   PLATELETS 10*3/mm3 176 194 202             Imaging Results (Last 24 Hours)       Procedure Component Value Units Date/Time    US Renal Bilateral [[id number]] Collected: 03/08/25 1808     Updated: 03/08/25 1813    Narrative:      US RENAL BILATERAL    Date of Exam: 3/8/2025 5:05 PM EST    Indication: Acute on chronic kidney disease.    Comparison: Renal ultrasound 1/30/2024    Technique: Grayscale and color Doppler ultrasound evaluation of the kidneys and urinary bladder was performed.      Findings:  Right kidney measures 8.5 x 4 x 4 cm and the left 9.1 x 4.4 x 4.7 cm. Symmetric mild likely senescent related renal cortical thinning. Asymmetric mild left hydronephrosis present on previous comparison and could be chronic. This is slightly more   conspicuous. No solid appearing mass or echogenic shadowing stone. Exophytic simple unilocular Bosniak 1 cyst measuring up to 3.8 cm. Other smaller cysts noted. Layering debris in the urinary bladder. Borderline bladder wall thickening at 5 mm.      Impression:      Impression:  1. Mild hydronephrosis only slightly more conspicuous from previous exam and may represent physiologic nonobstructive dilation.  2. Mild symmetric renal atrophy and renal cortical thinning.  3. Borderline gallbladder wall thickening with layering intraluminal debris. Correlate with urinalysis to exclude cystitis.        Electronically Signed: Yossi Torres MD    3/8/2025 6:11 PM EST    Workstation ID: [id number]             Assessment/plan:    1.  Acute on chronic kidney disease stage  IV with baseline creatinine around 2±.  Creatinine improved more today I wonder if patient had some volume depletion secondary to severe hyperglycemia and osmotic diuresis on top of her Bumex   patient is ultrasound did show some mild hydronephrosis as well as thickened bladder and patient with a dirty urine.  Patient states she was told she could probably go home today and I guess I do not have any objections but with that said I would consider continuing antibiotics for 7 to 10 days since urine culture is not back yet and patient needs to probably see a urologist as an outpatient or if still here tomorrow.  Would keep off her Bumex upon discharge for now.  Patient has an appointment end of this week    2.  Hypokalemia: Improved today, magnesium better as well     3.  Hyperglycemia/diabetes mellitus poor control patient was ordered and given some SGLT2 in the past although she states she is not on it now she cannot afford it.  We discussed with patient she needs better glucose control     4.  History of atherosclerotic coronary artery disease/history of atrial fibs/pacemaker / PTCA     5.  Probable UTI: On Rocephin currently await culture results.  Would send home on questionable cefdinir 7 to 10 days if discharged today    6.  Hydronephrosis: Mild on left questionable chronic, patient needs urology evaluation either here or as an outpatient            Gallito Ruff MD

## 2025-03-09 NOTE — PLAN OF CARE
Goal Outcome Evaluation:  Plan of Care Reviewed With: patient        Progress: improving  Outcome Evaluation: VSS, patient states they are feeling stronger. Patient is now walking from chair to bathroom with standby assist using a cane. Hospitalist and Nephro have seen patient today and are okay to discharge. Pending discharge today.

## 2025-03-09 NOTE — DISCHARGE SUMMARY
[facility]         HOSPITALIST  DISCHARGE SUMMARY    Patient Name: Michelle Atkinson  : 1945  MRN: [id number]    Date of Admission: 3/7/2025  Date of Discharge:  25  Primary Care Physician: Samy Leon Jr., MD    Consults       Date and Time Order Name Status Description    3/8/2025  2:56 AM Inpatient Nephrology Consult      3/8/2025 12:32 AM Inpatient Hospitalist Consult              Active and Resolved Hospital Problems:  Acute kidney injury on chronic kidney stage IV  Volume depletion  Acute cystitis without hematuria  Metabolic acidosis  Hypokalemia  Hypomagnesemia  Type 2 diabetes with hyperglycemia  Hypertension  Hyperlipidemia  Chronic heart failure with reduced ejection fraction  CAD s/p PCI  History of sick sinus syndrome status post pacemaker  History of non-small cell carcinoma of left lung status postchemotherapy and XRT  History of left-sided pleural effusion status post thoracocentesis    Hospital Course     Hospital Course:  Michelle Atkinson is a 79 y.o. female with hypertension, CAD status post PCI, heart failure with reduced ejection fraction, SSS status post pacemaker, lung cancer, CKD stage IV, type 2 diabetes who was sent to the emergency room after outpatient labs showed elevated creatinine above baseline.  Reported generalized weakness, urinary frequency.  On presentation vital stable. Labs showed BUN 50, creatinine 2.99 (creatinine 1 month ago was 1.35), low potassium, magnesium, mild metabolic acidosis, hyperglycemia.  Urinalysis concerning for UTI.  Started on IV fluids and Rocephin.  Diuretics held.  Creatinine improved to 1.94.  Electrolytes corrected.  Blood pressure and heart rate stable. Urine culture grew 100,000 CFU E. Coli, transition to cefdinir.  Of note Eliquis decreased to 2.5 mg every 12 hours given age and creatinine.  Patient was feeling better the following day, tolerating oral intake, ambulating dependently and wanted to go home.   Nephrology recommended holding Bumex, outpatient labs.  She does have follow-up with cardiology on 3/10 already scheduled.  Medication changes discussed, discharged home in stable condition    DISCHARGE Follow Up Recommendations for labs and diagnostics:   - BMP 1 week    Day of Discharge     Vital Signs:  Temp:  [97.7 °F (36.5 °C)-98.1 °F (36.7 °C)] 97.7 °F (36.5 °C)  Heart Rate:  [64-76] 69  Resp:  [18] 18  BP: (122-134)/(68-74) 125/70  Physical Exam:   GENERAL: conversant and nontoxic.  HEART: No edema  LUNGS: nonlabored  ABDOMEN: Soft, nondistended  NEUROLOGIC: Alert, CN intact        Discharge Details        Discharge Medications        New Medications        Instructions Start Date   cefdinir 300 MG capsule  Commonly known as: OMNICEF   300 mg, Oral, Daily   Start Date: March 10, 2025            Changes to Medications        Instructions Start Date   apixaban 5 MG tablet tablet  Commonly known as: ELIQUIS  What changed: how much to take   2.5 mg, Oral, Every 12 Hours Scheduled      glipizide 10 MG tablet  Commonly known as: GLUCOTROL  What changed: how much to take   10 mg, Oral, 2 Times Daily Before Meals             Continue These Medications        Instructions Start Date   alendronate 70 MG tablet  Commonly known as: FOSAMAX   Take 1 tablet by mouth Every 7 (Seven) Days. SUN      amiodarone 200 MG tablet  Commonly known as: PACERONE   300 mg, Daily      atorvastatin 40 MG tablet  Commonly known as: LIPITOR   Take 1 tablet by mouth Every Night.      bisoprolol 5 MG tablet  Commonly known as: ZEBeta   5 mg, Oral, Daily      clopidogrel 75 MG tablet  Commonly known as: PLAVIX   1 tablet, Daily      midodrine 5 MG tablet  Commonly known as: PROAMATINE   5 mg, 3 Times Daily Before Meals      pantoprazole 20 MG EC tablet  Commonly known as: PROTONIX   20 mg, Oral, Every Early Morning, (Replaces omeprazole)      pioglitazone 30 MG tablet  Commonly known as: ACTOS   Take 1 tablet by mouth Daily.      potassium  chloride 10 MEQ CR tablet   10 mEq, Oral, Daily      solifenacin 5 MG tablet  Commonly known as: VESICARE   5 mg, Daily             Stop These Medications      bumetanide 2 MG tablet  Commonly known as: BUMEX              Allergies   Allergen Reactions    Codeine Nausea And Vomiting       Discharge Disposition:  Home or Self Care    Diet:  Hospital:  Diet Order   Procedures    Diet: Cardiac, Diabetic; Healthy Heart (2-3 Na+); Consistent Carbohydrate; Fluid Consistency: Thin (IDDSI 0)       Discharge Activity:   Activity Instructions       Activity as Tolerated              CODE STATUS:  Code Status and Medical Interventions: CPR (Attempt to Resuscitate); Full Support   Ordered at: 03/08/25 0256     Code Status (Patient has no pulse and is not breathing):    CPR (Attempt to Resuscitate)     Medical Interventions (Patient has pulse or is breathing):    Full Support     Level Of Support Discussed With:    Patient         Future Appointments   Date Time Provider Department Center   3/10/2025 10:00 AM [facility]   3/10/2025 11:15 AM Kath Smith MD [facility]   5/7/2025 10:15 AM [facility] SPT [facility] DEVICE CHECK [facility]   12/11/2025 10:00 AM [facility]   12/11/2025 11:00 AM Kath Smith MD [facility]       Additional Instructions for the Follow-ups that You Need to Schedule       Discharge Follow-up with PCP   As directed       Currently Documented PCP:    Samy Leon Jr., MD    PCP Phone Number:    [phone]     Follow Up Details: 1-2 weeks        Discharge Follow-up with Specified Provider: Jocy Donato / Nephrology   As directed      To: Jocy Donato / Nephrology   Follow Up Details: on 3/13/2025                Pertinent  and/or Most Recent Results     PROCEDURES:   NONE    LAB RESULTS:      Lab 03/09/25  0630 03/08/25  0606 03/07/25  2357   WBC 4.93 6.08 6.21   HEMOGLOBIN 11.2* 12.0 12.5   HEMATOCRIT 34.6 38.5 38.7    PLATELETS 176 194 202   NEUTROS ABS  --  3.50 3.62   IMMATURE GRANS (ABS)  --  0.03 0.03   LYMPHS ABS  --  1.82 1.91   MONOS ABS  --  0.56 0.53   EOS ABS  --  0.11 0.08   MCV 91.5 93.4 90.0         Lab 03/09/25  0814 03/09/25  0630 03/08/25  0606 03/07/25  2357   SODIUM 138  --  133* 137   POTASSIUM 4.1  --  2.9* 3.8   CHLORIDE 104  --  94* 92*   CO2 21.7*  --  20.8* 27.5   ANION GAP 12.3  --  18.2* 17.5*   BUN 30*  --  45* 50*   CREATININE 1.94*  --  2.53* 2.99*   EGFR 25.8*  --  18.7* 15.3*   GLUCOSE 121*  --  229* 348*   CALCIUM 8.4*  --  8.6 9.9   MAGNESIUM 2.0  --  1.5*  --    PHOSPHORUS  --  2.5 3.3  --          Lab 03/09/25  0814 03/08/25  0606 03/07/25  2357   TOTAL PROTEIN 5.8* 6.6 7.3   ALBUMIN 2.8* 3.0* 3.7   GLOBULIN 3.0 3.6 3.6   ALT (SGPT) 11 12 15   AST (SGOT) 25 26 28   BILIRUBIN 0.4 0.5 0.8   ALK PHOS 57 66 76                     Brief Urine Lab Results  (Last result in the past 365 days)        Color   Clarity   Blood   Leuk Est   Nitrite   Protein   CREAT   Urine HCG        03/08/25 0047 Yellow   Cloudy   Trace   Moderate (2+)   Negative   Negative                 Microbiology Results (last 10 days)       Procedure Component Value - Date/Time    Urine Culture - Urine, Urine, Clean Catch [[id number]]  (Abnormal) Collected: 03/08/25 0047    Lab Status: Preliminary result Specimen: Urine, Clean Catch Updated: 03/09/25 1416     Urine Culture >100,000 CFU/mL Escherichia coli    Narrative:      Colonization of the urinary tract without infection is common. Treatment is discouraged unless the patient is symptomatic, pregnant, or undergoing an invasive urologic procedure.            US Renal Bilateral  Result Date: 3/8/2025  Impression: 1. Mild hydronephrosis only slightly more conspicuous from previous exam and may represent physiologic nonobstructive dilation. 2. Mild symmetric renal atrophy and renal cortical thinning. 3. Borderline gallbladder wall thickening with layering intraluminal debris.  Correlate with urinalysis to exclude cystitis. Electronically Signed: Yossi Torres MD  3/8/2025 6:11 PM EST  Workstation ID: [id number]    XR Chest 1 View  Result Date: 3/8/2025  There may be a small left pleural effusion. Mild atelectasis and/or infiltrate(s) in the left lung base is (are) possible. Overall, there is thought to be improvement in aeration of the left lung base since 1/17/2025. Please see above comments for further detail.    Portions of this note were completed with a voice recognition program.  3/8/2025 3:56 AM by Samy Vieyra MD on Workstation: [id number]                 Results for orders placed during the hospital encounter of 01/17/25    Adult Transthoracic Echo Complete W/ Cont if Necessary Per Protocol    Interpretation Summary    Left ventricular ejection fraction appears approximately 50%.  Inferoapical hypokinesis noted.    Left ventricular diastolic function is consistent with (grade I) impaired relaxation and age.    Estimated right ventricular systolic pressure from tricuspid regurgitation is mildly elevated (35-45 mmHg).    Mild to early moderate mitral and tricuspid regurgitation.    Pleural effusion noted.      Labs Pending at Discharge:  Pending Labs       Order Current Status    Urine Culture - Urine, Urine, Clean Catch Preliminary result              Time spent on Discharge including face to face service:  >30 minutes    Electronically signed by Eduardo Cooper DO, 03/09/25, 2:38 PM EDT.

## 2025-03-09 NOTE — PLAN OF CARE
Goal Outcome Evaluation:  Plan of Care Reviewed With: patient        Progress: improving  Outcome Evaluation: patient education reviewed and patient understood and acknowledged by teachback. Discharging home.

## 2025-03-10 ENCOUNTER — OFFICE VISIT (OUTPATIENT)
Dept: CARDIOLOGY | Facility: CLINIC | Age: 80
End: 2025-03-10
Payer: MEDICARE

## 2025-03-10 ENCOUNTER — HOSPITAL ENCOUNTER (OUTPATIENT)
Dept: CARDIOLOGY | Facility: HOSPITAL | Age: 80
Discharge: HOME | End: 2025-03-10
Admitting: INTERNAL MEDICINE
Payer: MEDICARE

## 2025-03-10 VITALS
WEIGHT: 126 LBS | HEIGHT: 62 IN | BODY MASS INDEX: 23.19 KG/M2 | SYSTOLIC BLOOD PRESSURE: 130 MMHG | DIASTOLIC BLOOD PRESSURE: 81 MMHG

## 2025-03-10 VITALS
WEIGHT: 130 LBS | BODY MASS INDEX: 23.92 KG/M2 | SYSTOLIC BLOOD PRESSURE: 130 MMHG | DIASTOLIC BLOOD PRESSURE: 73 MMHG | HEIGHT: 62 IN | HEART RATE: 74 BPM

## 2025-03-10 DIAGNOSIS — Z95.0 PRESENCE OF CARDIAC PACEMAKER: ICD-10-CM

## 2025-03-10 DIAGNOSIS — I25.118 CORONARY ARTERY DISEASE OF NATIVE ARTERY OF NATIVE HEART WITH STABLE ANGINA PECTORIS: ICD-10-CM

## 2025-03-10 DIAGNOSIS — I95.9 HYPOTENSION, UNSPECIFIED HYPOTENSION TYPE: ICD-10-CM

## 2025-03-10 DIAGNOSIS — I48.0 PAROXYSMAL ATRIAL FIBRILLATION: ICD-10-CM

## 2025-03-10 DIAGNOSIS — R94.31 ABNORMAL EKG: ICD-10-CM

## 2025-03-10 DIAGNOSIS — Z79.899 ON AMIODARONE THERAPY: Primary | ICD-10-CM

## 2025-03-10 LAB
AORTIC DIMENSIONLESS INDEX: 0.7 (DI)
ASCENDING AORTA: 3 CM
AV MEAN PRESS GRAD SYS DOP V1V2: 1.86 MMHG
AV VMAX SYS DOP: 98.6 CM/SEC
BACTERIA SPEC AEROBE CULT: ABNORMAL
BH CV ECHO MEAS - ACS: 2.12 CM
BH CV ECHO MEAS - AO MAX PG: 3.9 MMHG
BH CV ECHO MEAS - AO ROOT DIAM: 3.6 CM
BH CV ECHO MEAS - AO V2 VTI: 19.2 CM
BH CV ECHO MEAS - AVA(I,D): 2.19 CM2
BH CV ECHO MEAS - EDV(CUBED): 137.1 ML
BH CV ECHO MEAS - EDV(MOD-SP2): 79 ML
BH CV ECHO MEAS - EDV(MOD-SP4): 86 ML
BH CV ECHO MEAS - EF(MOD-SP2): 50.6 %
BH CV ECHO MEAS - EF(MOD-SP4): 51.2 %
BH CV ECHO MEAS - ESV(CUBED): 56.7 ML
BH CV ECHO MEAS - ESV(MOD-SP2): 39 ML
BH CV ECHO MEAS - ESV(MOD-SP4): 42 ML
BH CV ECHO MEAS - FS: 25.5 %
BH CV ECHO MEAS - IVS/LVPW: 1.1 CM
BH CV ECHO MEAS - IVSD: 0.9 CM
BH CV ECHO MEAS - LAT PEAK E' VEL: 7 CM/SEC
BH CV ECHO MEAS - LV DIASTOLIC VOL/BSA (35-75): 54.8 CM2
BH CV ECHO MEAS - LV MASS(C)D: 156.8 GRAMS
BH CV ECHO MEAS - LV MAX PG: 2.17 MMHG
BH CV ECHO MEAS - LV MEAN PG: 0.99 MMHG
BH CV ECHO MEAS - LV SYSTOLIC VOL/BSA (12-30): 26.7 CM2
BH CV ECHO MEAS - LV V1 MAX: 73.7 CM/SEC
BH CV ECHO MEAS - LV V1 VTI: 13 CM
BH CV ECHO MEAS - LVIDD: 5.2 CM
BH CV ECHO MEAS - LVIDS: 3.8 CM
BH CV ECHO MEAS - LVOT AREA: 3.2 CM2
BH CV ECHO MEAS - LVOT DIAM: 2.03 CM
BH CV ECHO MEAS - LVPWD: 0.82 CM
BH CV ECHO MEAS - MED PEAK E' VEL: 3.7 CM/SEC
BH CV ECHO MEAS - MR MAX PG: 45.7 MMHG
BH CV ECHO MEAS - MR MAX VEL: 338 CM/SEC
BH CV ECHO MEAS - MV A DUR: 0.12 SEC
BH CV ECHO MEAS - MV A MAX VEL: 81 CM/SEC
BH CV ECHO MEAS - MV DEC SLOPE: 210.3 CM/SEC2
BH CV ECHO MEAS - MV DEC TIME: 0.2 SEC
BH CV ECHO MEAS - MV E MAX VEL: 66 CM/SEC
BH CV ECHO MEAS - MV E/A: 0.81
BH CV ECHO MEAS - MV MAX PG: 1.81 MMHG
BH CV ECHO MEAS - MV MEAN PG: 1.17 MMHG
BH CV ECHO MEAS - MV P1/2T: 94.5 MSEC
BH CV ECHO MEAS - MV V2 VTI: 17.9 CM
BH CV ECHO MEAS - MVA(P1/2T): 2.33 CM2
BH CV ECHO MEAS - MVA(VTI): 2.35 CM2
BH CV ECHO MEAS - PA ACC TIME: 0.09 SEC
BH CV ECHO MEAS - PA V2 MAX: 70.1 CM/SEC
BH CV ECHO MEAS - PULM A REVS DUR: 0.13 SEC
BH CV ECHO MEAS - PULM A REVS VEL: 24.7 CM/SEC
BH CV ECHO MEAS - PULM DIAS VEL: 31.5 CM/SEC
BH CV ECHO MEAS - PULM S/D: 1.71
BH CV ECHO MEAS - PULM SYS VEL: 53.8 CM/SEC
BH CV ECHO MEAS - RAP SYSTOLE: 3 MMHG
BH CV ECHO MEAS - RV MAX PG: 2.2 MMHG
BH CV ECHO MEAS - RV V1 MAX: 74.2 CM/SEC
BH CV ECHO MEAS - RV V1 VTI: 15.1 CM
BH CV ECHO MEAS - RVSP: 24 MMHG
BH CV ECHO MEAS - SV(LVOT): 42 ML
BH CV ECHO MEAS - SV(MOD-SP2): 40 ML
BH CV ECHO MEAS - SV(MOD-SP4): 44 ML
BH CV ECHO MEAS - SVI(LVOT): 26.8 ML/M2
BH CV ECHO MEAS - SVI(MOD-SP2): 25.5 ML/M2
BH CV ECHO MEAS - SVI(MOD-SP4): 28 ML/M2
BH CV ECHO MEAS - TAPSE (>1.6): 1.46 CM
BH CV ECHO MEAS - TR MAX PG: 20.6 MMHG
BH CV ECHO MEAS - TR MAX VEL: 226.7 CM/SEC
BH CV ECHO MEASUREMENTS AVERAGE E/E' RATIO: 12.34
BH CV XLRA - TDI S': 9.1 CM/SEC
LEFT ATRIUM VOLUME INDEX: 22.1 ML/M2
LV EF BIPLANE MOD: 50.7 %
SINUS: 2.8 CM
STJ: 2.7 CM

## 2025-03-10 PROCEDURE — 93306 TTE W/DOPPLER COMPLETE: CPT

## 2025-03-10 PROCEDURE — 93306 TTE W/DOPPLER COMPLETE: CPT | Performed by: INTERNAL MEDICINE

## 2025-03-10 NOTE — OUTREACH NOTE
Prep Survey      Flowsheet Row Responses   [facility] patient discharged from? [facility]   Is LACE score < 7 ? No   Eligibility Readm Mgmt   Discharge diagnosis HAYLEE (acute kidney injury)   Does the patient have one of the following disease processes/diagnoses(primary or secondary)? Other   Does the patient have Home health ordered? No   Is there a DME ordered? No   Prep survey completed? Yes            Aurea KUMAR - Registered Nurse

## 2025-03-10 NOTE — CASE MANAGEMENT/SOCIAL WORK
"IP:  3/8/25 (Met IP criteria)  DC:  3/9/25  LOS:  < 2MN - [id number]  Exception:  N/A - per MD DC Summary, \"Patient was feeling better the following day, tolerating oral intake, ambulating dependently and wanted to go home.\"        (First Provider Eval of Patient: 3/7/25 @ 5988)  "

## 2025-03-10 NOTE — PROGRESS NOTES
1 MO FOLLOW UP WITH ECHO AND EKG   Subjective:        Michelle Atkinson is a 80 y.o. female who here for follow up    CC  Coronary artery disease atrial fibrillation  HPI  80-year-old with coronary artery disease atrial fibrillation low blood pressure here for the follow-up denies any chest pains or tightness in the chest     Problems Addressed this Visit          Cardiac and Vasculature    Coronary artery disease of native artery of native heart with stable angina pectoris    Paroxysmal atrial fibrillation    Hypotension     Other Visit Diagnoses         On amiodarone therapy    -  Primary    Relevant Orders    XR Chest 2 View    Comprehensive Metabolic Panel    TSH      Presence of cardiac pacemaker              Diagnoses         Codes Comments      On amiodarone therapy    -  Primary ICD-10-CM: Z79.899  ICD-9-CM: V58.69       Coronary artery disease of native artery of native heart with stable angina pectoris     ICD-10-CM: I25.118  ICD-9-CM: 414.01, 413.9       Paroxysmal atrial fibrillation     ICD-10-CM: I48.0  ICD-9-CM: 427.31       Hypotension, unspecified hypotension type     ICD-10-CM: I95.9  ICD-9-CM: 458.9       Presence of cardiac pacemaker     ICD-10-CM: Z95.0  ICD-9-CM: V45.01           .    The following portions of the patient's history were reviewed and updated as appropriate: allergies, current medications, past family history, past medical history, past social history, past surgical history and problem list.    Past Medical History:   Diagnosis Date    CAD (coronary artery disease)     Essential (primary) hypertension     Ex-cigarette smoker     Quit 1992    Lung cancer 2016    S/P Radiation and chemotherapy    Paroxysmal atrial fibrillation     Sick sinus syndrome 04/2024    Stroke 11/04/2023    Type 2 diabetes mellitus      reports that she has quit smoking. Her smoking use included cigarettes. She has never been exposed to tobacco smoke. She has never used smokeless tobacco. She reports that  "she does not currently use alcohol. She reports that she does not use drugs.   Family History   Problem Relation Age of Onset    Heart disease Mother     Heart disease Father     Heart disease Brother        Review of Systems  Constitutional: No wt loss, fever, fatigue  Gastrointestinal: No nausea, abdominal pain  Behavioral/Psych: No insomnia or anxiety   Cardiovascular no chest pains or tightness in the chest  Objective:       Physical Exam  /73   Pulse 74   Ht 157.5 cm (62\")   Wt 59 kg (130 lb)   BMI 23.78 kg/m²   General appearance: No acute changes   Neck: Trachea midline; NECK, supple, no thyromegaly or lymphadenopathy   Lungs: Normal size and shape, normal breath sounds, equal distribution of air, no rales and rhonchi   CV: S1-S2 regular, no murmurs, no rub, no gallop   Abdomen: Soft, nontender; no masses , no abnormal abdominal sounds   Extremities: No deformity , normal color , no peripheral edema   Skin: Normal temperature, turgor and texture; no rash, ulcers            ECG 12 Lead    Date/Time: 3/10/2025 12:10 PM  Performed by: Kath Smith MD    Authorized by: Kath Smith MD  Comparison: compared with previous ECG   Similar to previous ECG  Rhythm: sinus rhythm    Clinical impression: non-specific ECG            Echocardiogram:    Results for orders placed during the hospital encounter of 01/17/25    Adult Transthoracic Echo Complete W/ Cont if Necessary Per Protocol    Interpretation Summary    Left ventricular ejection fraction appears approximately 50%.  Inferoapical hypokinesis noted.    Left ventricular diastolic function is consistent with (grade I) impaired relaxation and age.    Estimated right ventricular systolic pressure from tricuspid regurgitation is mildly elevated (35-45 mmHg).    Mild to early moderate mitral and tricuspid regurgitation.    Pleural effusion noted.          Current Outpatient Medications:     alendronate (FOSAMAX) 70 MG tablet, Take 1 tablet " by mouth Every 7 (Seven) Days. SUN, Disp: , Rfl:     amiodarone (PACERONE) 200 MG tablet, Take 0.5 tablets by mouth Daily., Disp: , Rfl:     apixaban (ELIQUIS) 5 MG tablet tablet, Take 0.5 tablets by mouth Every 12 (Twelve) Hours. Indications: Atrial Fibrillation, Disp: , Rfl:     atorvastatin (LIPITOR) 40 MG tablet, Take 1 tablet by mouth Every Night., Disp: , Rfl:     bisoprolol (ZEBeta) 5 MG tablet, Take 1 tablet by mouth Daily., Disp: 90 tablet, Rfl: 3    clopidogrel (PLAVIX) 75 MG tablet, Take 1 tablet by mouth Daily., Disp: , Rfl:     glipizide (GLUCOTROL) 10 MG tablet, Take 1 tablet by mouth 2 (Two) Times a Day Before Meals., Disp: , Rfl:     midodrine (PROAMATINE) 5 MG tablet, Take 1 tablet by mouth 3 (Three) Times a Day Before Meals., Disp: , Rfl:     pantoprazole (PROTONIX) 20 MG EC tablet, Take 1 tablet by mouth Every Morning. (Replaces omeprazole), Disp: 30 tablet, Rfl: 0    pioglitazone (ACTOS) 30 MG tablet, Take 1 tablet by mouth Daily., Disp: , Rfl:     potassium chloride 10 MEQ CR tablet, Take 1 tablet by mouth Daily., Disp: 90 tablet, Rfl: 2    solifenacin (VESICARE) 5 MG tablet, Take 1 tablet by mouth Daily., Disp: , Rfl:    Assessment:                Plan:          ICD-10-CM ICD-9-CM   1. On amiodarone therapy  Z79.899 V58.69   2. Coronary artery disease of native artery of native heart with stable angina pectoris  I25.118 414.01     413.9   3. Paroxysmal atrial fibrillation  I48.0 427.31   4. Hypotension, unspecified hypotension type  I95.9 458.9   5. Presence of cardiac pacemaker  Z95.0 V45.01     1. On amiodarone therapy  Michelle Atkinson IS ON AMIODARONE,   Significant side effects associated with this medication has been explained     Pros and cons of the medications has been discussed, decision has been to continue the medication   6 months to yearly checkup for eye examination, thyroid function test, chest x-ray and liver function test has been advised    Patient understands well    - XR  Chest 2 View; Future  - Comprehensive Metabolic Panel; Future  - TSH; Future    2. Coronary artery disease of native artery of native heart with stable angina pectoris  No angina pectoris    3. Paroxysmal atrial fibrillation  Under control    4. Hypotension, unspecified hypotension type  No change    5. Presence of cardiac pacemaker  Functioning normally    NO CV NEEDED    REDUCE ELLOQUISE 2.5 MG BID    REDUCE AMIODARONE 100 MG PO DAILY    Pros and cons of this new medication / change medication has been explained to  the patient    Possible side effects has been explained    Associated need of the blood  Work has been explained    Need for the compliance of the medication has been explained    SEE IN 6 MONTHS WITH AMIO CHECK  COUNSELING:    Michelle Coffey was given to patient for the following topics: diagnostic results, risk factor reductions, impressions, risks and benefits of treatment options and importance of treatment compliance .       SMOKING COUNSELING:        Dictated using Dragon dictation

## 2025-03-13 ENCOUNTER — READMISSION MANAGEMENT (OUTPATIENT)
Dept: CALL CENTER | Facility: HOSPITAL | Age: 80
End: 2025-03-13
Payer: MEDICARE

## 2025-03-13 NOTE — OUTREACH NOTE
Medical Week 1 Survey      Flowsheet Row Responses   [facility] patient discharged from? [facility]   Does the patient have one of the following disease processes/diagnoses(primary or secondary)? Other   Week 1 attempt successful? Yes   Call start time 1523   Call end time 1524   Discharge diagnosis HAYLEE (acute kidney injury)   Person spoke with today (if not patient) and relationship Patient   Meds reviewed with patient/caregiver? Yes   Does the patient have all medications ordered at discharge? Yes   Prescription comments No concerns or questions noted.   Is the patient taking all medications as directed (includes completed medication regime)? Yes   Does the patient have a primary care provider?  Yes   Comments regarding PCP Seen cards, Kidney, Pulm MD this week.   Has home health visited the patient within 72 hours of discharge? N/A   Psychosocial issues? No   Did the patient receive a copy of their discharge instructions? Yes   Nursing interventions Reviewed instructions with patient   What is the patient's perception of their health status since discharge? Improving   Is the patient/caregiver able to teach back signs and symptoms related to disease process for when to call PCP? Yes   Is the patient/caregiver able to teach back signs and symptoms related to disease process for when to call 911? Yes   Is the patient/caregiver able to teach back the hierarchy of who to call/visit for symptoms/problems? PCP, Specialist, Home health nurse, Urgent Care, ED, 911 Yes   Week 1 call completed? Yes   Graduated Yes   Wrap up additional comments Patient reports doing well. No concerns or questions noted.   Call end time 1524            Jasmin ALLEN - Registered Nurse

## 2025-03-18 LAB
QT INTERVAL: 452 MS
QT INTERVAL: 527 MS
QTC INTERVAL: 493 MS
QTC INTERVAL: 558 MS

## 2025-05-05 ENCOUNTER — TELEPHONE (OUTPATIENT)
Dept: CARDIOLOGY | Facility: CLINIC | Age: 80
End: 2025-05-05
Payer: MEDICARE

## 2025-05-05 NOTE — TELEPHONE ENCOUNTER
----- Message from Judith WALKER sent at 4/28/2025  3:13 PM EDT -----  Regarding: RE HEART RATE  Contact: [phone]  SHE NEEDS TO KNOW WHAT MEDICATION SHE WAS TOLD TO STOP MEDICATION?HER HR IS NOT DOING GOOD      RESPONDED TO PATIENT QUESTIONS -SHE IS DOING FINE   NO ISSUES

## 2025-05-07 ENCOUNTER — CLINICAL SUPPORT NO REQUIREMENTS (OUTPATIENT)
Dept: CARDIOLOGY | Facility: CLINIC | Age: 80
End: 2025-05-07
Payer: MEDICARE

## 2025-05-07 DIAGNOSIS — Z45.018 PACEMAKER REPROGRAMMING/CHECK: Primary | ICD-10-CM

## 2025-05-07 PROCEDURE — 93280 PM DEVICE PROGR EVAL DUAL: CPT | Performed by: INTERNAL MEDICINE

## 2025-07-11 ENCOUNTER — TELEPHONE (OUTPATIENT)
Dept: CARDIOLOGY | Facility: CLINIC | Age: 80
End: 2025-07-11

## 2025-08-28 LAB
MDC_IDC_MSMT_BATTERY_REMAINING_PERCENTAGE: 85 %
MDC_IDC_MSMT_BATTERY_STATUS: NORMAL
MDC_IDC_MSMT_LEADCHNL_RA_DTM: NORMAL
MDC_IDC_MSMT_LEADCHNL_RA_IMPEDANCE_VALUE: 449
MDC_IDC_MSMT_LEADCHNL_RA_PACING_THRESHOLD_AMPLITUDE: 0.7
MDC_IDC_MSMT_LEADCHNL_RA_PACING_THRESHOLD_POLARITY: NORMAL
MDC_IDC_MSMT_LEADCHNL_RA_PACING_THRESHOLD_PULSEWIDTH: 0.4
MDC_IDC_MSMT_LEADCHNL_RA_SENSING_INTR_AMPL: 1.6
MDC_IDC_MSMT_LEADCHNL_RV_DTM: NORMAL
MDC_IDC_MSMT_LEADCHNL_RV_IMPEDANCE_VALUE: 546
MDC_IDC_MSMT_LEADCHNL_RV_PACING_THRESHOLD_AMPLITUDE: 1.1
MDC_IDC_MSMT_LEADCHNL_RV_PACING_THRESHOLD_POLARITY: NORMAL
MDC_IDC_MSMT_LEADCHNL_RV_PACING_THRESHOLD_PULSEWIDTH: 0.4
MDC_IDC_MSMT_LEADCHNL_RV_SENSING_INTR_AMPL: 11.5
MDC_IDC_PG_IMPLANT_DTM: NORMAL
MDC_IDC_PG_MFG: NORMAL
MDC_IDC_PG_MODEL: NORMAL
MDC_IDC_PG_SERIAL: NORMAL
MDC_IDC_PG_TYPE: NORMAL
MDC_IDC_SESS_DTM: NORMAL
MDC_IDC_SESS_TYPE: NORMAL
MDC_IDC_SET_BRADY_AT_MODE_SWITCH_RATE: 160
MDC_IDC_SET_BRADY_LOWRATE: 60
MDC_IDC_SET_BRADY_MAX_SENSOR_RATE: 120
MDC_IDC_SET_BRADY_MAX_TRACKING_RATE: 130
MDC_IDC_SET_BRADY_MODE: NORMAL
MDC_IDC_SET_BRADY_PAV_DELAY: 300
MDC_IDC_SET_BRADY_SAV_DELAY: 270
MDC_IDC_SET_LEADCHNL_RA_PACING_AMPLITUDE: 1.7
MDC_IDC_SET_LEADCHNL_RA_PACING_POLARITY: NORMAL
MDC_IDC_SET_LEADCHNL_RA_PACING_PULSEWIDTH: 0.4
MDC_IDC_SET_LEADCHNL_RA_SENSING_POLARITY: NORMAL
MDC_IDC_SET_LEADCHNL_RV_PACING_AMPLITUDE: 1.6
MDC_IDC_SET_LEADCHNL_RV_PACING_POLARITY: NORMAL
MDC_IDC_SET_LEADCHNL_RV_PACING_PULSEWIDTH: 0.4
MDC_IDC_SET_LEADCHNL_RV_SENSING_POLARITY: NORMAL
MDC_IDC_STAT_AT_BURDEN_PERCENT: 0
MDC_IDC_STAT_BRADY_RA_PERCENT_PACED: 77
MDC_IDC_STAT_BRADY_RV_PERCENT_PACED: 1

## (undated) DEVICE — CATH DIAG IMPULSE FL3.5 5F 100CM

## (undated) DEVICE — LOU LOOP RECORDER PACK: Brand: MEDLINE INDUSTRIES, INC.

## (undated) DEVICE — PINNACLE INTRODUCER SHEATH: Brand: PINNACLE

## (undated) DEVICE — VBT GC 6F .070 XB LAD 3.5 100CM: Brand: VISTA BRITE TIP

## (undated) DEVICE — CATH F6 ST JL 4 100CM: Brand: SUPERTORQUE

## (undated) DEVICE — 6F .070 JR4 ECO PK: Brand: VISTA BRITE TIP

## (undated) DEVICE — INTRO GLIDESHEATH SLENDER SS 21G .021 6F 10CM 45CM

## (undated) DEVICE — KT MANIFLD CARDIAC

## (undated) DEVICE — TBG PENCL TELESCP MEGADYNE SMOKE EVAC 10FT

## (undated) DEVICE — GW HITORQUE/BAL MID/WT J W/HCOAT .014 3X190CM

## (undated) DEVICE — INTRO SHEATH PRELUDE SNAP .038 6F 13CM W/SDPRT

## (undated) DEVICE — CATH F6 ST JR 4 100CM: Brand: SUPERTORQUE

## (undated) DEVICE — GW INQWIRE FC PTFE STD J/1.5 .035 260

## (undated) DEVICE — 8 FOOT DISPOSABLE EXTENSION CABLE WITH SAFE CONNECT / ALLIGATOR CLIP

## (undated) DEVICE — BALN PRESS WEDGE 5F 110CM

## (undated) DEVICE — HI-TORQUE BALANCE MIDDLEWEIGHT UNIVERSAL GUIDE WIRE .014 STRAIGHT TIP 3.0 CM X 190 CM: Brand: HI-TORQUE BALANCE MIDDLEWEIGHT UNIVERSAL

## (undated) DEVICE — CATH DIAG IMPULSE FR4 5F 100CM

## (undated) DEVICE — PK CATH CARD 40

## (undated) DEVICE — NC TREK NEO™ CORONARY DILATATION CATHETER 2.00 MM X 12 MM / RAPID-EXCHANGE: Brand: NC TREK NEO™

## (undated) DEVICE — INTRO GLIDESHEATH .021 5F100MM

## (undated) DEVICE — ANGIO-SEAL VIP VASCULAR CLOSURE DEVICE: Brand: ANGIO-SEAL

## (undated) DEVICE — GLIDESHEATH SLENDER STAINLESS STEEL KIT: Brand: GLIDESHEATH SLENDER

## (undated) DEVICE — GW EMR FIX EXCHG J STD .035 3MM 260CM